# Patient Record
Sex: FEMALE | Race: WHITE | Employment: OTHER | URBAN - METROPOLITAN AREA
[De-identification: names, ages, dates, MRNs, and addresses within clinical notes are randomized per-mention and may not be internally consistent; named-entity substitution may affect disease eponyms.]

---

## 2017-02-07 ENCOUNTER — OFFICE VISIT (OUTPATIENT)
Dept: INTERNAL MEDICINE CLINIC | Age: 82
End: 2017-02-07

## 2017-02-07 VITALS
WEIGHT: 118 LBS | BODY MASS INDEX: 20.91 KG/M2 | HEIGHT: 63 IN | HEART RATE: 84 BPM | DIASTOLIC BLOOD PRESSURE: 60 MMHG | SYSTOLIC BLOOD PRESSURE: 132 MMHG

## 2017-02-07 DIAGNOSIS — E03.4 HYPOTHYROIDISM DUE TO ACQUIRED ATROPHY OF THYROID: Primary | ICD-10-CM

## 2017-02-07 DIAGNOSIS — Z23 NEED FOR 23-POLYVALENT PNEUMOCOCCAL POLYSACCHARIDE VACCINE: ICD-10-CM

## 2017-02-07 DIAGNOSIS — F33.2 ENDOGENOUS DEPRESSION (HCC): ICD-10-CM

## 2017-02-07 PROCEDURE — 90732 PPSV23 VACC 2 YRS+ SUBQ/IM: CPT | Performed by: INTERNAL MEDICINE

## 2017-02-07 PROCEDURE — 99214 OFFICE O/P EST MOD 30 MIN: CPT | Performed by: INTERNAL MEDICINE

## 2017-02-07 PROCEDURE — G0009 ADMIN PNEUMOCOCCAL VACCINE: HCPCS | Performed by: INTERNAL MEDICINE

## 2017-02-07 RX ORDER — CITALOPRAM 10 MG/1
10 TABLET ORAL DAILY
Qty: 30 TABLET | Refills: 11 | Status: SHIPPED | OUTPATIENT
Start: 2017-02-07 | End: 2018-02-17 | Stop reason: SDUPTHER

## 2017-02-07 RX ORDER — LEVOTHYROXINE SODIUM 0.07 MG/1
75 TABLET ORAL DAILY
Qty: 90 TABLET | Refills: 3 | Status: SHIPPED | OUTPATIENT
Start: 2017-02-07 | End: 2018-03-17 | Stop reason: SDUPTHER

## 2017-02-07 ASSESSMENT — PATIENT HEALTH QUESTIONNAIRE - PHQ9
SUM OF ALL RESPONSES TO PHQ9 QUESTIONS 1 & 2: 1
2. FEELING DOWN, DEPRESSED OR HOPELESS: 0
1. LITTLE INTEREST OR PLEASURE IN DOING THINGS: 1
SUM OF ALL RESPONSES TO PHQ QUESTIONS 1-9: 1

## 2017-04-28 ENCOUNTER — OFFICE VISIT (OUTPATIENT)
Dept: INTERNAL MEDICINE CLINIC | Age: 82
End: 2017-04-28

## 2017-04-28 VITALS
HEART RATE: 64 BPM | DIASTOLIC BLOOD PRESSURE: 78 MMHG | BODY MASS INDEX: 21.26 KG/M2 | SYSTOLIC BLOOD PRESSURE: 156 MMHG | HEIGHT: 63 IN | TEMPERATURE: 98.3 F | WEIGHT: 120 LBS

## 2017-04-28 DIAGNOSIS — J20.0 ACUTE BRONCHITIS DUE TO MYCOPLASMA PNEUMONIAE: Primary | ICD-10-CM

## 2017-04-28 PROCEDURE — 99213 OFFICE O/P EST LOW 20 MIN: CPT | Performed by: INTERNAL MEDICINE

## 2017-04-28 RX ORDER — AZITHROMYCIN 250 MG/1
TABLET, FILM COATED ORAL
Qty: 1 PACKET | Refills: 0 | Status: SHIPPED | OUTPATIENT
Start: 2017-04-28 | End: 2017-05-08

## 2017-06-01 ENCOUNTER — OFFICE VISIT (OUTPATIENT)
Dept: INTERNAL MEDICINE CLINIC | Age: 82
End: 2017-06-01

## 2017-06-01 VITALS
WEIGHT: 116 LBS | SYSTOLIC BLOOD PRESSURE: 148 MMHG | DIASTOLIC BLOOD PRESSURE: 60 MMHG | HEART RATE: 72 BPM | HEIGHT: 62 IN | BODY MASS INDEX: 21.35 KG/M2

## 2017-06-01 DIAGNOSIS — E03.4 HYPOTHYROIDISM DUE TO ACQUIRED ATROPHY OF THYROID: Primary | ICD-10-CM

## 2017-06-01 DIAGNOSIS — I48.19 PERSISTENT ATRIAL FIBRILLATION (HCC): ICD-10-CM

## 2017-06-01 PROCEDURE — 99214 OFFICE O/P EST MOD 30 MIN: CPT | Performed by: INTERNAL MEDICINE

## 2017-08-14 RX ORDER — RIVAROXABAN 15 MG/1
TABLET, FILM COATED ORAL
Qty: 30 TABLET | Refills: 3 | Status: SHIPPED | OUTPATIENT
Start: 2017-08-14 | End: 2018-01-01 | Stop reason: SDUPTHER

## 2017-10-03 ENCOUNTER — OFFICE VISIT (OUTPATIENT)
Dept: INTERNAL MEDICINE CLINIC | Age: 82
End: 2017-10-03

## 2017-10-03 VITALS
BODY MASS INDEX: 21.22 KG/M2 | WEIGHT: 116 LBS | SYSTOLIC BLOOD PRESSURE: 140 MMHG | HEART RATE: 76 BPM | DIASTOLIC BLOOD PRESSURE: 80 MMHG

## 2017-10-03 DIAGNOSIS — E03.4 HYPOTHYROIDISM DUE TO ACQUIRED ATROPHY OF THYROID: ICD-10-CM

## 2017-10-03 DIAGNOSIS — Z23 NEED FOR INFLUENZA VACCINATION: ICD-10-CM

## 2017-10-03 DIAGNOSIS — I10 ESSENTIAL HYPERTENSION: Primary | ICD-10-CM

## 2017-10-03 DIAGNOSIS — I48.19 PERSISTENT ATRIAL FIBRILLATION (HCC): Chronic | ICD-10-CM

## 2017-10-03 LAB
A/G RATIO: 1.4 (ref 1.1–2.2)
ALBUMIN SERPL-MCNC: 4.3 G/DL (ref 3.4–5)
ALP BLD-CCNC: 65 U/L (ref 40–129)
ALT SERPL-CCNC: 11 U/L (ref 10–40)
ANION GAP SERPL CALCULATED.3IONS-SCNC: 14 MMOL/L (ref 3–16)
AST SERPL-CCNC: 22 U/L (ref 15–37)
BASOPHILS ABSOLUTE: 0 K/UL (ref 0–0.2)
BASOPHILS RELATIVE PERCENT: 0.6 %
BILIRUB SERPL-MCNC: 0.7 MG/DL (ref 0–1)
BUN BLDV-MCNC: 9 MG/DL (ref 7–20)
CALCIUM SERPL-MCNC: 9.4 MG/DL (ref 8.3–10.6)
CHLORIDE BLD-SCNC: 96 MMOL/L (ref 99–110)
CO2: 28 MMOL/L (ref 21–32)
CREAT SERPL-MCNC: 0.6 MG/DL (ref 0.6–1.2)
EOSINOPHILS ABSOLUTE: 0.1 K/UL (ref 0–0.6)
EOSINOPHILS RELATIVE PERCENT: 1.3 %
GFR AFRICAN AMERICAN: >60
GFR NON-AFRICAN AMERICAN: >60
GLOBULIN: 3.1 G/DL
GLUCOSE BLD-MCNC: 89 MG/DL (ref 70–99)
HCT VFR BLD CALC: 42.7 % (ref 36–48)
HEMOGLOBIN: 14 G/DL (ref 12–16)
LYMPHOCYTES ABSOLUTE: 1.4 K/UL (ref 1–5.1)
LYMPHOCYTES RELATIVE PERCENT: 26.2 %
MCH RBC QN AUTO: 30 PG (ref 26–34)
MCHC RBC AUTO-ENTMCNC: 32.7 G/DL (ref 31–36)
MCV RBC AUTO: 91.7 FL (ref 80–100)
MONOCYTES ABSOLUTE: 0.4 K/UL (ref 0–1.3)
MONOCYTES RELATIVE PERCENT: 7 %
NEUTROPHILS ABSOLUTE: 3.4 K/UL (ref 1.7–7.7)
NEUTROPHILS RELATIVE PERCENT: 64.9 %
PDW BLD-RTO: 13.6 % (ref 12.4–15.4)
PLATELET # BLD: 193 K/UL (ref 135–450)
PMV BLD AUTO: 8.8 FL (ref 5–10.5)
POTASSIUM SERPL-SCNC: 4.3 MMOL/L (ref 3.5–5.1)
RBC # BLD: 4.66 M/UL (ref 4–5.2)
SODIUM BLD-SCNC: 138 MMOL/L (ref 136–145)
TOTAL PROTEIN: 7.4 G/DL (ref 6.4–8.2)
TSH REFLEX FT4: 2.04 UIU/ML (ref 0.27–4.2)
WBC # BLD: 5.2 K/UL (ref 4–11)

## 2017-10-03 PROCEDURE — 90662 IIV NO PRSV INCREASED AG IM: CPT | Performed by: INTERNAL MEDICINE

## 2017-10-03 PROCEDURE — G0008 ADMIN INFLUENZA VIRUS VAC: HCPCS | Performed by: INTERNAL MEDICINE

## 2017-10-03 PROCEDURE — 99214 OFFICE O/P EST MOD 30 MIN: CPT | Performed by: INTERNAL MEDICINE

## 2017-10-03 NOTE — PROGRESS NOTES
Subjective:      Patient ID: Stefanie Layne is a 80 y.o. female. HPI  The patient is her for followup of atrial fibrillation and her chronic hypertension along with thyroid disease. She continues under full dose anticoagulation for atrial fibrillation    She is on thyroid replacement    She continues under treatment with a multidrug regimen for hypertension    There are no other new associated symptoms or modifying factors    Review of her record shows that has been over 6 months and she had laboratory performed    She comes in unaccompanied today.   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

## 2017-10-03 NOTE — MR AVS SNAPSHOT
After Visit Summary             Lady Ordoñez   10/3/2017 1:20 PM   Office Visit    Description:  Female : 1925   Provider:  Gildardo Wallace MD   Department:  [facility]              Your Follow-Up and Future Appointments         Below is a list of your follow-up and future appointments. This may not be a complete list as you may have made appointments directly with providers that we are not aware of or your providers may have made some for you. Please call your providers to confirm appointments. It is important to keep your appointments. Please bring your current insurance card, photo ID, co-pay, and all medication bottles to your appointment. If self-pay, payment is expected at the time of service. Your To-Do List     Follow-Up    Return in about 6 months (around 4/3/2018). Information from Your Visit        Department     Name Address Phone Fax    [facility] [address] [place] [address] [phone]      You Were Seen for:         Comments    Need for influenza vaccination   [[id number]]         Vital Signs     Blood Pressure Pulse Weight Body Mass Index Smoking Status       140/80 76 116 lb (52.6 kg) 21.22 kg/m2 Never Smoker          Medications and Orders      Your Current Medications Are              XARELTO 15 MG TABS tablet TAKE ONE TABLET BY MOUTH DAILY    levothyroxine (LEVOTHROID) 75 MCG tablet Take 1 tablet by mouth daily    citalopram (CELEXA) 10 MG tablet Take 1 tablet by mouth daily    alendronate (FOSAMAX) 70 MG tablet Take 1 tablet by mouth every 7 days    Loratadine (CLARITIN PO) Take by mouth    meclizine (ANTIVERT) 25 MG tablet Take 1 tablet by mouth 3 times daily as needed.  Every 4-6 hours as needed      Allergies           No Known Allergies      We Ordered/Performed the following           CBC Auto Differential     Comprehensive Metabolic Panel

## 2018-01-02 RX ORDER — RIVAROXABAN 15 MG/1
TABLET, FILM COATED ORAL
Qty: 30 TABLET | Refills: 5 | Status: SHIPPED | OUTPATIENT
Start: 2018-01-02 | End: 2018-06-30 | Stop reason: SDUPTHER

## 2018-02-19 RX ORDER — CITALOPRAM 10 MG/1
TABLET ORAL
Qty: 30 TABLET | Refills: 10 | Status: SHIPPED | OUTPATIENT
Start: 2018-02-19 | End: 2019-02-02 | Stop reason: SDUPTHER

## 2018-03-19 RX ORDER — LEVOTHYROXINE SODIUM 0.07 MG/1
TABLET ORAL
Qty: 90 TABLET | Refills: 2 | Status: SHIPPED | OUTPATIENT
Start: 2018-03-19

## 2018-04-03 ENCOUNTER — TELEPHONE (OUTPATIENT)
Dept: INTERNAL MEDICINE CLINIC | Age: 83
End: 2018-04-03

## 2018-04-19 ENCOUNTER — OFFICE VISIT (OUTPATIENT)
Dept: INTERNAL MEDICINE CLINIC | Age: 83
End: 2018-04-19

## 2018-04-19 VITALS
BODY MASS INDEX: 20.85 KG/M2 | SYSTOLIC BLOOD PRESSURE: 160 MMHG | WEIGHT: 114 LBS | HEART RATE: 76 BPM | DIASTOLIC BLOOD PRESSURE: 80 MMHG

## 2018-04-19 DIAGNOSIS — E03.4 HYPOTHYROIDISM DUE TO ACQUIRED ATROPHY OF THYROID: Primary | ICD-10-CM

## 2018-04-19 PROCEDURE — G8420 CALC BMI NORM PARAMETERS: HCPCS | Performed by: INTERNAL MEDICINE

## 2018-04-19 PROCEDURE — 1036F TOBACCO NON-USER: CPT | Performed by: INTERNAL MEDICINE

## 2018-04-19 PROCEDURE — 1123F ACP DISCUSS/DSCN MKR DOCD: CPT | Performed by: INTERNAL MEDICINE

## 2018-04-19 PROCEDURE — G8427 DOCREV CUR MEDS BY ELIG CLIN: HCPCS | Performed by: INTERNAL MEDICINE

## 2018-04-19 PROCEDURE — 3288F FALL RISK ASSESSMENT DOCD: CPT | Performed by: INTERNAL MEDICINE

## 2018-04-19 PROCEDURE — 99213 OFFICE O/P EST LOW 20 MIN: CPT | Performed by: INTERNAL MEDICINE

## 2018-04-19 PROCEDURE — 4040F PNEUMOC VAC/ADMIN/RCVD: CPT | Performed by: INTERNAL MEDICINE

## 2018-04-19 PROCEDURE — 1090F PRES/ABSN URINE INCON ASSESS: CPT | Performed by: INTERNAL MEDICINE

## 2018-04-19 ASSESSMENT — PATIENT HEALTH QUESTIONNAIRE - PHQ9
SUM OF ALL RESPONSES TO PHQ9 QUESTIONS 1 & 2: 1
SUM OF ALL RESPONSES TO PHQ QUESTIONS 1-9: 1
2. FEELING DOWN, DEPRESSED OR HOPELESS: 0
1. LITTLE INTEREST OR PLEASURE IN DOING THINGS: 1

## 2018-07-02 RX ORDER — RIVAROXABAN 15 MG/1
TABLET, FILM COATED ORAL
Qty: 30 TABLET | Refills: 11 | Status: ON HOLD | OUTPATIENT
Start: 2018-07-02 | End: 2019-05-03 | Stop reason: ALTCHOICE

## 2018-10-18 ENCOUNTER — OFFICE VISIT (OUTPATIENT)
Dept: INTERNAL MEDICINE CLINIC | Age: 83
End: 2018-10-18
Payer: MEDICARE

## 2018-10-18 VITALS
HEART RATE: 80 BPM | BODY MASS INDEX: 21.53 KG/M2 | SYSTOLIC BLOOD PRESSURE: 120 MMHG | WEIGHT: 117 LBS | HEIGHT: 62 IN | DIASTOLIC BLOOD PRESSURE: 80 MMHG

## 2018-10-18 DIAGNOSIS — I48.19 PERSISTENT ATRIAL FIBRILLATION (HCC): Chronic | ICD-10-CM

## 2018-10-18 DIAGNOSIS — I10 ESSENTIAL HYPERTENSION: Primary | Chronic | ICD-10-CM

## 2018-10-18 DIAGNOSIS — Z23 NEED FOR INFLUENZA VACCINATION: ICD-10-CM

## 2018-10-18 LAB
A/G RATIO: 1.7 (ref 1.1–2.2)
ALBUMIN SERPL-MCNC: 4.7 G/DL (ref 3.4–5)
ALP BLD-CCNC: 70 U/L (ref 40–129)
ALT SERPL-CCNC: 11 U/L (ref 10–40)
ANION GAP SERPL CALCULATED.3IONS-SCNC: 16 MMOL/L (ref 3–16)
AST SERPL-CCNC: 24 U/L (ref 15–37)
BASOPHILS ABSOLUTE: 0 K/UL (ref 0–0.2)
BASOPHILS RELATIVE PERCENT: 0.7 %
BILIRUB SERPL-MCNC: 0.7 MG/DL (ref 0–1)
BUN BLDV-MCNC: 9 MG/DL (ref 7–20)
CALCIUM SERPL-MCNC: 9.7 MG/DL (ref 8.3–10.6)
CHLORIDE BLD-SCNC: 96 MMOL/L (ref 99–110)
CO2: 25 MMOL/L (ref 21–32)
CREAT SERPL-MCNC: 0.8 MG/DL (ref 0.6–1.2)
EOSINOPHILS ABSOLUTE: 0 K/UL (ref 0–0.6)
EOSINOPHILS RELATIVE PERCENT: 0.8 %
GFR AFRICAN AMERICAN: >60
GFR NON-AFRICAN AMERICAN: >60
GLOBULIN: 2.7 G/DL
GLUCOSE BLD-MCNC: 88 MG/DL (ref 70–99)
HCT VFR BLD CALC: 42.6 % (ref 36–48)
HEMOGLOBIN: 13.9 G/DL (ref 12–16)
LYMPHOCYTES ABSOLUTE: 1.1 K/UL (ref 1–5.1)
LYMPHOCYTES RELATIVE PERCENT: 21.3 %
MCH RBC QN AUTO: 29.5 PG (ref 26–34)
MCHC RBC AUTO-ENTMCNC: 32.7 G/DL (ref 31–36)
MCV RBC AUTO: 90 FL (ref 80–100)
MONOCYTES ABSOLUTE: 0.4 K/UL (ref 0–1.3)
MONOCYTES RELATIVE PERCENT: 7.6 %
NEUTROPHILS ABSOLUTE: 3.7 K/UL (ref 1.7–7.7)
NEUTROPHILS RELATIVE PERCENT: 69.6 %
PDW BLD-RTO: 13.9 % (ref 12.4–15.4)
PLATELET # BLD: 221 K/UL (ref 135–450)
PMV BLD AUTO: 9 FL (ref 5–10.5)
POTASSIUM SERPL-SCNC: 4.4 MMOL/L (ref 3.5–5.1)
RBC # BLD: 4.73 M/UL (ref 4–5.2)
SODIUM BLD-SCNC: 137 MMOL/L (ref 136–145)
TOTAL PROTEIN: 7.4 G/DL (ref 6.4–8.2)
TSH REFLEX FT4: 3.06 UIU/ML (ref 0.27–4.2)
WBC # BLD: 5.3 K/UL (ref 4–11)

## 2018-10-18 PROCEDURE — G8482 FLU IMMUNIZE ORDER/ADMIN: HCPCS | Performed by: INTERNAL MEDICINE

## 2018-10-18 PROCEDURE — 1123F ACP DISCUSS/DSCN MKR DOCD: CPT | Performed by: INTERNAL MEDICINE

## 2018-10-18 PROCEDURE — 1036F TOBACCO NON-USER: CPT | Performed by: INTERNAL MEDICINE

## 2018-10-18 PROCEDURE — 4040F PNEUMOC VAC/ADMIN/RCVD: CPT | Performed by: INTERNAL MEDICINE

## 2018-10-18 PROCEDURE — 1090F PRES/ABSN URINE INCON ASSESS: CPT | Performed by: INTERNAL MEDICINE

## 2018-10-18 PROCEDURE — 90662 IIV NO PRSV INCREASED AG IM: CPT | Performed by: INTERNAL MEDICINE

## 2018-10-18 PROCEDURE — G0008 ADMIN INFLUENZA VIRUS VAC: HCPCS | Performed by: INTERNAL MEDICINE

## 2018-10-18 PROCEDURE — G8427 DOCREV CUR MEDS BY ELIG CLIN: HCPCS | Performed by: INTERNAL MEDICINE

## 2018-10-18 PROCEDURE — 1101F PT FALLS ASSESS-DOCD LE1/YR: CPT | Performed by: INTERNAL MEDICINE

## 2018-10-18 PROCEDURE — 99214 OFFICE O/P EST MOD 30 MIN: CPT | Performed by: INTERNAL MEDICINE

## 2018-10-18 PROCEDURE — G8420 CALC BMI NORM PARAMETERS: HCPCS | Performed by: INTERNAL MEDICINE

## 2019-02-04 RX ORDER — CITALOPRAM 10 MG/1
TABLET ORAL
Qty: 30 TABLET | Refills: 9 | Status: SHIPPED | OUTPATIENT
Start: 2019-02-04

## 2019-04-19 ENCOUNTER — TELEPHONE (OUTPATIENT)
Dept: INTERNAL MEDICINE CLINIC | Age: 84
End: 2019-04-19

## 2019-04-19 NOTE — TELEPHONE ENCOUNTER
Pt's son calling pt had a fall at home a couple mths ago and now is at [facility] and has  an in house doctor that has been seeing her.

## 2019-05-03 ENCOUNTER — HOSPITAL ENCOUNTER (INPATIENT)
Age: 84
LOS: 8 days | Discharge: NURSING, REHAB OR LONG-TERM CARE | DRG: 291 | End: 2019-05-11
Attending: EMERGENCY MEDICINE | Admitting: INTERNAL MEDICINE
Payer: MEDICARE

## 2019-05-03 ENCOUNTER — APPOINTMENT (OUTPATIENT)
Dept: CT IMAGING | Age: 84
DRG: 291 | End: 2019-05-03
Payer: MEDICARE

## 2019-05-03 ENCOUNTER — APPOINTMENT (OUTPATIENT)
Dept: GENERAL RADIOLOGY | Age: 84
DRG: 291 | End: 2019-05-03
Payer: MEDICARE

## 2019-05-03 DIAGNOSIS — R09.02 HYPOXIA: ICD-10-CM

## 2019-05-03 DIAGNOSIS — J18.9 PNEUMONIA DUE TO ORGANISM: ICD-10-CM
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
## 2019-05-03 DIAGNOSIS — I50.9 CONGESTIVE HEART FAILURE, UNSPECIFIED HF CHRONICITY, UNSPECIFIED HEART FAILURE TYPE (HCC): Primary | ICD-10-CM

## 2019-05-03 DIAGNOSIS — R77.8 ELEVATED TROPONIN: ICD-10-CM

## 2019-05-03 PROBLEM — R79.89 ELEVATED TROPONIN: Status: ACTIVE | Noted: 2019-05-03

## 2019-05-03 PROBLEM — E87.1 HYPONATREMIA: Status: ACTIVE | Noted: 2019-05-03

## 2019-05-03 PROBLEM — I50.43 CHF (CONGESTIVE HEART FAILURE), NYHA CLASS I, ACUTE ON CHRONIC, COMBINED (HCC): Status: ACTIVE | Noted: 2019-05-03

## 2019-05-03 PROBLEM — J15.9 COMMUNITY ACQUIRED BACTERIAL PNEUMONIA: Status: ACTIVE | Noted: 2019-05-03

## 2019-05-03 LAB
A/G RATIO: 1.1 (ref 1.1–2.2)
ABO/RH: NORMAL
ALBUMIN SERPL-MCNC: 3.6 G/DL (ref 3.4–5)
ALP BLD-CCNC: 120 U/L (ref 40–129)
ALT SERPL-CCNC: 18 U/L (ref 10–40)
ANION GAP SERPL CALCULATED.3IONS-SCNC: 13 MMOL/L (ref 3–16)
ANTIBODY SCREEN: NORMAL
AST SERPL-CCNC: 32 U/L (ref 15–37)
BASOPHILS ABSOLUTE: 0 K/UL (ref 0–0.2)
BASOPHILS RELATIVE PERCENT: 0.2 %
BILIRUB SERPL-MCNC: 0.9 MG/DL (ref 0–1)
BUN BLDV-MCNC: 9 MG/DL (ref 7–20)
CALCIUM SERPL-MCNC: 8.8 MG/DL (ref 8.3–10.6)
CHLORIDE BLD-SCNC: 88 MMOL/L (ref 99–110)
CO2: 24 MMOL/L (ref 21–32)
CREAT SERPL-MCNC: 0.6 MG/DL (ref 0.6–1.2)
EKG ATRIAL RATE: 72 BPM
EKG DIAGNOSIS: NORMAL
EKG Q-T INTERVAL: 506 MS
EKG QRS DURATION: 118 MS
EKG QTC CALCULATION (BAZETT): 554 MS
EKG R AXIS: -63 DEGREES
EKG T AXIS: 106 DEGREES
EKG VENTRICULAR RATE: 72 BPM
EOSINOPHILS ABSOLUTE: 0 K/UL (ref 0–0.6)
EOSINOPHILS RELATIVE PERCENT: 0.2 %
GFR AFRICAN AMERICAN: >60
GFR NON-AFRICAN AMERICAN: >60
GLOBULIN: 3.3 G/DL
GLUCOSE BLD-MCNC: 139 MG/DL (ref 70–99)
HCT VFR BLD CALC: 31.7 % (ref 36–48)
HEMOGLOBIN: 10.1 G/DL (ref 12–16)
LYMPHOCYTES ABSOLUTE: 0.6 K/UL (ref 1–5.1)
LYMPHOCYTES RELATIVE PERCENT: 7.1 %
MCH RBC QN AUTO: 24.3 PG (ref 26–34)
MCHC RBC AUTO-ENTMCNC: 31.8 G/DL (ref 31–36)
MCV RBC AUTO: 76.6 FL (ref 80–100)
MONOCYTES ABSOLUTE: 0.7 K/UL (ref 0–1.3)
MONOCYTES RELATIVE PERCENT: 9.2 %
NEUTROPHILS ABSOLUTE: 6.6 K/UL (ref 1.7–7.7)
NEUTROPHILS RELATIVE PERCENT: 83.3 %
PDW BLD-RTO: 17.7 % (ref 12.4–15.4)
PLATELET # BLD: 284 K/UL (ref 135–450)
PMV BLD AUTO: 7.6 FL (ref 5–10.5)
POTASSIUM REFLEX MAGNESIUM: 3.6 MMOL/L (ref 3.5–5.1)
PRO-BNP: 2463 PG/ML (ref 0–449)
RBC # BLD: 4.14 M/UL (ref 4–5.2)
SODIUM BLD-SCNC: 125 MMOL/L (ref 136–145)
TOTAL PROTEIN: 6.9 G/DL (ref 6.4–8.2)
TROPONIN: 0.03 NG/ML
TROPONIN: 0.04 NG/ML
WBC # BLD: 7.9 K/UL (ref 4–11)

## 2019-05-03 PROCEDURE — 71046 X-RAY EXAM CHEST 2 VIEWS: CPT

## 2019-05-03 PROCEDURE — 87804 INFLUENZA ASSAY W/OPTIC: CPT

## 2019-05-03 PROCEDURE — 99285 EMERGENCY DEPT VISIT HI MDM: CPT

## 2019-05-03 PROCEDURE — 84484 ASSAY OF TROPONIN QUANT: CPT

## 2019-05-03 PROCEDURE — 86900 BLOOD TYPING SEROLOGIC ABO: CPT

## 2019-05-03 PROCEDURE — 6360000002 HC RX W HCPCS: Performed by: EMERGENCY MEDICINE

## 2019-05-03 PROCEDURE — 96366 THER/PROPH/DIAG IV INF ADDON: CPT

## 2019-05-03 PROCEDURE — 83880 ASSAY OF NATRIURETIC PEPTIDE: CPT

## 2019-05-03 PROCEDURE — 6360000002 HC RX W HCPCS: Performed by: NURSE PRACTITIONER

## 2019-05-03 PROCEDURE — 96365 THER/PROPH/DIAG IV INF INIT: CPT

## 2019-05-03 PROCEDURE — 87449 NOS EACH ORGANISM AG IA: CPT

## 2019-05-03 PROCEDURE — 82728 ASSAY OF FERRITIN: CPT

## 2019-05-03 PROCEDURE — 70450 CT HEAD/BRAIN W/O DYE: CPT

## 2019-05-03 PROCEDURE — 87633 RESP VIRUS 12-25 TARGETS: CPT

## 2019-05-03 PROCEDURE — 87486 CHLMYD PNEUM DNA AMP PROBE: CPT

## 2019-05-03 PROCEDURE — 93005 ELECTROCARDIOGRAM TRACING: CPT | Performed by: EMERGENCY MEDICINE

## 2019-05-03 PROCEDURE — 36415 COLL VENOUS BLD VENIPUNCTURE: CPT

## 2019-05-03 PROCEDURE — 1200000000 HC SEMI PRIVATE

## 2019-05-03 PROCEDURE — 83540 ASSAY OF IRON: CPT

## 2019-05-03 PROCEDURE — 87581 M.PNEUMON DNA AMP PROBE: CPT

## 2019-05-03 PROCEDURE — 93010 ELECTROCARDIOGRAM REPORT: CPT | Performed by: INTERNAL MEDICINE

## 2019-05-03 PROCEDURE — 71260 CT THORAX DX C+: CPT

## 2019-05-03 PROCEDURE — 6360000004 HC RX CONTRAST MEDICATION: Performed by: EMERGENCY MEDICINE

## 2019-05-03 PROCEDURE — 87798 DETECT AGENT NOS DNA AMP: CPT

## 2019-05-03 PROCEDURE — 2580000003 HC RX 258: Performed by: EMERGENCY MEDICINE

## 2019-05-03 PROCEDURE — 86901 BLOOD TYPING SEROLOGIC RH(D): CPT

## 2019-05-03 PROCEDURE — 85025 COMPLETE CBC W/AUTO DIFF WBC: CPT

## 2019-05-03 PROCEDURE — 96375 TX/PRO/DX INJ NEW DRUG ADDON: CPT

## 2019-05-03 PROCEDURE — 2580000003 HC RX 258: Performed by: NURSE PRACTITIONER

## 2019-05-03 PROCEDURE — 83550 IRON BINDING TEST: CPT

## 2019-05-03 PROCEDURE — 86850 RBC ANTIBODY SCREEN: CPT

## 2019-05-03 PROCEDURE — 80053 COMPREHEN METABOLIC PANEL: CPT

## 2019-05-03 RX ORDER — SODIUM CHLORIDE 0.9 % (FLUSH) 0.9 %
10 SYRINGE (ML) INJECTION EVERY 12 HOURS SCHEDULED
Status: DISCONTINUED | OUTPATIENT
Start: 2019-05-03 | End: 2019-05-11 | Stop reason: HOSPADM

## 2019-05-03 RX ORDER — ALBUTEROL SULFATE 2.5 MG/3ML
2.5 SOLUTION RESPIRATORY (INHALATION)
Status: DISCONTINUED | OUTPATIENT
Start: 2019-05-04 | End: 2019-05-11 | Stop reason: HOSPADM

## 2019-05-03 RX ORDER — CETIRIZINE HYDROCHLORIDE 10 MG/1
5 TABLET ORAL NIGHTLY
Status: DISCONTINUED | OUTPATIENT
Start: 2019-05-03 | End: 2019-05-11 | Stop reason: HOSPADM

## 2019-05-03 RX ORDER — SODIUM CHLORIDE 0.9 % (FLUSH) 0.9 %
10 SYRINGE (ML) INJECTION PRN
Status: DISCONTINUED | OUTPATIENT
Start: 2019-05-03 | End: 2019-05-11 | Stop reason: HOSPADM

## 2019-05-03 RX ORDER — ASPIRIN 81 MG/1
324 TABLET, CHEWABLE ORAL ONCE
Status: DISCONTINUED | OUTPATIENT
Start: 2019-05-03 | End: 2019-05-10

## 2019-05-03 RX ORDER — MAGNESIUM SULFATE 1 G/100ML
1 INJECTION INTRAVENOUS PRN
Status: DISCONTINUED | OUTPATIENT
Start: 2019-05-03 | End: 2019-05-11 | Stop reason: HOSPADM

## 2019-05-03 RX ORDER — POTASSIUM CHLORIDE 7.45 MG/ML
10 INJECTION INTRAVENOUS PRN
Status: DISCONTINUED | OUTPATIENT
Start: 2019-05-03 | End: 2019-05-11 | Stop reason: HOSPADM

## 2019-05-03 RX ORDER — ACETAMINOPHEN 325 MG/1
650 TABLET ORAL EVERY 4 HOURS PRN
Status: DISCONTINUED | OUTPATIENT
Start: 2019-05-03 | End: 2019-05-11 | Stop reason: HOSPADM

## 2019-05-03 RX ORDER — FUROSEMIDE 10 MG/ML
40 INJECTION INTRAMUSCULAR; INTRAVENOUS ONCE
Status: COMPLETED | OUTPATIENT
Start: 2019-05-03 | End: 2019-05-03

## 2019-05-03 RX ORDER — CITALOPRAM 20 MG/1
10 TABLET ORAL DAILY
Status: DISCONTINUED | OUTPATIENT
Start: 2019-05-04 | End: 2019-05-11 | Stop reason: HOSPADM

## 2019-05-03 RX ORDER — LEVOTHYROXINE SODIUM 0.07 MG/1
75 TABLET ORAL DAILY
Status: DISCONTINUED | OUTPATIENT
Start: 2019-05-04 | End: 2019-05-11 | Stop reason: HOSPADM

## 2019-05-03 RX ORDER — ONDANSETRON 2 MG/ML
4 INJECTION INTRAMUSCULAR; INTRAVENOUS EVERY 6 HOURS PRN
Status: DISCONTINUED | OUTPATIENT
Start: 2019-05-03 | End: 2019-05-11 | Stop reason: HOSPADM

## 2019-05-03 RX ORDER — SODIUM CHLORIDE 0.9 % (FLUSH) 0.9 %
10 SYRINGE (ML) INJECTION EVERY 12 HOURS SCHEDULED
Status: CANCELLED | OUTPATIENT
Start: 2019-05-03

## 2019-05-03 RX ORDER — LORATADINE 10 MG/1
10 CAPSULE, LIQUID FILLED ORAL DAILY
Status: DISCONTINUED | OUTPATIENT
Start: 2019-05-04 | End: 2019-05-03 | Stop reason: CLARIF

## 2019-05-03 RX ORDER — ACETAMINOPHEN 160 MG
4000 TABLET,DISINTEGRATING ORAL DAILY
COMMUNITY

## 2019-05-03 RX ORDER — SENNA AND DOCUSATE SODIUM 50; 8.6 MG/1; MG/1
1 TABLET, FILM COATED ORAL 2 TIMES DAILY
Status: ON HOLD | COMMUNITY
End: 2019-05-10 | Stop reason: HOSPADM

## 2019-05-03 RX ORDER — LISINOPRIL 5 MG/1
5 TABLET ORAL DAILY
Status: DISCONTINUED | OUTPATIENT
Start: 2019-05-04 | End: 2019-05-04

## 2019-05-03 RX ORDER — FUROSEMIDE 10 MG/ML
20 INJECTION INTRAMUSCULAR; INTRAVENOUS ONCE
Status: COMPLETED | OUTPATIENT
Start: 2019-05-03 | End: 2019-05-03

## 2019-05-03 RX ORDER — SODIUM CHLORIDE 0.9 % (FLUSH) 0.9 %
10 SYRINGE (ML) INJECTION PRN
Status: CANCELLED | OUTPATIENT
Start: 2019-05-03

## 2019-05-03 RX ADMIN — FUROSEMIDE 20 MG: 10 INJECTION, SOLUTION INTRAMUSCULAR; INTRAVENOUS at 22:28

## 2019-05-03 RX ADMIN — IOPAMIDOL 75 ML: 755 INJECTION, SOLUTION INTRAVENOUS at 17:15

## 2019-05-03 RX ADMIN — Medication 10 ML: at 22:28

## 2019-05-03 RX ADMIN — Medication 1 G: at 19:02

## 2019-05-03 RX ADMIN — AZITHROMYCIN MONOHYDRATE 500 MG: 500 INJECTION, POWDER, LYOPHILIZED, FOR SOLUTION INTRAVENOUS at 19:03

## 2019-05-03 RX ADMIN — FUROSEMIDE 40 MG: 10 INJECTION, SOLUTION INTRAMUSCULAR; INTRAVENOUS at 19:02

## 2019-05-03 ASSESSMENT — PAIN SCALES - GENERAL
PAINLEVEL_OUTOF10: 0
PAINLEVEL_OUTOF10: 0

## 2019-05-03 NOTE — ED NOTES
Pt alert and oriented x4. Pt brought in by squad states that she became week today and fell down. Pt denies any pain or head injury. Pt with sob and o2 stats of 81% on RA upon squad arrival to facility. Pt with diminished breathe sounds bilaterally. Pt denies any cp at this time. Pt placed on 5L o2 NC per Dr. Akanksha Velazquez. Denies any further needs. IV established. Blood work collected and sent to lab. Fall precautions in place. Non-skids socks placed on pt. Bed alarm active. Will continue to monitor.       Sebastian Hoffman RN  05/03/19 9450

## 2019-05-03 NOTE — ED PROVIDER NOTES
Emergency Physician Note    Chief Complaint  Shortness of Breath (does not wear O2, states no HX with O2 problems, 81% on RA at SNF, is on a nonrebreather)       History of Present Illness  Delfin Moss is a 80 y.o. female who presents to the ED for shortness of breath. Patient reports she was at home and was walking to the bathroom and became weak and dizzy and decided to return but became too weak and collapsed to the ground. She denies head injury or loss of consciousness. She feels very short of breath. She denies any chest pain or fevers. History and review of systems limited by dementia    I have reviewed the following from the nursing documentation:      Prior to Admission medications    Medication Sig Start Date End Date Taking? Authorizing Provider   citalopram (CELEXA) 10 MG tablet TAKE ONE TABLET BY MOUTH DAILY 2/4/19   Sujata Iverson MD   XARELTO 15 MG TABS tablet TAKE ONE TABLET BY MOUTH DAILY 7/2/18   Sujata Iverson MD   levothyroxine (SYNTHROID) 75 MCG tablet TAKE ONE TABLET BY MOUTH DAILY 3/19/18   Sujata Iverson MD   Loratadine (CLARITIN PO) Take by mouth    Historical Provider, MD   meclizine (ANTIVERT) 25 MG tablet Take 1 tablet by mouth 3 times daily as needed. Every 4-6 hours as needed 4/21/14   Sujata Iverson MD       Allergies as of 05/03/2019    (No Known Allergies)       Past Medical History:   Diagnosis Date    Atrial fibrillation ([address]     Seasonal allergies     Thyroid disease         Surgical History:   Past Surgical History:   Procedure Laterality Date    EYE SURGERY      HIP SURGERY Left 7-26-14    LEFT HIP HEMIARTHROPLASTY                 HYSTERECTOMY          Family History:  History reviewed. No pertinent family history. Social History     Socioeconomic History    Marital status:       Spouse name: Not on file    Number of children: Not on file    Years of education: Not on file    Highest education level: Not on file   Occupational History  Not on file   Social Needs    Financial resource strain: Not on file    Food insecurity:     Worry: Not on file     Inability: Not on file    Transportation needs:     Medical: Not on file     Non-medical: Not on file   Tobacco Use    Smoking status: Never Smoker    Smokeless tobacco: Never Used   Substance and Sexual Activity    Alcohol use: No    Drug use: No    Sexual activity: Not on file   Lifestyle    Physical activity:     Days per week: Not on file     Minutes per session: Not on file    Stress: Not on file   Relationships    Social connections:     Talks on phone: Not on file     Gets together: Not on file     Attends Rastafari service: Not on file     Active member of club or organization: Not on file     Attends meetings of clubs or organizations: Not on file     Relationship status: Not on file    Intimate partner violence:     Fear of current or ex partner: Not on file     Emotionally abused: Not on file     Physically abused: Not on file     Forced sexual activity: Not on file   Other Topics Concern    Not on file   Social History Narrative    Not on file       Nursing notes reviewed. ED Triage Vitals [05/03/19 1413]   Enc Vitals Group      BP (!) 192/78      Pulse 78      Resp 20      Temp 98.2 °F (36.8 °C)      Temp Source Oral      SpO2 99 %      Weight 117 lb (53.1 kg)      Height 5' 2\" (1.575 m)      Head Circumference       Peak Flow       Pain Score       Pain Loc       Pain Edu? Excl. in [address]? GENERAL:  Awake, alert. Well developed, well nourished with no apparent distress. HENT:  Normocephalic, Atraumatic, moist mucous membranes. EYES:  Pupils equal round and reactive to light, Conjunctiva normal, extraocular movements normal.  NECK:  No meningeal signs, Supple. No Tenderness. CHEST:  Regular rate and rhythm, chest wall non-tender. LUNGS:  Occasional rales bilaterally, no respiratory distress.     ABDOMEN:  Soft, non-tender, no rebound, rigidity or guarding, non-distended, normal bowel sounds. No costovertebral angle tenderness to palpation. EXTREMITIES:  Normal range of motion, BLE edema, no tenderness, no deformity, distal pulses present. BACK:  No tenderness. SKIN: Warm, dry and intact. NEUROLOGIC:  Moving all extremities to command. LABS and DIAGNOSTIC RESULTS  EKG  The Ekg interpreted by me shows  normal sinus rhythm with a rate of 72  Axis is   Left axis deviation  QTc is  554ms  LBBB   ST Segments: no Sgarbossa criteria  Delta waves, Brugada Syndrome, and Short MO are not present. Left bundle branch block is new from prior EKG dated 8/24/15, however EKG obtained at [facility] in February was interpreted as having left bundle branch physiology, also this EKG is reviewed by fellow emergency physician Dr. Derek Gleason who agrees no evidence of ST elevation MI at this time. RADIOLOGY  X-RAYS:  I have reviewed radiologic plain film image(s). ALL OTHER NON-PLAIN FILM IMAGES SUCH AS CT, ULTRASOUND AND MRI HAVE BEEN READ BY THE RADIOLOGIST. CT Chest Pulmonary Embolism W Contrast   Final Result   No evidence of an acute pulmonary embolus. Study was mildly motion degraded. Airspace disease in the right upper lobe posterior segment consistent with   pneumonia. There is suspicion of superimposed mild interstitial edema with trace   bilateral pleural fluid and mild to moderate cardiomegaly. Subacute displaced overlapping fracture of the mid body of the sternum. Clinical correlation recommended. CT HEAD WO CONTRAST   Final Result   1. No acute intracranial abnormality. 2. Age-appropriate atrophy with chronic small vessel ischemic white matter   disease. 3. Paranasal sinus disease, as detailed above. XR CHEST STANDARD (2 VW)   Final Result   No acute cardiopulmonary process. COPD.               LABS  Labs Reviewed   CBC WITH AUTO DIFFERENTIAL - Abnormal; Notable for the following components:       Result Value Hemoglobin 10.1 (*)     Hematocrit 31.7 (*)     MCV 76.6 (*)     MCH 24.3 (*)     RDW 17.7 (*)     Lymphocytes # 0.6 (*)     All other components within normal limits    Narrative:     Performed at:  [facility]  [address]   Phone [phone]   COMPREHENSIVE METABOLIC PANEL W/ REFLEX TO MG FOR LOW K - Abnormal; Notable for the following components:    Sodium 125 (*)     Chloride 88 (*)     Glucose 139 (*)     All other components within normal limits    Narrative:     Performed at:  [facility]  [address]   Phone [phone]   TROPONIN - Abnormal; Notable for the following components:    Troponin 0.03 (*)     All other components within normal limits    Narrative:     Performed at:  [facility]  [address]   Phone 21  - Abnormal; Notable for the following components:    Pro-BNP 2,463 (*)     All other components within normal limits    Narrative:     Performed at:  [facility]  [address]   Phone [phone]   TYPE AND SCREEN    Narrative:     Performed at:  [facility]  [address]   Phone [phone] time is 40 minutes which excludes separately billable procedures. The critical care was concerning treatment of hypoxia with supplemental oxygen. This time is exclusive of any time documented by any other providers. Patient has no chest wall tenderness or any report of chest wall trauma. The finding of sternal fracture is a remote/chronic finding for this patient.     I spoke with [facility], NP hospitalist. We thoroughly discussed the history, physical exam, laboratory and imaging studies, as well as, emergency department course. Based upon that discussion, we've decided to admit Tea Dasilva for further observation and evaluation of Mateo Boyce's dyspnea. As I have deemed necessary from their history, physical, and studies, I have considered and evaluated Tea Dasilva for the following diagnoses:  ACUTE CORONARY SYNDROME, CHRONIC OBSTRUCTIVE PULMONARY DISEASE, CONGESTIVE HEART FAILURE, PERICARDIAL TAMPONADE, PNEUMONIA, PNEUMOTHORAX, PULMONARY EMBOLISM, SEPSIS, and THORACIC DISSECTION. FINAL IMPRESSION  1. Congestive heart failure, unspecified HF chronicity, unspecified heart failure type ([address]    2. Pneumonia due to organism    3. Hypoxia    4. Fall, initial encounter    5. Elevated troponin        Vitals:  Blood pressure (!) 174/89, pulse 80, temperature 98.2 °F (36.8 °C), temperature source Oral, resp. rate 18, height 5' 2\" (1.575 m), weight 117 lb (53.1 kg), SpO2 98 %. Patient was given scripts for the following medications. I counseled patient how to take these medications. New Prescriptions    No medications on file       Disposition  Pt is in stable condition upon Admit to telemetry. This chart was generated using the [employer] dictation system. I created this record but it may contain dictation errors.           Ham Rader MD  05/03/19 0657

## 2019-05-03 NOTE — ED NOTES
Bed: 08  Expected date:   Expected time:   Means of arrival: [facility]  Comments:  CHRISTY Bender  05/03/19 0217

## 2019-05-03 NOTE — ED NOTES
EKG results shown to Dr. Rosibel Conde. Dr. Rosibel Conde compared current EKG results to previous EKG. Dr. Rosibel Conde determined pt is not experiencing acute MI. Previous poc to be continued. Pt coughing up phlegm. Pt still denies any cp at this time. Pt repositioned for better oxygenation. Pt denies any further needs. Fall precautions continued. Will continue to monitor.       Kristi Quevedo RN  05/03/19 9191

## 2019-05-04 LAB
ANION GAP SERPL CALCULATED.3IONS-SCNC: 14 MMOL/L (ref 3–16)
ANION GAP SERPL CALCULATED.3IONS-SCNC: 14 MMOL/L (ref 3–16)
BASOPHILS ABSOLUTE: 0 K/UL (ref 0–0.2)
BASOPHILS RELATIVE PERCENT: 0.3 %
BUN BLDV-MCNC: 15 MG/DL (ref 7–20)
BUN BLDV-MCNC: 9 MG/DL (ref 7–20)
CALCIUM SERPL-MCNC: 8.4 MG/DL (ref 8.3–10.6)
CALCIUM SERPL-MCNC: 8.6 MG/DL (ref 8.3–10.6)
CHLORIDE BLD-SCNC: 83 MMOL/L (ref 99–110)
CHLORIDE BLD-SCNC: 87 MMOL/L (ref 99–110)
CHOLESTEROL, TOTAL: 153 MG/DL (ref 0–199)
CO2: 28 MMOL/L (ref 21–32)
CO2: 28 MMOL/L (ref 21–32)
CREAT SERPL-MCNC: 0.7 MG/DL (ref 0.6–1.2)
CREAT SERPL-MCNC: 1 MG/DL (ref 0.6–1.2)
EOSINOPHILS ABSOLUTE: 0 K/UL (ref 0–0.6)
EOSINOPHILS RELATIVE PERCENT: 0.1 %
FERRITIN: 75.7 NG/ML (ref 15–150)
GFR AFRICAN AMERICAN: >60
GFR AFRICAN AMERICAN: >60
GFR NON-AFRICAN AMERICAN: 52
GFR NON-AFRICAN AMERICAN: >60
GLUCOSE BLD-MCNC: 147 MG/DL (ref 70–99)
GLUCOSE BLD-MCNC: 155 MG/DL (ref 70–99)
HCT VFR BLD CALC: 29.8 % (ref 36–48)
HDLC SERPL-MCNC: 71 MG/DL (ref 40–60)
HEMOGLOBIN: 9.5 G/DL (ref 12–16)
IRON SATURATION: 9 % (ref 15–50)
IRON: 28 UG/DL (ref 37–145)
L. PNEUMOPHILA SEROGP 1 UR AG: NORMAL
LDL CHOLESTEROL CALCULATED: 70 MG/DL
LYMPHOCYTES ABSOLUTE: 0.6 K/UL (ref 1–5.1)
LYMPHOCYTES RELATIVE PERCENT: 7 %
MAGNESIUM: 2 MG/DL (ref 1.8–2.4)
MCH RBC QN AUTO: 24.3 PG (ref 26–34)
MCHC RBC AUTO-ENTMCNC: 32 G/DL (ref 31–36)
MCV RBC AUTO: 75.7 FL (ref 80–100)
MONOCYTES ABSOLUTE: 0.7 K/UL (ref 0–1.3)
MONOCYTES RELATIVE PERCENT: 8.5 %
NEUTROPHILS ABSOLUTE: 7 K/UL (ref 1.7–7.7)
NEUTROPHILS RELATIVE PERCENT: 84.1 %
PDW BLD-RTO: 17.6 % (ref 12.4–15.4)
PLATELET # BLD: 257 K/UL (ref 135–450)
PMV BLD AUTO: 7.8 FL (ref 5–10.5)
POTASSIUM SERPL-SCNC: 3.3 MMOL/L (ref 3.5–5.1)
POTASSIUM SERPL-SCNC: 3.7 MMOL/L (ref 3.5–5.1)
RAPID INFLUENZA  B AGN: NEGATIVE
RAPID INFLUENZA A AGN: NEGATIVE
RBC # BLD: 3.94 M/UL (ref 4–5.2)
REPORT: NORMAL
RESPIRATORY PANEL PCR: NORMAL
SODIUM BLD-SCNC: 125 MMOL/L (ref 136–145)
SODIUM BLD-SCNC: 129 MMOL/L (ref 136–145)
STREP PNEUMONIAE ANTIGEN, URINE: NORMAL
TOTAL IRON BINDING CAPACITY: 317 UG/DL (ref 260–445)
TRIGL SERPL-MCNC: 60 MG/DL (ref 0–150)
TROPONIN: 0.05 NG/ML
TSH REFLEX: 1.34 UIU/ML (ref 0.27–4.2)
URIC ACID, SERUM: 4.6 MG/DL (ref 2.6–6)
VLDLC SERPL CALC-MCNC: 12 MG/DL
WBC # BLD: 8.4 K/UL (ref 4–11)

## 2019-05-04 PROCEDURE — 99223 1ST HOSP IP/OBS HIGH 75: CPT | Performed by: INTERNAL MEDICINE

## 2019-05-04 PROCEDURE — 85025 COMPLETE CBC W/AUTO DIFF WBC: CPT

## 2019-05-04 PROCEDURE — 6360000002 HC RX W HCPCS: Performed by: NURSE PRACTITIONER

## 2019-05-04 PROCEDURE — 87040 BLOOD CULTURE FOR BACTERIA: CPT

## 2019-05-04 PROCEDURE — 2580000003 HC RX 258: Performed by: NURSE PRACTITIONER

## 2019-05-04 PROCEDURE — 84443 ASSAY THYROID STIM HORMONE: CPT

## 2019-05-04 PROCEDURE — 99221 1ST HOSP IP/OBS SF/LOW 40: CPT | Performed by: INTERNAL MEDICINE

## 2019-05-04 PROCEDURE — 83735 ASSAY OF MAGNESIUM: CPT

## 2019-05-04 PROCEDURE — 2700000000 HC OXYGEN THERAPY PER DAY

## 2019-05-04 PROCEDURE — 80061 LIPID PANEL: CPT

## 2019-05-04 PROCEDURE — 2580000003 HC RX 258

## 2019-05-04 PROCEDURE — 1200000000 HC SEMI PRIVATE

## 2019-05-04 PROCEDURE — 84550 ASSAY OF BLOOD/URIC ACID: CPT

## 2019-05-04 PROCEDURE — 6360000002 HC RX W HCPCS: Performed by: INTERNAL MEDICINE

## 2019-05-04 PROCEDURE — 92610 EVALUATE SWALLOWING FUNCTION: CPT

## 2019-05-04 PROCEDURE — 6370000000 HC RX 637 (ALT 250 FOR IP): Performed by: NURSE PRACTITIONER

## 2019-05-04 PROCEDURE — 80048 BASIC METABOLIC PNL TOTAL CA: CPT

## 2019-05-04 PROCEDURE — 6370000000 HC RX 637 (ALT 250 FOR IP): Performed by: INTERNAL MEDICINE

## 2019-05-04 PROCEDURE — 94640 AIRWAY INHALATION TREATMENT: CPT

## 2019-05-04 PROCEDURE — 94760 N-INVAS EAR/PLS OXIMETRY 1: CPT

## 2019-05-04 PROCEDURE — 36415 COLL VENOUS BLD VENIPUNCTURE: CPT

## 2019-05-04 PROCEDURE — 84484 ASSAY OF TROPONIN QUANT: CPT

## 2019-05-04 PROCEDURE — 92526 ORAL FUNCTION THERAPY: CPT

## 2019-05-04 RX ORDER — FERROUS SULFATE 325(65) MG
325 TABLET ORAL 2 TIMES DAILY WITH MEALS
Status: DISCONTINUED | OUTPATIENT
Start: 2019-05-04 | End: 2019-05-07

## 2019-05-04 RX ORDER — FUROSEMIDE 10 MG/ML
40 INJECTION INTRAMUSCULAR; INTRAVENOUS ONCE
Status: COMPLETED | OUTPATIENT
Start: 2019-05-04 | End: 2019-05-04

## 2019-05-04 RX ORDER — SODIUM CHLORIDE 9 MG/ML
INJECTION, SOLUTION INTRAVENOUS
Status: COMPLETED
Start: 2019-05-04 | End: 2019-05-04

## 2019-05-04 RX ORDER — FUROSEMIDE 20 MG/1
20 TABLET ORAL DAILY
Status: DISCONTINUED | OUTPATIENT
Start: 2019-05-05 | End: 2019-05-04

## 2019-05-04 RX ORDER — POTASSIUM CHLORIDE 7.45 MG/ML
10 INJECTION INTRAVENOUS
Status: DISPENSED | OUTPATIENT
Start: 2019-05-04 | End: 2019-05-04

## 2019-05-04 RX ORDER — SODIUM CHLORIDE 9 MG/ML
INJECTION, SOLUTION INTRAVENOUS CONTINUOUS
Status: DISPENSED | OUTPATIENT
Start: 2019-05-04 | End: 2019-05-05

## 2019-05-04 RX ADMIN — CITALOPRAM HYDROBROMIDE 10 MG: 20 TABLET ORAL at 08:35

## 2019-05-04 RX ADMIN — FUROSEMIDE 40 MG: 10 INJECTION, SOLUTION INTRAMUSCULAR; INTRAVENOUS at 21:10

## 2019-05-04 RX ADMIN — ALBUTEROL SULFATE 2.5 MG: 2.5 SOLUTION RESPIRATORY (INHALATION) at 08:05

## 2019-05-04 RX ADMIN — POTASSIUM CHLORIDE 10 MEQ: 10 INJECTION, SOLUTION INTRAVENOUS at 22:45

## 2019-05-04 RX ADMIN — SODIUM CHLORIDE 250 ML: 9 INJECTION, SOLUTION INTRAVENOUS at 19:03

## 2019-05-04 RX ADMIN — ALBUTEROL SULFATE 2.5 MG: 2.5 SOLUTION RESPIRATORY (INHALATION) at 16:14

## 2019-05-04 RX ADMIN — Medication 10 ML: at 08:59

## 2019-05-04 RX ADMIN — RIVAROXABAN 15 MG: 15 TABLET, FILM COATED ORAL at 17:00

## 2019-05-04 RX ADMIN — ENOXAPARIN SODIUM 40 MG: 40 INJECTION SUBCUTANEOUS at 00:13

## 2019-05-04 RX ADMIN — ALBUTEROL SULFATE 2.5 MG: 2.5 SOLUTION RESPIRATORY (INHALATION) at 19:44

## 2019-05-04 RX ADMIN — LEVOTHYROXINE SODIUM 75 MCG: 75 TABLET ORAL at 05:20

## 2019-05-04 RX ADMIN — Medication 1 G: at 08:34

## 2019-05-04 RX ADMIN — Medication 10 ML: at 21:11

## 2019-05-04 RX ADMIN — POTASSIUM CHLORIDE 10 MEQ: 10 INJECTION, SOLUTION INTRAVENOUS at 20:39

## 2019-05-04 RX ADMIN — FERROUS SULFATE TAB 325 MG (65 MG ELEMENTAL FE) 325 MG: 325 (65 FE) TAB at 17:00

## 2019-05-04 RX ADMIN — FERROUS SULFATE TAB 325 MG (65 MG ELEMENTAL FE) 325 MG: 325 (65 FE) TAB at 11:57

## 2019-05-04 RX ADMIN — ALBUTEROL SULFATE 2.5 MG: 2.5 SOLUTION RESPIRATORY (INHALATION) at 12:29

## 2019-05-04 RX ADMIN — POTASSIUM CHLORIDE 10 MEQ: 10 INJECTION, SOLUTION INTRAVENOUS at 19:03

## 2019-05-04 RX ADMIN — CETIRIZINE HYDROCHLORIDE 5 MG: 10 TABLET, FILM COATED ORAL at 19:58

## 2019-05-04 RX ADMIN — LISINOPRIL 5 MG: 5 TABLET ORAL at 08:35

## 2019-05-04 RX ADMIN — AZITHROMYCIN MONOHYDRATE 500 MG: 500 INJECTION, POWDER, LYOPHILIZED, FOR SOLUTION INTRAVENOUS at 08:34

## 2019-05-04 ASSESSMENT — PAIN SCALES - GENERAL
PAINLEVEL_OUTOF10: 0

## 2019-05-04 NOTE — H&P
Hospital Medicine History & Physical      PCP: Lucille Valadez MD    Date of Admission: 5/3/2019    Date of Service: Pt seen/examined on May 3, 2019 and Admitted to Inpatient with expected LOS greater than two midnights due to medical therapy. Chief Complaint:  Shortness of breath and hypoxemia      History Of Present Illness:      80 y.o. female with PMHx of Atrial fibrillation, CHF, hyperlipidemia, hypertension and thyroid disease  presented to [facility] emergency room in which the patient states that today she was on her way to the bathroom. She began to feel weak and dizzy and collapsed to the floor. She denies losing consciousness. She denies hitting her head. She states \" I was just extremely short of breath\". Patient arrived by EMS and it was stated she was 81% on room air at the skilled nursing facility. Patient was placed on a nonrebreather satting 93%. Patient does not use oxygen at home. Past Medical History:          Diagnosis Date    Atrial fibrillation ([address]     Cancer (HCC)     breast cancer     CHF (congestive heart failure) (HCC)     Hyperlipidemia     Hypertension     Seasonal allergies     Thyroid disease        Past Surgical History:          Procedure Laterality Date    EYE SURGERY      HIP SURGERY Left 7-26-14    LEFT HIP HEMIARTHROPLASTY                 HYSTERECTOMY         Medications Prior to Admission:      Prior to Admission medications    Medication Sig Start Date End Date Taking? Authorizing Provider   citalopram (CELEXA) 10 MG tablet TAKE ONE TABLET BY MOUTH DAILY 2/4/19   Lucille Valadez MD   XARELTO 15 MG TABS tablet TAKE ONE TABLET BY MOUTH DAILY 7/2/18   Lucille Valadez MD   levothyroxine (SYNTHROID) 75 MCG tablet TAKE ONE TABLET BY MOUTH DAILY 3/19/18   Lucille Valadez MD   Loratadine (CLARITIN PO) Take by mouth    Historical Provider, MD   meclizine (ANTIVERT) 25 MG tablet Take 1 tablet by mouth 3 times daily as needed.  Every 4-6 hours as needed 4/21/14   Feliciano Giordano MD       Allergies:  Patient has no known allergies. Social History:      The patient currently lives Assisted living    TOBACCO:   reports that she has never smoked. She has never used smokeless tobacco.  ETOH:   reports that she does not drink alcohol. Family History:      Reviewed in detail positive as follows:    History reviewed. No pertinent family history. REVIEW OF SYSTEMS:   Pertinent positives as noted in the HPI. All other systems reviewed and negative. PHYSICAL EXAM PERFORMED:    BP (!) 153/81   Pulse 78   Temp 96.5 °F (35.8 °C) (Temporal)   Resp 20   Ht 5' 2\" (1.575 m)   Wt 117 lb (53.1 kg)   SpO2 98%   BMI 21.40 kg/m²     General appearance:  No apparent distress, appears stated age and cooperative. HEENT:  Normal cephalic, atraumatic without obvious deformity. Pupils equal, round, and reactive to light. Extra ocular muscles intact. Conjunctivae/corneas clear. Neck: Supple, with full range of motion. No jugular venous distention. Trachea midline. Respiratory:  Normal respiratory effort. By basilar rales fine  Cardiovascular:  Regular rate and rhythm without murmurs, rubs or gallops. Abdomen: Soft, non-tender, non-distended, without rebound or guarding. Normal bowel sounds. Musculoskeletal:  No clubbing, cyanosis or edema bilaterally. Full range of motion without deformity. Skin: Skin color, texture, turgor normal.  No rashes or lesions. Neurologic:  Neurovascularly intact without any focal sensory/motor deficits.  Cranial nerves: II-XII intact, grossly non-focal.  Psychiatric:  Alert and oriented, thought content appropriate, normal insight  Capillary Refill: Brisk,< 3 seconds   Peripheral Pulses: +2 palpable, equal bilaterally       Labs:     Recent Labs     05/03/19  1417   WBC 7.9   HGB 10.1*   HCT 31.7*        Recent Labs     05/03/19  1417   *   K 3.6   CL 88*   CO2 24   BUN 9   CREATININE 0.6   CALCIUM 8.8     Recent Labs     05/03/19  1417   AST 32   ALT 18   BILITOT 0.9   ALKPHOS 120     No results for input(s): INR in the last 72 hours. Recent Labs     05/03/19  1417   TROPONINI 0.03*       Urinalysis:      Lab Results   Component Value Date    NITRU Negative 07/25/2014    WBCUA 0-2 07/25/2014    BACTERIA Rare 07/25/2014    RBCUA None seen 07/25/2014    BLOODU MODERATE 07/25/2014    SPECGRAV 1.020 07/25/2014    GLUCOSEU Negative 07/25/2014       Radiology:     CXR: I have reviewed the CXR with the following interpretation:   EKG:  I have reviewed the EKG with the following interpretation:     CT Chest Pulmonary Embolism W Contrast   Final Result   No evidence of an acute pulmonary embolus. Study was mildly motion degraded. Airspace disease in the right upper lobe posterior segment consistent with   pneumonia. There is suspicion of superimposed mild interstitial edema with trace   bilateral pleural fluid and mild to moderate cardiomegaly. Subacute displaced overlapping fracture of the mid body of the sternum. Clinical correlation recommended. CT HEAD WO CONTRAST   Final Result   1. No acute intracranial abnormality. 2. Age-appropriate atrophy with chronic small vessel ischemic white matter   disease. 3. Paranasal sinus disease, as detailed above. XR CHEST STANDARD (2 VW)   Final Result   No acute cardiopulmonary process. COPD.              ASSESSMENT:    Active Hospital Problems    Diagnosis Date Noted    CHF (congestive heart failure), NYHA class I, acute on chronic, combined ([facility] [I50.43] 05/03/2019    Hyponatremia [E87.1] 05/03/2019    Elevated troponin [R74.8] 05/03/2019    Community acquired bacterial pneumonia [J15.9] 05/03/2019    Hypoxemia [R09.02] 05/03/2019    HTN (hypertension) [I10] 06/14/2013    A-fib ([facility] [I48.91] 05/10/2013         PLAN:   CHF  -Echo  She had an echo on 6/21/13 which showed Normal left ventricle size, wall thickness and systolic function with an

## 2019-05-04 NOTE — PROGRESS NOTES
Pt admitted to room 3312. Admission complete. Head to toe assessment completed. Pt lives at [facility] rec verified and updated with them. Pt oriented to self only. Bed alarm activated. Non-skid socks on. Pt resting in bed with no signs of distress. VSS. Erickson cath in place. Evening medications given, see MAR for details. Will continue to monitor. Call light within reach.

## 2019-05-04 NOTE — CONSULTS
[address]  Inpatient Consultation / H+P    REASON FOR CONSULT/CHIEF COMPLAINT/HPI     TYPE Interventional Cardiology / Structural Heart Disease   RFC/CC sob   HPI Charu Foy is a 80 y. o.presents to ER because \"they brought me here\". Patient very poor historian and unable to provide valuable history. Denies cp or sob at this time. EKG without acute changes, trivial elevation in troponin. Patient coughing up yellow-green sputum and wiping on her blanket. HISTORY/ALLERGIES/ROS       MedHx:  has a past medical history of Atrial fibrillation ([facility] Cancer ([facility] CHF (congestive heart failure) ([facility] Hyperlipidemia, Hypertension, Seasonal allergies, and Thyroid disease. SurgHx:  has a past surgical history that includes eye surgery; Hysterectomy; and hip surgery (Left, 7-26-14). SocHx:  reports that she has never smoked. She has never used smokeless tobacco. She reports that she does not drink alcohol or use drugs. FamHx: No evidence for sudden cardiac death or premature CAD. Allergies: Patient has no known allergies. ROS:   [x]Full ROS obtained and negative except as mentioned in HPI    MEDICATIONS      Prior to Admission medications    Medication Sig Start Date End Date Taking?  Authorizing Provider   Cholecalciferol (VITAMIN D3) 2000 units CAPS Take 2,000 capsules by mouth daily Takes 2 cap to equal 4,000   Yes Historical Provider, MD   sennosides-docusate sodium (SENOKOT-S) 8.6-50 MG tablet Take 1 tablet by mouth 2 times daily   Yes Historical Provider, MD   citalopram (CELEXA) 10 MG tablet TAKE ONE TABLET BY MOUTH DAILY 2/4/19  Yes Elena Davies MD   levothyroxine (SYNTHROID) 75 MCG tablet TAKE ONE TABLET BY MOUTH DAILY 3/19/18  Yes Elena Davies MD   Loratadine (CLARITIN PO) Take by mouth   Yes Historical Provider, MD       PHYSICAL EXAM        Vitals:    05/04/19 0805   BP:    Pulse:    Resp:    Temp:    SpO2: 97%    Weight: 113 lb 11.2 oz (51.6 kg)     Gen Alert, coop, no distress Heart  RRR, 1/6   Head NC, AT, no abnorm Abd  Soft, NT, +BS, no mass, no OM   Eyes PERRLA, conj/corn clear Ext  Ext nl, AT, no C/C/E   Nose Nares nl, no drain, NT Pulse 2+ and symmetric   Throat Lips, mucosa, tongue nl Skin Color/text/turg nl, no rash/lesions   Neck S/S, TM, NT, no bruit/JVD Psych Nl mood and affect   Lung decr bilat Lymph   No cervical or axillary LA   Ch wall NT, no deform Neuro  Nl gross M/S exam     LABS     Hgb 9.5, Cr 0.7, trop 0.05    ASSESSMENT AND PLAN     ~CAD/Acute on chronic CHF   Date EF Results   Sx   sob   [facility]   Difficult to assess with pneumonia   Hx   No known CAD   C   None in EMR   MPI   None in EMR   TTE 6/13 55%    EKG   NSR, ILBBB   Trop   0.03-0.04-0.05   Plan   Echo pending  Gentle diuresis would be reasonable  Patient declines stress testing, heart caths, stents  Wants conservative management only   ~Afib   Date Detail   Type  paroxysmal   R/R  regular   R/RM  Reviewed   CVS  >1   AC/AP  xarelto   Plan  Continue current medical regimen at doses noted above   ~Pneumonia  Per med  ~Dementia  Oriented X1, does not know year, president, current location  Poor candidate for invasive procedures with age and underlying dementia  Patient DNR/CC    Call with questions.

## 2019-05-04 NOTE — PROGRESS NOTES
[facility] PROGRESS NOTE    5/4/2019 8:37 AM        Name: Vic Hanson . Admitted: 5/3/2019  Primary Care Provider: Krupa Farrar MD (Tel: [phone])      Subjective: Merritt Barney She is lying in bed  She is coughing with green -yellow sputum  She denies any CP  She report SOB    Reviewed interval ancillary notes    Current Medications    aspirin chewable tablet 324 mg Once   levothyroxine (SYNTHROID) tablet 75 mcg Daily   citalopram (CELEXA) tablet 10 mg Daily   sodium chloride flush 0.9 % injection 10 mL 2 times per day   sodium chloride flush 0.9 % injection 10 mL PRN   magnesium hydroxide (MILK OF MAGNESIA) 400 MG/5ML suspension 30 mL Daily PRN   ondansetron (ZOFRAN) injection 4 mg Q6H PRN   potassium chloride 10 mEq/100 mL IVPB (Peripheral Line) PRN   magnesium sulfate 1 g in dextrose 5% 100 mL IVPB PRN   acetaminophen (TYLENOL) tablet 650 mg Q4H PRN   lisinopril (PRINIVIL;ZESTRIL) tablet 5 mg Daily   cetirizine (ZYRTEC) tablet 5 mg Nightly   albuterol (PROVENTIL) nebulizer solution 2.5 mg Q4H WA   azithromycin (ZITHROMAX) 500 mg in D5W 250ml Vial Mate Q24H   And    cefTRIAXone (ROCEPHIN) 1 g in sterile water 10 mL IV syringe Q24H   enoxaparin (LOVENOX) injection 40 mg BID       Objective:  /75   Pulse 71   Temp 97.3 °F (36.3 °C) (Temporal)   Resp 20   Ht 5' 2\" (1.575 m)   Wt 113 lb 11.2 oz (51.6 kg)   SpO2 97%   BMI 20.80 kg/m²     Intake/Output Summary (Last 24 hours) at 5/4/2019 0837  Last data filed at 5/4/2019 0540  Gross per 24 hour   Intake --   Output 1795 ml   Net -1795 ml    Wt Readings from Last 3 Encounters:   05/04/19 113 lb 11.2 oz (51.6 kg)   10/18/18 117 lb (53.1 kg)   04/19/18 114 lb (51.7 kg)       General appearance:  Appears comfortable  Eyes: Sclera clear. Pupils equal.  ENT: Moist oral mucosa. Trachea midline, no adenopathy. Cardiovascular: Regular rhythm, normal S1, S2.  No murmur. No edema in lower extremities  Respiratory: Not using accessory muscles. Good inspiratory effort. Clear to auscultation bilaterally, no wheeze or crackles. GI: Abdomen soft, no tenderness, not distended, normal bowel sounds  Musculoskeletal: No cyanosis in digits, neck supple  Neurology: CN 2-12 grossly intact. No speech or motor deficits  Psych: Normal affect. Alert and oriented in time, place and person  Skin: Warm, dry, normal turgor    Labs and Tests:  CBC:   Recent Labs     05/03/19  1417 05/04/19  0514   WBC 7.9 8.4   HGB 10.1* 9.5*    257     BMP:  Recent Labs     05/03/19  1417 05/04/19  0514   * 129*   K 3.6 3.7   CL 88* 87*   CO2 24 28   BUN 9 9   CREATININE 0.6 0.7   GLUCOSE 139* 155*     Hepatic: Recent Labs     05/03/19  1417   AST 32   ALT 18   BILITOT 0.9   ALKPHOS 120       Problem List  Active Problems:    A-fib (HCC)    HTN (hypertension)    CHF (congestive heart failure), NYHA class I, acute on chronic, combined (HCC)    Hyponatremia    Elevated troponin    Community acquired bacterial pneumonia    Hypoxemia  Resolved Problems:    * No resolved hospital problems. *       Assessment & Plan:    acute CHF exacerbation, unclear if it is daistolic or systolic,   -Echo ordered,   -BNP 2364  -Lasix 20 mg daily gentle diuresis  -Admit to telemetry  -Consult cardiology  - strict Is and Os  -Patient nothing by mouth after midnight in the event that there is testing in the morning    Elevated troponin  -  Pt denies chest pain, and there are not ischemic changes on the initial EKG. Cardiology consulted appreciate recs     Pneumonia, likely bacterial organism is not specified  - evident on CT  - will order blood cultures  - check aspiration  - Rocephin and Zithromax  -Patient was hypoxic at 81% when EMS arrived.  Currently on 6 L high flow setting 97% we'll wean accordingly  - Pulmonary/Infectious disease services were consulted    Acute hypoxic RESP Failure, 2/2 above  -Secondary to pneumonia and CHF.  Responded nicely to oxygen per nasal cannula  -We'll wean to keep sats greater than 93%     Hyponatremia  -repeat labs in am, if still hyponatremia will need to restrict free water, pt currently NPO  - TSH ordered     Atrial fibrillation  -Currently taking Xarelto  -Rate controlled  -Admit to telemetry continued to monitor, high risk of RVR secondary to hypoxia and pneumonia and CHF     Hypertension  - Maintaining current medications  -Monitor closely     Diet: Diet NPO, After Midnight  Code:DNR-CC  DVT PPX xarelto      Christelle Chapman MD   5/4/2019 8:37 AM

## 2019-05-04 NOTE — PROGRESS NOTES
Nutrition Assessment (Low Risk)    Type and Reason for Visit: Consult, Patient Education    Nutrition Assessment:  Consult for CHF diet education. Pt fast asleep and RD unable to wake pt. CHF handouts left at bedside.      Nutrition Intervention:  Nutrition Education/Counseling/Coordination of Care:  Education Initiated(CHF 0 mins)      Electronically signed by Tae Chambers RD, CNSC, LD on 5/4/19 at 8:57 AM    Contact Number: [phone]

## 2019-05-04 NOTE — CONSULTS
Pulmonary & Critical Care Medicine Consultation    ASSESSMENT:  · Acute Hypoxic Respiratory Failure due to pneumonia and pulmonary edema - improved  · Right Upper Lobe Pneumonia  · Acute Pulmonary Edema    PLAN:  · Oxygen as needed  · Antibiotics per Infectious Disease  · Agree with Furosemide for pulmonary edema      REASON FOR CONSULTATION/CC: acute respiratory failure and pneumonia     CONSULTING PHYSICIAN: SALVADOR Bruno    CHIEF COMPLAINT:  shortness of breath     HISTORY OF PRESENT ILLNESS:  The patient has dyspnea and cough. She has some dementia so her history is not totally reliable. I did speak with her son at bedside. She was on high flow nasal cannula. She now has been weaned to nasal cannula at 3 lpm.     She is on Xarelto for atrial fibrillation. PAST MEDICAL HISTORY:  Past Medical History:   Diagnosis Date    Atrial fibrillation ([address]     Cancer (HCC)     breast cancer     CHF (congestive heart failure) (HCC)     Hyperlipidemia     Hypertension     Seasonal allergies     Thyroid disease      PAST SURGICAL HISTORY:  Past Surgical History:   Procedure Laterality Date    EYE SURGERY      HIP SURGERY Left 7-26-14    LEFT HIP HEMIARTHROPLASTY                 HYSTERECTOMY         FAMILY HISTORY:  family history is not on file. SOCIAL HISTORY:   reports that she has never smoked.  She has never used smokeless tobacco.    MEDICATIONS:  Scheduled Meds:   [START ON 5/5/2019] furosemide  20 mg Oral Daily    ferrous sulfate  325 mg Oral BID WC    rivaroxaban  15 mg Oral Daily    aspirin  324 mg Oral Once    levothyroxine  75 mcg Oral Daily    citalopram  10 mg Oral Daily    sodium chloride flush  10 mL Intravenous 2 times per day    lisinopril  5 mg Oral Daily    cetirizine  5 mg Oral Nightly    albuterol  2.5 mg Nebulization Q4H WA    azithromycin  500 mg Intravenous Q24H    And    cefTRIAXone (ROCEPHIN) IV  1 g Intravenous Q24H     Continuous Infusions:    PRN Meds:  sodium chloride flush, magnesium hydroxide, ondansetron, potassium chloride, magnesium sulfate, acetaminophen    ALLERGIES:  Patient has No Known Allergies. PHYSICAL EXAM:  VITAL SIGNS: Blood pressure 138/61, pulse 67, temperature 98 °F (36.7 °C), temperature source Temporal, resp. rate 20, height 5' 2\" (1.575 m), weight 113 lb 11.2 oz (51.6 kg), SpO2 91 %. Gen:   No distress. Breathing comfortably at rest.   ENT:   Nasal cannula in place. Resp:   No accessory muscle use. Bibasilar rales. No wheezing. CV:   PMI is normal. No murmur or gallop. LABS:  CBC:   Recent Labs     05/03/19  1417 05/04/19  0514   WBC 7.9 8.4   HGB 10.1* 9.5*   HCT 31.7* 29.8*   MCV 76.6* 75.7*    257     CHEMISTRY:   Recent Labs     05/03/19  1417 05/04/19  0514   * 129*   K 3.6 3.7   CL 88* 87*   CO2 24 28   BUN 9 9   CREATININE 0.6 0.7   GLUCOSE 139* 155*     LIVER PROFILE:   Recent Labs     05/03/19  1417   AST 32   ALT 18   BILITOT 0.9   ALKPHOS 120       CHEST XRAY 5/3/19:  Stable cardiomegaly.  Lungs are hyperinflated, consistent with COPD.  There   is no focal consolidation. No evidence of pleural effusion or pneumothorax.       No acute osseous abnormality. Bones are osteopenic. CHEST CT SCAN 5/3/19:  Pulmonary Arteries: Study is motion degraded.  Pulmonary arteries are   adequately opacified for evaluation.  No evidence of intraluminal filling   defect to suggest pulmonary embolism.  Main pulmonary artery is normal in   caliber.       Mediastinum: No evidence of mediastinal lymphadenopathy.  The heart and   pericardium demonstrate no acute abnormality.  Calcific coronary artery   disease.  The ascending aorta is ectatic measuring up to 4.1 cm in diameter. Descending thoracic aorta is normal in caliber.  The heart is mildly to   moderately enlarged.  There is no acute abnormality of the thoracic aorta.       Lungs/pleura:  There is a small area of confluent airspace disease in the

## 2019-05-04 NOTE — PROGRESS NOTES
4 Eyes Skin Assessment     The patient is being assess for  Admission    I agree that 2 RN's have performed a thorough Head to Toe Skin Assessment on the patient. ALL assessment sites listed below have been assessed. Areas assessed by both nurses:     [x]   Head, Face, and Ears   [x]   Shoulders, Back, and Chest  [x]   Arms, Elbows, and Hands   [x]   Coccyx, Sacrum, and IschIum  [x]   Legs, Feet, and Heels        Does the Patient have Skin Breakdown? Blanchable redness on coccyx and bilateral heels. Mepilex applied.          Charli Prevention initiated:  Yes   Wound Care Orders initiated:  No      [facility] nurse consulted for Pressure Injury (Stage 3,4, Unstageable, DTI, NWPT, and Complex wounds), New and Established Ostomies:  No      Nurse 1 eSignature: Electronically signed by Jannet Peralta RN on 5/3/19 at 10:42 PM    **SHARE this note so that the co-signing nurse is able to place an eSignature**    Nurse 2 eSignature: Electronically signed by Alicia Vaughn RN on 5/3/19 at 10:46 PM

## 2019-05-04 NOTE — CONSULTS
Infectious Diseases Inpatient Consult Note    Reason for Consult:   Pneumonia  Requesting Physician:   Dr Ollie Shay  Primary Care Physician:  Feliciano Giordano MD  History Obtained From:   Pt, [facility]    Admit Date: 5/3/2019  Hospital Day: 2    CHIEF COMPLAINT:       Chief Complaint   Patient presents with    Shortness of Breath     does not wear O2, states no HX with O2 problems, 81% on RA at SNF, is on a nonrebreather       HISTORY OF PRESENT ILLNESS:      81 yo woman with hx AF, dementia, CHF, hypothyroid, hx breast ca  Lives at [facility]. Presents with weakness, SOB. Assoc cough and productive sputum (noted once pt was at [facility]). Unclear how long pt has had sx. Lives at [facility], may have had sick contacts  At [facility], hypoxia (O2 81%) and transferred to [facility]. In ED 5/3 - afebrile, WBC 7.9. CXR neg. Chest CT with RUL posterior density. Started on ceftriaxone + azithromycin    Past Medical History:    Past Medical History:   Diagnosis Date    Atrial fibrillation ([address]     [facility]     breast cancer     CHF (congestive heart failure) ([facility]     Hyperlipidemia     Hypertension     Seasonal allergies     Thyroid disease        Past Surgical History:    Past Surgical History:   Procedure Laterality Date    EYE SURGERY      HIP SURGERY Left 7-26-14    LEFT HIP HEMIARTHROPLASTY                 HYSTERECTOMY         Current Medications:     [START ON 5/5/2019] furosemide  20 mg Oral Daily    ferrous sulfate  325 mg Oral BID WC    aspirin  324 mg Oral Once    levothyroxine  75 mcg Oral Daily    citalopram  10 mg Oral Daily    sodium chloride flush  10 mL Intravenous 2 times per day    lisinopril  5 mg Oral Daily    cetirizine  5 mg Oral Nightly    albuterol  2.5 mg Nebulization Q4H WA    azithromycin  500 mg Intravenous Q24H    And    cefTRIAXone (ROCEPHIN) IV  1 g Intravenous Q24H    enoxaparin  40 mg Subcutaneous BID       Allergies:  Patient has no known allergies.     Social History:    TOBACCO:    None ETOH:    None   DRUGS:   None     Patient lives ECF    Family History:   No immunodeficiency    REVIEW OF SYSTEMS:    No fever / chills / sweats. No weight loss. No visual change, eye pain, eye discharge. No oral lesion, sore throat, dysphagia.  + cough / sputum. Denies chest pain, palpitations. Denies n / v / abd pain. No diarrhea. Denies dysuria or change in urinary function. Denies joint swelling or pain. No myalgia, arthralgia. Denies skin changes, itching  Denies focal weakness, sensory change or other neurologic symptom    Denies new / worse depression, psychiatric symptoms  Denies any Endocrine or Hematologic symptoms    PHYSICAL EXAM:      Vitals:    /67   Pulse 71   Temp 98.3 °F (36.8 °C) (Temporal)   Resp 20   Ht 5' 2\" (1.575 m)   Wt 113 lb 11.2 oz (51.6 kg)   SpO2 95%   BMI 20.80 kg/m²     GENERAL: No apparent distress.     HEENT: Membranes moist, no oral lesion  NECK:  Supple  LUNGS: R base rales  CARDIAC: RRR, no murmur appreciated  ABD:  + BS, soft / NT  EXT:  No rash, no edema, no lesions  NEURO: No focal neurologic findings  PSYCH: Orientation, sensorium, mood normal  LINES:  Peripheral iv    DATA:    Lab Results   Component Value Date    WBC 8.4 2019    HGB 9.5 (L) 2019    HCT 29.8 (L) 2019    MCV 75.7 (L) 2019     2019     Lab Results   Component Value Date    CREATININE 0.7 2019    BUN 9 2019     (L) 2019    K 3.7 2019    CL 87 (L) 2019    CO2 28 2019       Hepatic Function Panel:   Lab Results   Component Value Date    ALKPHOS 120 2019    ALT 18 2019    AST 32 2019    PROT 6.9 2019    PROT 6.8 10/22/2012    BILITOT 0.9 2019    LABALBU 3.6 2019       Micro:  5/3 Legionella urinary ag neg  5/3 Pneumococcal urinary ag neg  5/3 Resp PCR neg    Imagin/4 Chest CT  No evidence of an acute pulmonary embolus.  Study was mildly motion degraded.       Airspace disease in the right upper lobe posterior segment consistent with   pneumonia.       There is suspicion of superimposed mild interstitial edema with trace   bilateral pleural fluid and mild to moderate cardiomegaly.       Subacute displaced overlapping fracture of the mid body of the sternum. Clinical correlation recommended. 5/4 CXR:   No acute cardiopulmonary process.  COPD    IMPRESSION:      Patient Active Problem List   Diagnosis    Other specified acquired hypothyroidism    A-fib ([address]    HTN (hypertension)    Hip fracture, left ([address]    CHF (congestive heart failure), NYHA class I, acute on chronic, combined ([address]    Hyponatremia    Elevated troponin    Community acquired bacterial pneumonia    Hypoxemia    Congestive heart failure ([address]       81 yo woman with AF, CHF presents with hypoxia and R posterior upper lobe infiltrate. Dx CAP - nursing home yet not high risk for Pseudomonas in my opinion. MRSA nasal PCR negative - strong predictor for not having lower resp MRSA (Everyone is at risk for MRSA! )    RECOMMENDATIONS:    Cont ceftriaxone + azithromycin  Can change azithro to po if reliably taking po meds  Will f/u on BC, sputum cult  Aspiration precautions    Discussed with pt, RN  Mohini Berg MD

## 2019-05-04 NOTE — PROGRESS NOTES
CLINICAL BEDSIDE SWALLOWING EVALUATION  Speech Therapy Department    Patient Name:  Priscilla Stovall  :  1925  Pain level:Denies   Medical Diagnosis:   CHF (congestive heart failure), NYHA class I, acute on chronic, combined ([facility] [I50.43]    HPI: \"80 y.o. female with PMHx of Atrial fibrillation, CHF, hyperlipidemia, hypertension and thyroid disease  presented to [facility] emergency room in which the patient states that today she was on her way to the bathroom. She began to feel weak and dizzy and collapsed to the floor. She denies losing consciousness. She denies hitting her head. She states \" I was just extremely short of breath\". Patient arrived by EMS and it was stated she was 81% on room air at the skilled nursing facility. Patient was placed on a nonrebreather satting 93%. Patient does not use oxygen at home. \"  CT chest revealed:  No evidence of an acute pulmonary embolus.  Study was mildly motion degraded.       Airspace disease in the right upper lobe posterior segment consistent with   pneumonia.       There is suspicion of superimposed mild interstitial edema with trace   bilateral pleural fluid and mild to moderate cardiomegaly. Treatment Diagnosis: Mild/Moderate Oropharyngeal Dysphagia    Impressions: Pt was positioned upright in bed by RN and SLP. Pt on O2 via nasal cannula. Various consistency trials were provided to assess swallow function. Thin liquids with multiple successive drinks and with multiple PO trials revealed overt clinical s/s of penetration/aspiration characterized by change in vocal quality and immediate coughing episodes. Nectar thick liquids did not reveal any overt clinical s/s of aspiration/penetration. Overall dysphagia was characterized by prolonged mastication, suspected decreased bolus cohesion, suspected pharyngeal pooling, delayed swallow initiation, decreased laryngeal elevation and overt clinical s/s of aspiration/penetration with thin liquids.  Pt demonstrates increased risk for aspiration/aspirationPNA due to respiratory status, suspected dysphagia and cognitive status. Dietary Recommendations:  1) Dysphagia III Advanced with NECTAR thick liquids, meds with puree or 1 at a time with nectar thick liquids  2) Allow thin water in isolation between meals s/p oral care     Strategies: 90 degree positioning with all p.o. intake; small bites/sips; alternate textures through meal; reduce rate of intake    Treatment/Goals: Speech therapy for dysphagia tx 3-5 times per week. ST.) Pt will tolerate recommended diet without s/s of aspiration   2.) Pt will tolerate MBS to r/o aspiration and determine appropriate diet/liquid level. 3.) Pt will tolerate diet advance to least restricted diet, as clinically indicated, with no signs of aspiration     Oral motor Exam:  Dentition: adequate   Labial/Facial: decreased coordination/ROM   Lingual: decreased ROM  Voice: functional     Oral Phase:   Prolonged mastication  Reduced A-P propulsion  Apparent premature bolus loss to pharynx  Multiple swallows to clear oral cavity of solid     Pharyngeal Phase:  Apparent pharyngeal pooling  Delayed swallow initiation  Decreased laryngeal elevation via palpation   Coughing (immediate) with thins   Coughing (delayed) with thins liquids   Increased WOB with thin liquids     Patient/Family Education:Education, results and recommendations given to the Pt and nurse, who verbalized understanding    Timed Code Treatment: 0 minutes     Total Treatment Time: 30 minutes     Discharge Recommendations: Speech Therapy for Speech/Dysphagia treatment at discharge.     Dorothea Campuzano, [facility]  Speech Language Pathologist

## 2019-05-05 LAB
ANION GAP SERPL CALCULATED.3IONS-SCNC: 12 MMOL/L (ref 3–16)
ANION GAP SERPL CALCULATED.3IONS-SCNC: 13 MMOL/L (ref 3–16)
ANION GAP SERPL CALCULATED.3IONS-SCNC: 13 MMOL/L (ref 3–16)
ANION GAP SERPL CALCULATED.3IONS-SCNC: 14 MMOL/L (ref 3–16)
BUN BLDV-MCNC: 17 MG/DL (ref 7–20)
BUN BLDV-MCNC: 18 MG/DL (ref 7–20)
BUN BLDV-MCNC: 27 MG/DL (ref 7–20)
CALCIUM SERPL-MCNC: 8.2 MG/DL (ref 8.3–10.6)
CALCIUM SERPL-MCNC: 8.3 MG/DL (ref 8.3–10.6)
CALCIUM SERPL-MCNC: 8.3 MG/DL (ref 8.3–10.6)
CALCIUM SERPL-MCNC: 8.4 MG/DL (ref 8.3–10.6)
CALCIUM SERPL-MCNC: 8.4 MG/DL (ref 8.3–10.6)
CALCIUM SERPL-MCNC: 8.6 MG/DL (ref 8.3–10.6)
CHLORIDE BLD-SCNC: 80 MMOL/L (ref 99–110)
CHLORIDE BLD-SCNC: 81 MMOL/L (ref 99–110)
CHLORIDE BLD-SCNC: 82 MMOL/L (ref 99–110)
CHLORIDE BLD-SCNC: 84 MMOL/L (ref 99–110)
CO2: 24 MMOL/L (ref 21–32)
CO2: 26 MMOL/L (ref 21–32)
CO2: 27 MMOL/L (ref 21–32)
CO2: 28 MMOL/L (ref 21–32)
CREAT SERPL-MCNC: 0.7 MG/DL (ref 0.6–1.2)
CREAT SERPL-MCNC: 0.7 MG/DL (ref 0.6–1.2)
CREAT SERPL-MCNC: 0.8 MG/DL (ref 0.6–1.2)
CREAT SERPL-MCNC: 0.9 MG/DL (ref 0.6–1.2)
CREATININE URINE: 233.2 MG/DL (ref 28–259)
GFR AFRICAN AMERICAN: >60
GFR NON-AFRICAN AMERICAN: 58
GFR NON-AFRICAN AMERICAN: >60
GLUCOSE BLD-MCNC: 134 MG/DL (ref 70–99)
GLUCOSE BLD-MCNC: 139 MG/DL (ref 70–99)
GLUCOSE BLD-MCNC: 145 MG/DL (ref 70–99)
GLUCOSE BLD-MCNC: 149 MG/DL (ref 70–99)
GLUCOSE BLD-MCNC: 149 MG/DL (ref 70–99)
GLUCOSE BLD-MCNC: 220 MG/DL (ref 70–99)
MAGNESIUM: 1.9 MG/DL (ref 1.8–2.4)
POTASSIUM SERPL-SCNC: 3.4 MMOL/L (ref 3.5–5.1)
POTASSIUM SERPL-SCNC: 3.8 MMOL/L (ref 3.5–5.1)
POTASSIUM SERPL-SCNC: 3.9 MMOL/L (ref 3.5–5.1)
POTASSIUM SERPL-SCNC: 3.9 MMOL/L (ref 3.5–5.1)
POTASSIUM SERPL-SCNC: 4 MMOL/L (ref 3.5–5.1)
POTASSIUM SERPL-SCNC: 4.1 MMOL/L (ref 3.5–5.1)
PRO-BNP: 5602 PG/ML (ref 0–449)
SODIUM BLD-SCNC: 117 MMOL/L (ref 136–145)
SODIUM BLD-SCNC: 118 MMOL/L (ref 136–145)
SODIUM BLD-SCNC: 118 MMOL/L (ref 136–145)
SODIUM BLD-SCNC: 122 MMOL/L (ref 136–145)
SODIUM BLD-SCNC: 122 MMOL/L (ref 136–145)
SODIUM BLD-SCNC: 123 MMOL/L (ref 136–145)
SODIUM URINE: <20 MMOL/L
UREA NITROGEN, UR: 476.4 MG/DL (ref 800–1666)

## 2019-05-05 PROCEDURE — 1200000000 HC SEMI PRIVATE

## 2019-05-05 PROCEDURE — 83735 ASSAY OF MAGNESIUM: CPT

## 2019-05-05 PROCEDURE — 2580000003 HC RX 258: Performed by: INTERNAL MEDICINE

## 2019-05-05 PROCEDURE — 6370000000 HC RX 637 (ALT 250 FOR IP): Performed by: NURSE PRACTITIONER

## 2019-05-05 PROCEDURE — 2700000000 HC OXYGEN THERAPY PER DAY

## 2019-05-05 PROCEDURE — 6360000002 HC RX W HCPCS: Performed by: NURSE PRACTITIONER

## 2019-05-05 PROCEDURE — 84300 ASSAY OF URINE SODIUM: CPT

## 2019-05-05 PROCEDURE — 80048 BASIC METABOLIC PNL TOTAL CA: CPT

## 2019-05-05 PROCEDURE — 36415 COLL VENOUS BLD VENIPUNCTURE: CPT

## 2019-05-05 PROCEDURE — 6370000000 HC RX 637 (ALT 250 FOR IP): Performed by: INTERNAL MEDICINE

## 2019-05-05 PROCEDURE — 82570 ASSAY OF URINE CREATININE: CPT

## 2019-05-05 PROCEDURE — 6360000002 HC RX W HCPCS: Performed by: INTERNAL MEDICINE

## 2019-05-05 PROCEDURE — 94640 AIRWAY INHALATION TREATMENT: CPT

## 2019-05-05 PROCEDURE — 83880 ASSAY OF NATRIURETIC PEPTIDE: CPT

## 2019-05-05 PROCEDURE — 94760 N-INVAS EAR/PLS OXIMETRY 1: CPT

## 2019-05-05 PROCEDURE — 84540 ASSAY OF URINE/UREA-N: CPT

## 2019-05-05 PROCEDURE — 2580000003 HC RX 258: Performed by: NURSE PRACTITIONER

## 2019-05-05 PROCEDURE — 83935 ASSAY OF URINE OSMOLALITY: CPT

## 2019-05-05 RX ORDER — FUROSEMIDE 10 MG/ML
40 INJECTION INTRAMUSCULAR; INTRAVENOUS ONCE
Status: COMPLETED | OUTPATIENT
Start: 2019-05-05 | End: 2019-05-05

## 2019-05-05 RX ORDER — LEVALBUTEROL INHALATION SOLUTION 0.63 MG/3ML
0.63 SOLUTION RESPIRATORY (INHALATION) ONCE
Status: COMPLETED | OUTPATIENT
Start: 2019-05-05 | End: 2019-05-05

## 2019-05-05 RX ORDER — SODIUM CHLORIDE 9 MG/ML
INJECTION, SOLUTION INTRAVENOUS CONTINUOUS
Status: DISCONTINUED | OUTPATIENT
Start: 2019-05-05 | End: 2019-05-05

## 2019-05-05 RX ORDER — SODIUM CHLORIDE 1000 MG
1 TABLET, SOLUBLE MISCELLANEOUS
Status: DISCONTINUED | OUTPATIENT
Start: 2019-05-05 | End: 2019-05-06

## 2019-05-05 RX ADMIN — ALBUTEROL SULFATE 2.5 MG: 2.5 SOLUTION RESPIRATORY (INHALATION) at 21:34

## 2019-05-05 RX ADMIN — Medication 10 MEQ: at 00:48

## 2019-05-05 RX ADMIN — Medication 10 ML: at 08:21

## 2019-05-05 RX ADMIN — ALBUTEROL SULFATE 2.5 MG: 2.5 SOLUTION RESPIRATORY (INHALATION) at 13:15

## 2019-05-05 RX ADMIN — Medication 10 ML: at 21:30

## 2019-05-05 RX ADMIN — FUROSEMIDE 40 MG: 10 INJECTION, SOLUTION INTRAMUSCULAR; INTRAVENOUS at 19:38

## 2019-05-05 RX ADMIN — RIVAROXABAN 15 MG: 15 TABLET, FILM COATED ORAL at 08:20

## 2019-05-05 RX ADMIN — SODIUM CHLORIDE: 9 INJECTION, SOLUTION INTRAVENOUS at 08:22

## 2019-05-05 RX ADMIN — AZITHROMYCIN MONOHYDRATE 500 MG: 500 INJECTION, POWDER, LYOPHILIZED, FOR SOLUTION INTRAVENOUS at 08:20

## 2019-05-05 RX ADMIN — FERROUS SULFATE TAB 325 MG (65 MG ELEMENTAL FE) 325 MG: 325 (65 FE) TAB at 08:20

## 2019-05-05 RX ADMIN — ALBUTEROL SULFATE 2.5 MG: 2.5 SOLUTION RESPIRATORY (INHALATION) at 17:15

## 2019-05-05 RX ADMIN — Medication 15 G: at 21:30

## 2019-05-05 RX ADMIN — CITALOPRAM HYDROBROMIDE 10 MG: 20 TABLET ORAL at 08:19

## 2019-05-05 RX ADMIN — LEVALBUTEROL HYDROCHLORIDE 0.63 MG: 0.63 SOLUTION RESPIRATORY (INHALATION) at 09:26

## 2019-05-05 RX ADMIN — SODIUM CHLORIDE TAB 1 GM 1 G: 1 TAB at 19:37

## 2019-05-05 RX ADMIN — LEVOTHYROXINE SODIUM 75 MCG: 75 TABLET ORAL at 06:00

## 2019-05-05 RX ADMIN — FERROUS SULFATE TAB 325 MG (65 MG ELEMENTAL FE) 325 MG: 325 (65 FE) TAB at 17:12

## 2019-05-05 RX ADMIN — Medication 1 G: at 08:21

## 2019-05-05 RX ADMIN — SODIUM CHLORIDE TAB 1 GM 1 G: 1 TAB at 16:25

## 2019-05-05 RX ADMIN — CETIRIZINE HYDROCHLORIDE 5 MG: 10 TABLET, FILM COATED ORAL at 21:30

## 2019-05-05 ASSESSMENT — PAIN SCALES - GENERAL
PAINLEVEL_OUTOF10: 0

## 2019-05-05 NOTE — PLAN OF CARE
Problem: Falls - Risk of:  Goal: Will remain free from falls  Description  Will remain free from falls  Note:   Pt has remained free from any falls so far this shift. Bed alarm activated. Non-skid socks on. Erickson cath in place. Bed in lowest and locked position. Belongings within reach. Call light within reach. Will continue to monitor.

## 2019-05-05 NOTE — CONSULTS
Nephrology Consult Note      Patient's Name: Didi Morales  9:05 AM  5/5/2019    Reason for Consult:  Hyponatremia , Low urine output       Requesting Physician:  Joanie Ormond, MD      Chief Complaint:    Chief Complaint   Patient presents with    Shortness of Breath     does not wear O2, states no HX with O2 problems, 81% on RA at SNF, is on a nonrebreather         History of Present iIlness:    Didi Morales is a 80 y.o. female with h/o a . Fib, CHF, HTN was admitted with generalized weakness. She began to feel weak and dizzy and collapsed to the floor. She denies losing consciousness. She denies hitting her head. She states \" I was just extremely short of breath\". Patient arrived by EMS and it was stated she was 81% on room air at the skilled nursing facility. Patient was placed on a nonrebreather satting 93%. Patient does not use oxygen at home. Yesterday she has very low urine output   On 3 L oxygen per NC now     Gave her lasix 40 mg iv x 1 yesterday   Sodium level low at 123   Has poor oral intake       Past Medical History:   Diagnosis Date    Atrial fibrillation (HCC)     [facility] ([facility]     breast cancer     CHF (congestive heart failure) ([facility]     Hyperlipidemia     Hypertension     Seasonal allergies     Thyroid disease        Past Surgical History:   Procedure Laterality Date    EYE SURGERY      HIP SURGERY Left 7-26-14    LEFT HIP HEMIARTHROPLASTY                 HYSTERECTOMY         History reviewed. No pertinent family history. reports that she has never smoked. She has never used smokeless tobacco. She reports that she does not drink alcohol or use drugs. Allergies:  Patient has no known allergies.     Current Medications:      levalbuterol (XOPENEX) nebulization 0.63 mg Once   0.9 % sodium chloride infusion Continuous   ferrous sulfate tablet 325 mg BID WC   rivaroxaban (XARELTO) tablet 15 mg Daily   aspirin chewable tablet 324 mg Once   levothyroxine (SYNTHROID) tablet 75 mcg Daily   citalopram (CELEXA) tablet 10 mg Daily   sodium chloride flush 0.9 % injection 10 mL 2 times per day   sodium chloride flush 0.9 % injection 10 mL PRN   magnesium hydroxide (MILK OF MAGNESIA) 400 MG/5ML suspension 30 mL Daily PRN   ondansetron (ZOFRAN) injection 4 mg Q6H PRN   potassium chloride 10 mEq/100 mL IVPB (Peripheral Line) PRN   magnesium sulfate 1 g in dextrose 5% 100 mL IVPB PRN   acetaminophen (TYLENOL) tablet 650 mg Q4H PRN   cetirizine (ZYRTEC) tablet 5 mg Nightly   albuterol (PROVENTIL) nebulizer solution 2.5 mg Q4H WA   azithromycin (ZITHROMAX) 500 mg in D5W 250ml Vial Mate Q24H   And    cefTRIAXone (ROCEPHIN) 1 g in sterile water 10 mL IV syringe Q24H       Review of Systems:   14 point ROS obtained but were negative except mentioned in HPI      Physical exam:     Vitals:  BP (!) 148/77   Pulse 71   Temp 98.2 °F (36.8 °C) (Temporal)   Resp 20   Ht 5' 2\" (1.575 m)   Wt 111 lb 4.8 oz (50.5 kg)   SpO2 96%   BMI 20.36 kg/m²   Constitutional:  OAA X3 NAD  Skin: no rash, turgor wnl  Heent:  eomi, mmm  Neck: no bruits or jvd noted  Cardiovascular:  S1, S2 without m/r/g  Respiratory: Rales +  Abdomen:  +bs, soft, nt, nd  Ext: no lower extremity edema  Psychiatric: mood and affect appropriate  Musculoskeletal:  Rom, muscular strength intact    Labs:  CBC:   Recent Labs     05/03/19  1417 05/04/19  0514   WBC 7.9 8.4   HGB 10.1* 9.5*    257     BMP:  Recent Labs     05/04/19  1707 05/05/19  0411 05/05/19  0736   * 122* 123*   K 3.3* 3.8 3.9   CL 83* 82* 84*   CO2 28 26 27   BUN 15 17 17   CREATININE 1.0 0.9 0.8   GLUCOSE 147* 149* 134*     Ca/Mg/Phos: Recent Labs     05/04/19  0514 05/04/19  1707 05/05/19  0411 05/05/19  0736   CALCIUM 8.4 8.6 8.3 8.4   MG 2.00  --  1.90  --      Hepatic: Recent Labs     05/03/19  1417   AST 32   ALT 18   BILITOT 0.9   ALKPHOS 120     Troponin:   Recent Labs     05/03/19  1417 05/03/19  2114 05/04/19  0114   TROPONINI 0.03* 0.04* output  Monitor neurological status. If any change  please notify me. 2. Anuria. UO started improving today morning   Has 400 ml in nassar bag     3. Anemia. Has iron deficiency anemia. Will give iv venofer     4. Acid- base disorder.  Hypochloremic  Metabolic alkalosis   Continue NS    D/w Dr. Ruslan Carpenter in 4 hrs                  Thank you for allowing us to participate in care of Scotty Dexter         Electronically signed by: Roverto Rondon MD, 5/5/2019, 9:05 AM      [facility] [address]  Office : [phone]  Fax :[phone]

## 2019-05-05 NOTE — PROGRESS NOTES
D/w Dr. Roblero Numbers. Sodium dropped to 122- ---> 118   Sodium chloride tablet was given at 4 : 25 pm ( ordered at 1 : 38 pm when sodium was reported by 122 by RN ) . Since it was given after the sodium is reported 118 do not expect any effect of sodium chloride tablet. Will give 1 gram sodium chloride tablet . Urine sodium is < 20 . Will give lasix 40 mg iv x1 .    Will start urea 15 gram po bid   Sodium check every 4 hrs   Report any neurological changes  Call with sodium results every 4 hrs

## 2019-05-05 NOTE — PLAN OF CARE
Problem: Falls - Risk of:  Goal: Will remain free from falls  Description  Will remain free from falls  5/5/2019 1105 by Ethel Booth RN  Outcome: Ongoing  Note:   Fall precautions in place, bed alarm on, nonskid foot wear applied, bed in lowest position, and call light within reach. Will continue to monitor. Problem: Risk for Impaired Skin Integrity  Goal: Tissue integrity - skin and mucous membranes  Description  Structural intactness and normal physiological function of skin and  mucous membranes. Outcome: Ongoing  Note:   Patient assisted with turning at least every 2 hours, or found to be in a different position. Skin assessed for breakdown.

## 2019-05-05 NOTE — PROGRESS NOTES
[facility] PROGRESS NOTE    5/5/2019 6:27 AM        Name: Caryn Anaya . Admitted: 5/3/2019  Primary Care Provider: Josephine Tan MD (Tel: [phone])      Subjective: Ana Maria Pollard Pt is lying in bed she looks tired   She denies any CP or dizziness    Reviewed interval ancillary notes    Current Medications    ferrous sulfate tablet 325 mg BID WC   rivaroxaban (XARELTO) tablet 15 mg Daily   0.9 % sodium chloride infusion Continuous   aspirin chewable tablet 324 mg Once   levothyroxine (SYNTHROID) tablet 75 mcg Daily   citalopram (CELEXA) tablet 10 mg Daily   sodium chloride flush 0.9 % injection 10 mL 2 times per day   sodium chloride flush 0.9 % injection 10 mL PRN   magnesium hydroxide (MILK OF MAGNESIA) 400 MG/5ML suspension 30 mL Daily PRN   ondansetron (ZOFRAN) injection 4 mg Q6H PRN   potassium chloride 10 mEq/100 mL IVPB (Peripheral Line) PRN   magnesium sulfate 1 g in dextrose 5% 100 mL IVPB PRN   acetaminophen (TYLENOL) tablet 650 mg Q4H PRN   cetirizine (ZYRTEC) tablet 5 mg Nightly   albuterol (PROVENTIL) nebulizer solution 2.5 mg Q4H WA   azithromycin (ZITHROMAX) 500 mg in D5W 250ml Vial Mate Q24H   And    cefTRIAXone (ROCEPHIN) 1 g in sterile water 10 mL IV syringe Q24H       Objective:  /68   Pulse 74   Temp 97.6 °F (36.4 °C) (Temporal)   Resp 22   Ht 5' 2\" (1.575 m)   Wt 111 lb 4.8 oz (50.5 kg)   SpO2 90%   BMI 20.36 kg/m²     Intake/Output Summary (Last 24 hours) at 5/5/2019 0627  Last data filed at 5/5/2019 0428  Gross per 24 hour   Intake 1010 ml   Output 230 ml   Net 780 ml      Wt Readings from Last 3 Encounters:   05/05/19 111 lb 4.8 oz (50.5 kg)   10/18/18 117 lb (53.1 kg)   04/19/18 114 lb (51.7 kg)       General appearance:  Appears comfortable  Eyes: Sclera clear. Pupils equal.  ENT: Moist oral mucosa. Trachea midline, no adenopathy.   Cardiovascular: Regular rhythm, normal S1, S2. No murmur. No edema in lower extremities  Respiratory: Not using accessory muscles. Good inspiratory effort. Clear to auscultation bilaterally, no wheeze or crackles. GI: Abdomen soft, no tenderness, not distended, normal bowel sounds  Musculoskeletal: No cyanosis in digits, neck supple  Neurology: CN 2-12 grossly intact. No speech or motor deficits  Psych: Normal affect. Alert and oriented in time, place and person  Skin: Warm, dry, normal turgor    Labs and Tests:  CBC:   Recent Labs     05/03/19  1417 05/04/19  0514   WBC 7.9 8.4   HGB 10.1* 9.5*    257     BMP:    Recent Labs     05/04/19  0514 05/04/19  1707 05/05/19  0411   * 125* 122*   K 3.7 3.3* 3.8   CL 87* 83* 82*   CO2 28 28 26   BUN 9 15 17   CREATININE 0.7 1.0 0.9   GLUCOSE 155* 147* 149*     Hepatic:   Recent Labs     05/03/19  1417   AST 32   ALT 18   BILITOT 0.9   ALKPHOS 120       Problem List  Active Problems:    A-fib (HCC)    HTN (hypertension)    CHF (congestive heart failure), NYHA class I, acute on chronic, combined (HCC)    Hyponatremia    Elevated troponin    Community acquired bacterial pneumonia    Hypoxemia    Congestive heart failure (HCC)    Hypoxia    Pneumonia due to organism    Acute respiratory failure with hypoxia (HCC)  Resolved Problems:    * No resolved hospital problems. *       Assessment & Plan:    acute CHF exacerbation, unclear if it is daistolic or systolic,   -Echo ordered,   -BNP is worse  -received lasix 40 mg   -cardiology followed  - strict Is and Os    Oliguria/unuria, pt has minimal urine outpt in the last 24 hrs, ? Intravascular depletion vs cardiorenal syndrome, she was started on saline slow rate and  Nephrology consulted, she received 40 mg IV lasix and she put only 100 CC as per RN, urine electrolyte was ordered, appreciate nephrology input    Elevated troponin  -  Pt denies chest pain, and there are not ischemic changes on the initial EKG.  Cardiology consulted appreciate recs     Pneumonia, likely bacterial organism is not specified, on ABx  - evident on CT  - follow up on blood cultures  - speech and language tech evaluated the pt and diet modified to Dysphagia III  - Rocephin and Zithromax  - Patient was hypoxic at 81% when EMS arrived. Currently on 6 L high flow setting 97% we'll wean accordingly  - Pulmonary/Infectious disease services were consulted, appreciate recommendations    Acute hypoxic RESP Failure, 2/2 above  -Secondary to pneumonia and CHF. Responded nicely to oxygen per nasal cannula  -We'll wean to keep sats greater than 93%     Hyponatremia, worse this AM, continue to restrict H2O, urine electrolyte was collected, Nephrology consulted, appreciate recs     Atrial fibrillation   -Currently taking Xarelto  -Rate controlled  -Admit to telemetry continued to monitor, high risk of RVR secondary to hypoxia and pneumonia and CHF     Hypertension  - Maintaining current medications  -Monitor closely     Diet: DIET CARDIAC; Dysphagia III Advanced;  Nectar Thick; Daily Fluid Restriction: 1800 ml  Code:DNR-CC  DVT PPX xarelto      Katherine Jade MD   5/5/2019 6:27 AM

## 2019-05-06 LAB
ANION GAP SERPL CALCULATED.3IONS-SCNC: 10 MMOL/L (ref 3–16)
ANION GAP SERPL CALCULATED.3IONS-SCNC: 11 MMOL/L (ref 3–16)
ANION GAP SERPL CALCULATED.3IONS-SCNC: 11 MMOL/L (ref 3–16)
ANION GAP SERPL CALCULATED.3IONS-SCNC: 13 MMOL/L (ref 3–16)
ANION GAP SERPL CALCULATED.3IONS-SCNC: 13 MMOL/L (ref 3–16)
ANION GAP SERPL CALCULATED.3IONS-SCNC: 15 MMOL/L (ref 3–16)
BUN BLDV-MCNC: 23 MG/DL (ref 7–20)
BUN BLDV-MCNC: 25 MG/DL (ref 7–20)
BUN BLDV-MCNC: 28 MG/DL (ref 7–20)
BUN BLDV-MCNC: 31 MG/DL (ref 7–20)
BUN BLDV-MCNC: 31 MG/DL (ref 7–20)
BUN BLDV-MCNC: 37 MG/DL (ref 7–20)
CALCIUM SERPL-MCNC: 8.3 MG/DL (ref 8.3–10.6)
CALCIUM SERPL-MCNC: 8.5 MG/DL (ref 8.3–10.6)
CALCIUM SERPL-MCNC: 8.5 MG/DL (ref 8.3–10.6)
CALCIUM SERPL-MCNC: 8.6 MG/DL (ref 8.3–10.6)
CALCIUM SERPL-MCNC: 8.6 MG/DL (ref 8.3–10.6)
CALCIUM SERPL-MCNC: 8.7 MG/DL (ref 8.3–10.6)
CHLORIDE BLD-SCNC: 80 MMOL/L (ref 99–110)
CHLORIDE BLD-SCNC: 81 MMOL/L (ref 99–110)
CHLORIDE BLD-SCNC: 81 MMOL/L (ref 99–110)
CHLORIDE BLD-SCNC: 82 MMOL/L (ref 99–110)
CO2: 28 MMOL/L (ref 21–32)
CO2: 29 MMOL/L (ref 21–32)
CO2: 29 MMOL/L (ref 21–32)
CREAT SERPL-MCNC: 0.5 MG/DL (ref 0.6–1.2)
CREAT SERPL-MCNC: 0.6 MG/DL (ref 0.6–1.2)
GFR AFRICAN AMERICAN: >60
GFR NON-AFRICAN AMERICAN: >60
GLUCOSE BLD-MCNC: 111 MG/DL (ref 70–99)
GLUCOSE BLD-MCNC: 120 MG/DL (ref 70–99)
GLUCOSE BLD-MCNC: 126 MG/DL (ref 70–99)
GLUCOSE BLD-MCNC: 132 MG/DL (ref 70–99)
GLUCOSE BLD-MCNC: 138 MG/DL (ref 70–99)
GLUCOSE BLD-MCNC: 141 MG/DL (ref 70–99)
MAGNESIUM: 1.9 MG/DL (ref 1.8–2.4)
OSMOLALITY URINE: 455 MOSM/KG (ref 390–1070)
POTASSIUM SERPL-SCNC: 3.3 MMOL/L (ref 3.5–5.1)
POTASSIUM SERPL-SCNC: 3.4 MMOL/L (ref 3.5–5.1)
POTASSIUM SERPL-SCNC: 3.6 MMOL/L (ref 3.5–5.1)
POTASSIUM SERPL-SCNC: 3.6 MMOL/L (ref 3.5–5.1)
PROCALCITONIN: 0.19 NG/ML (ref 0–0.15)
SODIUM BLD-SCNC: 121 MMOL/L (ref 136–145)
SODIUM BLD-SCNC: 122 MMOL/L (ref 136–145)
SODIUM BLD-SCNC: 125 MMOL/L (ref 136–145)

## 2019-05-06 PROCEDURE — 6370000000 HC RX 637 (ALT 250 FOR IP): Performed by: INTERNAL MEDICINE

## 2019-05-06 PROCEDURE — 6370000000 HC RX 637 (ALT 250 FOR IP): Performed by: NURSE PRACTITIONER

## 2019-05-06 PROCEDURE — 94760 N-INVAS EAR/PLS OXIMETRY 1: CPT

## 2019-05-06 PROCEDURE — 94667 MNPJ CHEST WALL 1ST: CPT

## 2019-05-06 PROCEDURE — 1200000000 HC SEMI PRIVATE

## 2019-05-06 PROCEDURE — 2580000003 HC RX 258: Performed by: NURSE PRACTITIONER

## 2019-05-06 PROCEDURE — 92526 ORAL FUNCTION THERAPY: CPT

## 2019-05-06 PROCEDURE — 99233 SBSQ HOSP IP/OBS HIGH 50: CPT | Performed by: INTERNAL MEDICINE

## 2019-05-06 PROCEDURE — 94640 AIRWAY INHALATION TREATMENT: CPT

## 2019-05-06 PROCEDURE — 80048 BASIC METABOLIC PNL TOTAL CA: CPT

## 2019-05-06 PROCEDURE — 2580000003 HC RX 258: Performed by: INTERNAL MEDICINE

## 2019-05-06 PROCEDURE — 84145 PROCALCITONIN (PCT): CPT

## 2019-05-06 PROCEDURE — 99232 SBSQ HOSP IP/OBS MODERATE 35: CPT | Performed by: INTERNAL MEDICINE

## 2019-05-06 PROCEDURE — 6360000002 HC RX W HCPCS: Performed by: NURSE PRACTITIONER

## 2019-05-06 PROCEDURE — 2700000000 HC OXYGEN THERAPY PER DAY

## 2019-05-06 PROCEDURE — 36415 COLL VENOUS BLD VENIPUNCTURE: CPT

## 2019-05-06 PROCEDURE — 83735 ASSAY OF MAGNESIUM: CPT

## 2019-05-06 PROCEDURE — 6370000000 HC RX 637 (ALT 250 FOR IP): Performed by: FAMILY MEDICINE

## 2019-05-06 PROCEDURE — 6360000002 HC RX W HCPCS: Performed by: INTERNAL MEDICINE

## 2019-05-06 RX ORDER — LACTOBACILLUS RHAMNOSUS GG 10B CELL
1 CAPSULE ORAL 2 TIMES DAILY WITH MEALS
Status: DISCONTINUED | OUTPATIENT
Start: 2019-05-06 | End: 2019-05-11 | Stop reason: HOSPADM

## 2019-05-06 RX ORDER — LEVOFLOXACIN 500 MG/1
750 TABLET, FILM COATED ORAL EVERY OTHER DAY
Status: DISCONTINUED | OUTPATIENT
Start: 2019-05-06 | End: 2019-05-11 | Stop reason: HOSPADM

## 2019-05-06 RX ORDER — SODIUM CHLORIDE 1000 MG
1 TABLET, SOLUBLE MISCELLANEOUS ONCE
Status: COMPLETED | OUTPATIENT
Start: 2019-05-06 | End: 2019-05-06

## 2019-05-06 RX ORDER — FUROSEMIDE 10 MG/ML
20 INJECTION INTRAMUSCULAR; INTRAVENOUS ONCE
Status: COMPLETED | OUTPATIENT
Start: 2019-05-06 | End: 2019-05-06

## 2019-05-06 RX ORDER — SODIUM CHLORIDE 1000 MG
1 TABLET, SOLUBLE MISCELLANEOUS 2 TIMES DAILY WITH MEALS
Status: DISCONTINUED | OUTPATIENT
Start: 2019-05-06 | End: 2019-05-06

## 2019-05-06 RX ORDER — SODIUM CHLORIDE 1000 MG
1 TABLET, SOLUBLE MISCELLANEOUS
Status: DISCONTINUED | OUTPATIENT
Start: 2019-05-06 | End: 2019-05-08

## 2019-05-06 RX ADMIN — ALBUTEROL SULFATE 2.5 MG: 2.5 SOLUTION RESPIRATORY (INHALATION) at 07:21

## 2019-05-06 RX ADMIN — SODIUM CHLORIDE TAB 1 GM 1 G: 1 TAB at 15:53

## 2019-05-06 RX ADMIN — LEVOFLOXACIN 750 MG: 500 TABLET, FILM COATED ORAL at 07:50

## 2019-05-06 RX ADMIN — POTASSIUM BICARBONATE 20 MEQ: 782 TABLET, EFFERVESCENT ORAL at 14:13

## 2019-05-06 RX ADMIN — LEVOTHYROXINE SODIUM 75 MCG: 75 TABLET ORAL at 06:42

## 2019-05-06 RX ADMIN — Medication 1 CAPSULE: at 19:52

## 2019-05-06 RX ADMIN — IRON SUCROSE 100 MG: 20 INJECTION, SOLUTION INTRAVENOUS at 12:14

## 2019-05-06 RX ADMIN — ALBUTEROL SULFATE 2.5 MG: 2.5 SOLUTION RESPIRATORY (INHALATION) at 11:19

## 2019-05-06 RX ADMIN — FUROSEMIDE 20 MG: 10 INJECTION, SOLUTION INTRAMUSCULAR; INTRAVENOUS at 09:26

## 2019-05-06 RX ADMIN — Medication 1 G: at 09:28

## 2019-05-06 RX ADMIN — AZITHROMYCIN MONOHYDRATE 500 MG: 500 INJECTION, POWDER, LYOPHILIZED, FOR SOLUTION INTRAVENOUS at 09:26

## 2019-05-06 RX ADMIN — CITALOPRAM HYDROBROMIDE 10 MG: 20 TABLET ORAL at 09:26

## 2019-05-06 RX ADMIN — Medication 15 G: at 21:30

## 2019-05-06 RX ADMIN — SODIUM CHLORIDE TAB 1 GM 1 G: 1 TAB at 21:15

## 2019-05-06 RX ADMIN — CETIRIZINE HYDROCHLORIDE 5 MG: 10 TABLET, FILM COATED ORAL at 21:14

## 2019-05-06 RX ADMIN — SODIUM CHLORIDE TAB 1 GM 1 G: 1 TAB at 09:28

## 2019-05-06 RX ADMIN — Medication 10 ML: at 09:26

## 2019-05-06 RX ADMIN — Medication 15 G: at 11:45

## 2019-05-06 RX ADMIN — Medication 10 ML: at 21:14

## 2019-05-06 RX ADMIN — ALBUTEROL SULFATE 2.5 MG: 2.5 SOLUTION RESPIRATORY (INHALATION) at 20:07

## 2019-05-06 RX ADMIN — RIVAROXABAN 15 MG: 15 TABLET, FILM COATED ORAL at 09:28

## 2019-05-06 RX ADMIN — FERROUS SULFATE TAB 325 MG (65 MG ELEMENTAL FE) 325 MG: 325 (65 FE) TAB at 09:26

## 2019-05-06 ASSESSMENT — PAIN SCALES - GENERAL
PAINLEVEL_OUTOF10: 0

## 2019-05-06 NOTE — PROGRESS NOTES
polyuria, polydipsia, vomiting and diarrhea and temperature intolerance  Allergic/Immunologic: negative for anaphylaxis, angioedema, hay fever and urticaria    Objective:     Patient Vitals for the past 8 hrs:   BP Temp Temp src Pulse Resp SpO2 Weight   05/06/19 1119 -- -- -- -- 18 94 % --   05/06/19 0900 (!) 149/65 98.4 °F (36.9 °C) Temporal 73 20 99 % --   05/06/19 0722 -- -- -- -- 22 94 % --   05/06/19 0645 -- -- -- -- -- -- 110 lb (49.9 kg)     I/O last 3 completed shifts:   In: 840 [P.O.:840]  Out: 1500 [Urine:1000; Drains:500]  I/O this shift:  In: 240 [P.O.:240]  Out: -     General Appearance: alert and oriented to person, place and time, well developed and well- nourished, in no acute distress  Skin: warm and dry, no rash or erythema  Head: normocephalic and atraumatic  Eyes: pupils equal, round, and reactive to light, extraocular eye movements intact, conjunctivae normal  ENT: external ear and ear canal normal bilaterally, nose without deformity, nasal mucosa and turbinates normal  Neck: supple and non-tender without mass, no cervical lymphadenopathy  Pulmonary/Chest: clear to auscultation bilaterally- no wheezes, rales or rhonchi, normal air movement, no respiratory distress  Cardiovascular: normal rate, regular rhythm,  no murmurs, rubs, distal pulses intact, no carotid bruits  Abdomen: soft, non-tender, non-distended, normal bowel sounds, no masses or organomegaly  Lymph Nodes: Cervical, supraclavicular normal  Extremities: no cyanosis, clubbing or edema  Musculoskeletal: normal range of motion, no joint swelling, deformity or tenderness  Neurologic: alert, no focal neurologic deficits    Data Review:  CBC:   Lab Results   Component Value Date    WBC 8.4 05/04/2019    RBC 3.94 05/04/2019     BMP:   Lab Results   Component Value Date    GLUCOSE 138 05/06/2019    CO2 28 05/06/2019    BUN 23 05/06/2019    CREATININE 0.6 05/06/2019    CALCIUM 8.5 05/06/2019     ABG: No results found for: LDE0MRG, BEART, Y4FASBJV, PHART, THGBART, VUU4NXS, PO2ART, [place]    Radiology: All pertinent images / reports were reviewed as a part of this visit. Narrative   EXAMINATION:   CTA OF THE CHEST 5/3/2019 5:15 pm       TECHNIQUE:   CTA of the chest was performed after the administration of intravenous   contrast.  Multiplanar reformatted images are provided for review.  MIP   images are provided for review. Dose modulation, iterative reconstruction,   and/or weight based adjustment of the mA/kV was utilized to reduce the   radiation dose to as low as reasonably achievable.       COMPARISON:   Chest x-ray 05/03/2019       HISTORY:   ORDERING SYSTEM PROVIDED HISTORY: SOB, hypoxia, eval for PE   TECHNOLOGIST PROVIDED HISTORY:   Ordering Physician Provided Reason for Exam: SOB, hypoxia, eval for PE   Acuity: Acute   Type of Exam: Initial   Relevant Medical/Surgical History: Shortness of Breath (does not wear O2,   states no HX with O2 problems, 81% on RA at SNF, is on a nonrebreather)       FINDINGS:   Pulmonary Arteries: Study is motion degraded.  Pulmonary arteries are   adequately opacified for evaluation.  No evidence of intraluminal filling   defect to suggest pulmonary embolism.  Main pulmonary artery is normal in   caliber.       Mediastinum: No evidence of mediastinal lymphadenopathy.  The heart and   pericardium demonstrate no acute abnormality.  Calcific coronary artery   disease.  The ascending aorta is ectatic measuring up to 4.1 cm in diameter. Descending thoracic aorta is normal in caliber.  The heart is mildly to   moderately enlarged.  There is no acute abnormality of the thoracic aorta.       Lungs/pleura:  There is a small area of confluent airspace disease in the   posterior segment of the right upper lobe marginated posteriorly by the major   fissure.  There is mild pulmonary interstitial edema. Haroldo Fret is trace   bilateral pleural fluid.  No pneumothorax.       Upper Abdomen: Limited images of the upper abdomen are unremarkable.       Soft Tissues/Bones: There is a displaced overlapping fracture of the mid body   of the sternum.  There is callus formation suggesting a subacute age.           Impression   No evidence of an acute pulmonary embolus.  Study was mildly motion degraded.       Airspace disease in the right upper lobe posterior segment consistent with   pneumonia.       There is suspicion of superimposed mild interstitial edema with trace   bilateral pleural fluid and mild to moderate cardiomegaly.       Subacute displaced overlapping fracture of the mid body of the sternum. Clinical correlation recommended. Problem List:     Acute hypoxic respiratory failure  Right upper lobe pneumonia  Aspiration pneumonia  Hyponatremia, possibly from volume depletion    Assessment/Plan:     Reviewed patient's CT imaging which is consistent with right upper lobe pneumonia, possibly aspiration related. Continue current antibiotic therapy. Hyponatremia, urine sodium <20. Possibly represents volume depletion rather than SIADH. Sodium 122, currently closely monitored by nephrology. Dysphagia 3 diet with nectar thick liquids. Pulmonary will follow.     Fransico Pinzon MD

## 2019-05-06 NOTE — CARE COORDINATION
[address] Transitions Interview     2019    Patient: Jaqui Garcia Patient : 1925   MRN: [id number]  Reason for Admission: SOB  RARS: Readmission Risk Score: 14    Staff and visitors in room at this time, will attempt to revisit      Follow Up  No future appointments. Health Maintenance  There are no preventive care reminders to display for this patient.     Ronda Whipple RN

## 2019-05-06 NOTE — PROGRESS NOTES
MD asked to be notified of sodium level, called MD and message left for return call regarding sodium level 121 from blood draw at 0200 5/6/19, all vitals stable with no mental changes, continuing to check BMP every 4 hours, awaiting response back from MD

## 2019-05-06 NOTE — PROGRESS NOTES
Nephrology Note        Chief Complaint:    Chief Complaint   Patient presents with    Shortness of Breath     does not wear O2, states no HX with O2 problems, 81% on RA at SNF, is on a nonrebreather         History of Present iIlness:    Mary Thorne is a 80 y.o. female with h/o a . Fib, CHF, HTN was admitted with generalized weakness. She began to feel weak and dizzy and collapsed to the floor. She denies losing consciousness. She denies hitting her head. She states \" I was just extremely short of breath\". Patient arrived by EMS and it was stated she was 81% on room air at the skilled nursing facility. Patient was placed on a nonrebreather satting 93%. Patient does not use oxygen at home.     Yesterday she has very low urine output   On 3 L oxygen per NC now     Gave her lasix 40 mg iv x 1 yesterday   Sodium level low at 123   Has poor oral intake     Interval HX     Sodium level improved from 118 ---> 122 overnight   Denies any SOB   More awake and alert   Responds to simple commands       Past Medical History:   Diagnosis Date    Atrial fibrillation (HCC)     Cancer (HCC)     breast cancer     [facility] [address]     Hyperlipidemia     Hypertension     Seasonal allergies     Thyroid disease        Past Surgical History:   Procedure Laterality Date    EYE SURGERY      HIP SURGERY Left 7-26-14    LEFT HIP HEMIARTHROPLASTY                 HYSTERECTOMY           Current Medications:      sodium chloride tablet 1 g TID    urea (URE-NA) packet 15 g BID   ferrous sulfate tablet 325 mg BID    rivaroxaban (XARELTO) tablet 15 mg Daily   aspirin chewable tablet 324 mg Once   levothyroxine (SYNTHROID) tablet 75 mcg Daily   citalopram (CELEXA) tablet 10 mg Daily   sodium chloride flush 0.9 % injection 10 mL 2 times per day   sodium chloride flush 0.9 % injection 10 mL PRN   magnesium hydroxide (MILK OF MAGNESIA) 400 MG/5ML suspension 30 mL Daily PRN   ondansetron (ZOFRAN) injection 4 mg the last 72 hours. UA:No results for input(s): Veronica Miller, GLUCOSEU, BILIRUBINUR, Iza Stabs, BLOODU, PHUR, PROTEINU, UROBILINOGEN, NITRU, LEUKOCYTESUR, LABMICR, URINETYPE in the last 72 hours. Urine Microscopic: No results for input(s): LABCAST, BACTERIA, COMU, HYALCAST, WBCUA, RBCUA, EPIU in the last 72 hours. Urine Culture: No results for input(s): LABURIN in the last 72 hours. Urine Chemistry:   Recent Labs     05/05/19  0422   LABCREA 233.2   NAUR <20                IMAGING:  CT Chest Pulmonary Embolism W Contrast   Final Result   No evidence of an acute pulmonary embolus. Study was mildly motion degraded. Airspace disease in the right upper lobe posterior segment consistent with   pneumonia. There is suspicion of superimposed mild interstitial edema with trace   bilateral pleural fluid and mild to moderate cardiomegaly. Subacute displaced overlapping fracture of the mid body of the sternum. Clinical correlation recommended. CT HEAD WO CONTRAST   Final Result   1. No acute intracranial abnormality. 2. Age-appropriate atrophy with chronic small vessel ischemic white matter   disease. 3. Paranasal sinus disease, as detailed above. XR CHEST STANDARD (2 VW)   Final Result   No acute cardiopulmonary process. COPD. I/O last 3 completed shifts: In: 840 [P.O.:840]  Out: 1500 [Urine:1000; Drains:500]          Assessment/Plan :      1. Acute hyponatremia. Most likely low solute intake vs. SIADH  Poor oral intake  Check   Urine sodium  Urine osmolality    started sodium chloride tablets   On urea   Give lasix 20 mg iv x 1 today   Goal of correction is 10-12 Meq / 24 hrs  Monitor urine output  Monitor neurological status. If any change  please notify me. 2. Anuria. UO started improving today morning   UO improved overnight, got lasix 40 mg iv last evening     3. Anemia. Has iron deficiency anemia.  Will give iv venofer     4. Acid- base disorder.  Improving     D/w Dr. Breanna Vega in 4 hrs                  Thank you for allowing us to participate in care of Delfin Ridchristianne         Electronically signed by: Yasmine Gardner MD, 5/6/2019, [address] [place] [address]      [facility] of [address] [place] [address]  Office : [phone]  Fax :[phone]

## 2019-05-06 NOTE — CARE COORDINATION
Discharge Planning Assessment  SW spoke with pt's son discuss reason for admission, current living situation, and potential needs at the time of discharge. Demographics/Insurance verified Yes/Yes    Current type of dwelling: Lives in [place] [address]     Patient from ECF/SW confirmed with: n/a    Living arrangements: [facility]    Level of function/Support: Depends on assisted living staff. PCP: Dr. Sheri Maurer    Last Visit to PCP: 10/18/18    DME: gale Young    Active with any community resources/agencies/skilled home care: no    Medication compliance issues: no    Financial issues that could impact healthcare: no    Transportation at the time of discharge: Will need transport    Tentative discharge plan:  SW spoke with son regarding discharge planning. Son would prefer for pt to return to [place] vs going to SNF if recommending. States he feels they could better meet her needs there and is concerned that he cognition would perpetuate in new environment. He states her current confusion is not her baseline. SW to plan discharge with son, closer to discharge date. PT/OT ordered.     Geremias Mcrae MSW, [address]

## 2019-05-06 NOTE — PROGRESS NOTES
Hospital Medicine Progress Note     Date:  5/6/2019    PCP: Theresa Clarke MD (Tel: [phone])    Date of Admission: 5/3/2019      Brief hospital course: Pt presented to the ER after feeling weak and dizzy while she was on the way to the bathroom and ended collapsing to the floor, denies LOC, palpitations, head trauma and states she fell because she was SOB and weak. Subjective  Pt sitting up in bed w/ friend at bedside, friend states pt is not at her baseline and more confused than usual.    Objective  Physical exam:  Vitals: BP (!) 148/63   Pulse 81   Temp 98.8 °F (37.1 °C) (Temporal)   Resp 20   Ht 5' 2\" (1.575 m)   Wt 110 lb (49.9 kg)   SpO2 97%   BMI 20.12 kg/m²   Gen: Not in distress. Alert. Head: Normocephalic. Atraumatic. Eyes: EOMI. Good acuity. ENT: Oral mucosa moist  Neck: No JVD. No obvious thyromegaly. CVS: Nml S1S2, no MRG, RRR  Pulmomary: Clear bilaterally. No crackles. No wheezes. Gastrointestinal: Soft, NT/ND. Positive bowel sounds. Musculoskeletal: No edema. Warm  Neuro: No focal deficit. Moves extremity spontaneously. Psychiatry: Appropriate affect. Not agitated. Skin: Warm, dry with normal turgor. No rash      24HR INTAKE/OUTPUT:      Intake/Output Summary (Last 24 hours) at 5/6/2019 1641  Last data filed at 5/6/2019 1600  Gross per 24 hour   Intake 720 ml   Output 1750 ml   Net -1030 ml     I/O last 3 completed shifts:   In: 12 [P.O.:960]  Out: 1100 [Urine:1000; Drains:100]  I/O this shift:  In: -   Out: 650 [Drains:650]      Meds:    iron sucrose  100 mg Intravenous Q24H    potassium bicarb-citric acid  20 mEq Oral Daily    levofloxacin  750 mg Oral Every Other Day    lactobacillus  1 capsule Oral BID     sodium chloride  1 g Oral TID WC    sodium chloride  1 g Oral Once    urea  15 g Oral BID    ferrous sulfate  325 mg Oral BID WC    rivaroxaban  15 mg Oral Daily    aspirin  324 mg Oral Once    levothyroxine  75 mcg Oral Daily    citalopram 10 mg Oral Daily    sodium chloride flush  10 mL Intravenous 2 times per day    cetirizine  5 mg Oral Nightly    albuterol  2.5 mg Nebulization Q4H WA       Infusions:       PRN Meds: sodium chloride flush, magnesium hydroxide, ondansetron, potassium chloride, magnesium sulfate, acetaminophen    Labs/imaging:  CBC:   Recent Labs     05/04/19  0514   WBC 8.4   HGB 9.5*            BMP:    Recent Labs     05/06/19  0552 05/06/19  0953 05/06/19  1419   * 125* 121*   K 3.4* 3.4* 3.3*   CL 81* 82* 80*   CO2 28 28 28   BUN 25* 23* 37*   CREATININE 0.6 0.6 0.5*   GLUCOSE 120* 138* 141*         Hepatic: No results for input(s): AST, ALT, ALB, BILITOT, ALKPHOS in the last 72 hours. Troponin:   Recent Labs     05/03/19  2114 05/04/19  0114   TROPONINI 0.04* 0.05*         BNP: No results for input(s): BNP in the last 72 hours. INR: No results for input(s): INR in the last 72 hours. Reviewed imaging and reports noted      Assessment:  Active Problems:    A-fib (HCC)    HTN (hypertension)    CHF (congestive heart failure), NYHA class I, acute on chronic, combined (HCC)    Hyponatremia    Elevated troponin    Community acquired bacterial pneumonia    Hypoxemia    Congestive heart failure (HCC)    Hypoxia    Pneumonia of right upper lobe due to infectious organism [facility])    Acute respiratory failure with hypoxia ([facility] [address]    Fall    TRENT (acute kidney injury) ([facility] [address]    Metabolic alkalosis  Resolved Problems:    * No resolved hospital problems.  *        Plan:  Hyponatremia  - Nephro following  - cont urea and sodium chloride tabs, improving  - Goal is to increase 10 - 12 mEq/24hrs      CAP - RUL  - Pulm and ID following, abx per ID  - add mucinex, cont IS      Acute Resp Failure w/ Hypoxia  - 2/2 above, repeat CXR tomm  - Pulm following, maintain O2 sats > 88%      Acute Metabolic Encephalopathy  - 2/2 above, improving, cont to treat above      Acute CHF (unclear is it is diastolic or systolic)  - improved, ECHO pending  - cardio evaluated and s/o  - has received IV lasix       PAF  - on xarelto for AC      KEEGAN  - cont venofer, hold ferrous sulfate      Hypothyroidism  - cont synthroid      Diet: DIET CARDIAC; Dysphagia III Advanced;  Nectar Thick; Daily Fluid Restriction: 1800 ml    Activity: up with assist  Prophylaxis: xarelto    Code status: DNR-CC     ----------        Priscilla Prater MD  -------------------------------  Rounding hospitalist

## 2019-05-06 NOTE — PROGRESS NOTES
Speech Language Pathology  Dysphagia Treatment Note    Name:  Gianni Nieves  :   1925  Medical Diagnosis:  CHF (congestive heart failure), NYHA class I, acute on chronic, combined ([facility] [I50.43]  CHF (congestive heart failure), NYHA class I, acute on chronic, combined ([address] [I50.43]  Treatment Diagnosis: Oropharyngeal Dysphagia  Pain level:  Pt did not report pain    Current Diet Level:   1) Dysphagia III Advanced with NECTAR thick liquids, meds with puree or 1 at a time with nectar thick liquids  2) Allow thin water in isolation between meals s/p oral care     Tolerance of Current Diet Level: Per chart review, no noted difficulty with current diet    Assessment of Texture Tolerance:  -Impressions: Pt was seen sitting upright in bed. Pt was noted to have pulled out IV, RN notified immediately. Trials of thin liquids, nectar thick liquids, and soft solids were provided. Thin liquids via cup revealed use of successive sips, reduced bolus control with premature bolus loss to pharynx. Delayed swallow initiation was noted with thin liquids with delayed coughing assessed. Improved bolus control was noted with nectar thick liquids, delayed cough was noted x1. Prolonged mastication was noted with soft solids, Pt with perseverative chewing noted with mild stasis on lingual surface. At this time, continuation of current diet is recommended with ongoing use of aspiration precautions. Diet and Treatment Recommendations:  Continue current diet    Dysphagia Goals:  1.) Pt will tolerate recommended diet without s/s of aspiration (ongoing 2019)   2.) Pt will tolerate MBS to r/o aspiration and determine appropriate diet/liquid level. (ongoing 2019)   3.) Pt will tolerate diet advance to least restricted diet, as clinically indicated, with no signs of aspiration  (ongoing 2019)     Plan:  Continued daily Dysphagia treatment with goals per plan of care.     Patient/Family Education:Education given to the Pt and nurse, who verbalized understanding    Discharge Recommendations:  Pt will benefit from continued skilled Speech Therapy for Dysphagia services, prior to returning home.     Timed Code Treatment: 0 minutes    Total Treatment Time: 15 minutes    Leodan Sarabia M.A., [address] [place] [address]  Speech-Language Pathologist

## 2019-05-06 NOTE — PROGRESS NOTES
MD return call regarding pt sodium level 121 with order to monitor next sodium level due to be draw at 0600 5/6/19 and to call with results

## 2019-05-06 NOTE — PROGRESS NOTES
Infectious Diseases   Progress Note      Admission Date: 5/3/2019  Hospital Day: Hospital Day: 4  Attending: Ansley Ramesh MD  Date of service: 5/6/2019    Presenting complaint:   Chief Complaint   Patient presents with    Shortness of Breath     does not wear O2, states no HX with O2 problems, 81% on RA at SNF, is on a nonrebreather       Chief complaint/ Reason for consult: The patient was seen today for the following:    · Right upper lobe pneumonia  · Hyponatremia   · Acute kidney injury  · Acute respiratory failure with hypoxia  · Hypochloremic metabolic alkalosis    Microbiology:        I have reviewed all available micro lab data and cultures    · Blood culture (2/2) - collected on 5/3/2019: Negative so far        Problem list:       Patient Active Problem List   Diagnosis Code    Other specified acquired hypothyroidism E03.8    [facility]fib ([facility] I48.91    HTN (hypertension) I10    Hip fracture, left ([facility] S72.002A    CHF (congestive heart failure), NYHA class I, acute on chronic, combined ([facility] I50.43    Hyponatremia E87.1    Elevated troponin R74.8    Community acquired bacterial pneumonia J15.9    Hypoxemia R09.02    Congestive heart failure ([address] I50.9    Hypoxia R09.02    Pneumonia due to organism J18.9    Acute respiratory failure with hypoxia ([facility] J96.01         Assessment:     The patient is a 80 y.o. old female who  has a past medical history of Atrial fibrillation ([facility] Cancer ([facility] CHF (congestive heart failure) ([facility] Hyperlipidemia, Hypertension, Seasonal allergies, and Thyroid disease. with following problems:    · Right upper lobe pneumonia  · Hyponatremia   · Acute kidney injury  · Acute respiratory failure with hypoxia  · Hypochloremic metabolic alkalosis  · Congestive heart failure  · Essential hypertension  · Mixed hyperlipidemia  · Chronic atrial fibrillation      Discussion:      The patient has been on IV ceftriaxone and azithromycin.   Blood cultures from admission are negative so far. Urine Legionella and pneumococcal antigens are negative. Respiratory film viral PCR panel was negative. Serum creatinine 0.6. She is quite encephalopathic. Unclear if this is due to hyponatremia    Plan:     Diagnostic Workup:    · Continue to follow  fever curve, WBC count and blood cultures  · Follow up on liver and renal function  · Will order pro-calcitonin level    Antimicrobials:    · Will stop IV ceftriaxone daily  · Will stop IV azithromycin today  · Will order by mouth Levaquin 750 mg every other day  · Start Probiotic twice daily  · Recommended 3 more doses of Levaquin  · Cough and deep breathing exercises  · Maintain good glycemic control  · DVT prophylaxis  · Discussed the above plan with RN    Drug Monitoring:    · Continue monitoring for antibiotic toxicity as follows: CMP, QTc interval  · Continue to watch for following: new or worsening fever, new hypotension, hives, lip swelling and redness or purulence at vascular access sites. I/v access Management:    · Continue to monitor i.v access sites for erythema, induration,discharge or tenderness. · As always, continue efforts to minimize tubes/ lines/ drains as clinically appropriate to reduce chances of line associated infections. TIME SPENT TODAY:     - Spent over  36  minutes on visit (including interval history, physical exam, review of data including labs, cultures, imaging, development and implementation of treatment plan and coordination of complex care). - Over 50% of time spent with patient face to face on counseling and education. Thank you for involving me in the care of your patient. I will continue to follow. If you have any additional questions, please do not hesitate to contact me. Subjective: Interval history: Patient was seen and examined at bedside. Interval history was obtained. The patient is awake but encephalopathic. He seems to be tolerating antibiotics okay. No diarrhea. REVIEW OF SYSTEMS:      Review of Systems   Unable to perform ROS: Mental status change         Past Medical History: All past medical history reviewed today. Past Medical History:   Diagnosis Date    Atrial fibrillation ([facility]     Cancer ([facility]     breast cancer     CHF (congestive heart failure) (HCC)     Hyperlipidemia     Hypertension     Seasonal allergies     Thyroid disease        Past Surgical History: All past surgical history was reviewed today. Past Surgical History:   Procedure Laterality Date    EYE SURGERY      HIP SURGERY Left 7-26-14    LEFT HIP HEMIARTHROPLASTY                 HYSTERECTOMY           Immunization History: All immunization history was reviewed by me today. Immunization History   Administered Date(s) Administered    Influenza Virus Vaccine 10/24/2011, 10/22/2012, 10/21/2013    Influenza, High Dose (Fluzone 65 yrs and older) 09/30/2014, 10/12/2015, 10/11/2016, 10/03/2017, 10/18/2018    Pneumococcal 13-valent Conjugate ([id number]) 10/12/2015    Pneumococcal Polysaccharide ([id number]) 02/07/2017       Family History: All family history was reviewed today. History reviewed. No pertinent family history.     Objective:       PHYSICAL EXAM:      Vitals:   Vitals:    05/06/19 0645 05/06/19 0722 05/06/19 0900 05/06/19 1119   BP:   (!) 149/65    Pulse:   73    Resp:  22 20 18   Temp:   98.4 °F (36.9 °C)    TempSrc:   Temporal    SpO2:  94% 99% 94%   Weight: 110 lb (49.9 kg)      Height:           Physical Exam       General: Encephalopathic but protecting the airway so far, awake otherwise  HEENT: normocephalic, atraumatic, sclera clear, pupils equal, light reflex preserved bilaterally  Cardiovascular: RRR, no murmurs/rubs/gallops detected  Pulmonary: CTABL, no rhonchi/rales   Abdomen/GI: soft, no organomegaly, bowel sounds positive  Neuro: Encephalopathic, pupils are equal and reactive to light, moves all extremities  Skin: no jose g  Musculoskeletal:  No obvious joint swelling, redness. No limitation of range of passive motion  Genitourinary: Erickson's catheter in place   Psych: could not assess  Lymphatic/Immunologic: No obvious bruising, no cervical lymphadenopathy    Lines: All vascular access sites are healthy with no local erythema, discharge or tenderness    Intake and output:    I/O last 3 completed shifts: In: 840 [P.O.:840]  Out: 1500 [Urine:1000; Drains:500]    Lab Data:   All available labs and old records have been reviewed by me. CBC:  Recent Labs     05/03/19  1417 05/04/19  0514   WBC 7.9 8.4   RBC 4.14 3.94*   HGB 10.1* 9.5*   HCT 31.7* 29.8*    257   MCV 76.6* 75.7*   MCH 24.3* 24.3*   MCHC 31.8 32.0   RDW 17.7* 17.6*        BMP:  Recent Labs     05/06/19  0204 05/06/19  0552 05/06/19  0953   * 122* 125*   K 3.4* 3.4* 3.4*   CL 82* 81* 82*   CO2 28 28 28   BUN 28* 25* 23*   CREATININE 0.6 0.6 0.6   CALCIUM 8.3 8.6 8.5   GLUCOSE 132* 120* 138*        Hepatic Function Panel:   Lab Results   Component Value Date    ALKPHOS 120 05/03/2019    ALT 18 05/03/2019    AST 32 05/03/2019    PROT 6.9 05/03/2019    PROT 6.8 10/22/2012    BILITOT 0.9 05/03/2019    LABALBU 3.6 05/03/2019       CPK: No results found for: CKTOTAL  ESR: No results found for: SEDRATE  CRP: No results found for: CRP        Imaging: All pertinent images and reports for the current visit were reviewed by me during this visit. CT Chest Pulmonary Embolism W Contrast   Final Result   No evidence of an acute pulmonary embolus. Study was mildly motion degraded. Airspace disease in the right upper lobe posterior segment consistent with   pneumonia. There is suspicion of superimposed mild interstitial edema with trace   bilateral pleural fluid and mild to moderate cardiomegaly. Subacute displaced overlapping fracture of the mid body of the sternum. Clinical correlation recommended. CT HEAD WO CONTRAST   Final Result   1. No acute intracranial abnormality.    2. Age-appropriate atrophy with chronic small vessel ischemic white matter   disease. 3. Paranasal sinus disease, as detailed above. XR CHEST STANDARD (2 VW)   Final Result   No acute cardiopulmonary process. COPD. Medications: All current and past medications were reviewed.  iron sucrose  100 mg Intravenous Q24H    sodium chloride  1 g Oral BID WC    potassium bicarb-citric acid  20 mEq Oral Daily    urea  15 g Oral BID    ferrous sulfate  325 mg Oral BID WC    rivaroxaban  15 mg Oral Daily    aspirin  324 mg Oral Once    levothyroxine  75 mcg Oral Daily    citalopram  10 mg Oral Daily    sodium chloride flush  10 mL Intravenous 2 times per day    cetirizine  5 mg Oral Nightly    albuterol  2.5 mg Nebulization Q4H WA    azithromycin  500 mg Intravenous Q24H    And    cefTRIAXone (ROCEPHIN) IV  1 g Intravenous Q24H           sodium chloride flush, magnesium hydroxide, ondansetron, potassium chloride, magnesium sulfate, acetaminophen    Current antibiotics: All antibiotics and their doses were reviewed by me today    Recent Abx Admin                   cefTRIAXone (ROCEPHIN) 1 g in sterile water 10 mL IV syringe (g) 1 g Given 05/06/19 0928    azithromycin (ZITHROMAX) 500 mg in D5W 250ml Vial Mate (mg) 500 mg New Bag 05/06/19 0926                Known drug Allergies: All allergies were reviewed and updated    No Known Allergies        Please note that this chart was generated using Dragon dictation software. Although every effort was made to ensure the accuracy of this automated transcription, some errors in transcription may have occurred inadvertently. If you may need any clarification, please do not hesitate to contact me through EPIC or at the phone number provided below with my electronic signature.     Sanchez Junior MD, MPH  5/6/2019, 12:11 PM  [facility] Infectious Disease   Office: [phone]  Fax: [phone]  Tuesday AM clinic:   [address] [place] [address], [place] [address]  Thursday AM [id number] [address]

## 2019-05-07 ENCOUNTER — APPOINTMENT (OUTPATIENT)
Dept: GENERAL RADIOLOGY | Age: 84
DRG: 291 | End: 2019-05-07
Payer: MEDICARE

## 2019-05-07 LAB
ANION GAP SERPL CALCULATED.3IONS-SCNC: 10 MMOL/L (ref 3–16)
ANION GAP SERPL CALCULATED.3IONS-SCNC: 11 MMOL/L (ref 3–16)
ANION GAP SERPL CALCULATED.3IONS-SCNC: 12 MMOL/L (ref 3–16)
BUN BLDV-MCNC: 24 MG/DL (ref 7–20)
BUN BLDV-MCNC: 25 MG/DL (ref 7–20)
BUN BLDV-MCNC: 30 MG/DL (ref 7–20)
BUN BLDV-MCNC: 33 MG/DL (ref 7–20)
BUN BLDV-MCNC: 36 MG/DL (ref 7–20)
CALCIUM SERPL-MCNC: 8.7 MG/DL (ref 8.3–10.6)
CALCIUM SERPL-MCNC: 8.7 MG/DL (ref 8.3–10.6)
CALCIUM SERPL-MCNC: 8.8 MG/DL (ref 8.3–10.6)
CHLORIDE BLD-SCNC: 83 MMOL/L (ref 99–110)
CHLORIDE BLD-SCNC: 83 MMOL/L (ref 99–110)
CHLORIDE BLD-SCNC: 84 MMOL/L (ref 99–110)
CHLORIDE BLD-SCNC: 84 MMOL/L (ref 99–110)
CHLORIDE BLD-SCNC: 86 MMOL/L (ref 99–110)
CO2: 29 MMOL/L (ref 21–32)
CO2: 29 MMOL/L (ref 21–32)
CO2: 30 MMOL/L (ref 21–32)
CO2: 31 MMOL/L (ref 21–32)
CO2: 32 MMOL/L (ref 21–32)
CREAT SERPL-MCNC: 0.5 MG/DL (ref 0.6–1.2)
CREAT SERPL-MCNC: 0.5 MG/DL (ref 0.6–1.2)
CREAT SERPL-MCNC: 0.6 MG/DL (ref 0.6–1.2)
FOLATE: 8.73 NG/ML (ref 4.78–24.2)
GFR AFRICAN AMERICAN: >60
GFR NON-AFRICAN AMERICAN: >60
GLUCOSE BLD-MCNC: 105 MG/DL (ref 70–99)
GLUCOSE BLD-MCNC: 105 MG/DL (ref 70–99)
GLUCOSE BLD-MCNC: 108 MG/DL (ref 70–99)
GLUCOSE BLD-MCNC: 121 MG/DL (ref 70–99)
GLUCOSE BLD-MCNC: 165 MG/DL (ref 70–99)
HCT VFR BLD CALC: 26.7 % (ref 36–48)
HEMOGLOBIN: 8.6 G/DL (ref 12–16)
LV EF: 55 %
LVEF MODALITY: NORMAL
MAGNESIUM: 1.9 MG/DL (ref 1.8–2.4)
MCH RBC QN AUTO: 23.6 PG (ref 26–34)
MCHC RBC AUTO-ENTMCNC: 32 G/DL (ref 31–36)
MCV RBC AUTO: 73.8 FL (ref 80–100)
PDW BLD-RTO: 17.9 % (ref 12.4–15.4)
PLATELET # BLD: 241 K/UL (ref 135–450)
PMV BLD AUTO: 7.4 FL (ref 5–10.5)
POTASSIUM SERPL-SCNC: 3.4 MMOL/L (ref 3.5–5.1)
POTASSIUM SERPL-SCNC: 3.5 MMOL/L (ref 3.5–5.1)
POTASSIUM SERPL-SCNC: 3.6 MMOL/L (ref 3.5–5.1)
POTASSIUM SERPL-SCNC: 3.6 MMOL/L (ref 3.5–5.1)
POTASSIUM SERPL-SCNC: 3.7 MMOL/L (ref 3.5–5.1)
PRO-BNP: 4335 PG/ML (ref 0–449)
RBC # BLD: 3.62 M/UL (ref 4–5.2)
SODIUM BLD-SCNC: 124 MMOL/L (ref 136–145)
SODIUM BLD-SCNC: 124 MMOL/L (ref 136–145)
SODIUM BLD-SCNC: 126 MMOL/L (ref 136–145)
VITAMIN B-12: 1422 PG/ML (ref 211–911)
WBC # BLD: 8.1 K/UL (ref 4–11)

## 2019-05-07 PROCEDURE — 97127 HC SP THER IVNTJ W/FOCUS COG FUNCJ: CPT

## 2019-05-07 PROCEDURE — 82607 VITAMIN B-12: CPT

## 2019-05-07 PROCEDURE — 94668 MNPJ CHEST WALL SBSQ: CPT

## 2019-05-07 PROCEDURE — 6370000000 HC RX 637 (ALT 250 FOR IP): Performed by: NURSE PRACTITIONER

## 2019-05-07 PROCEDURE — 92526 ORAL FUNCTION THERAPY: CPT

## 2019-05-07 PROCEDURE — 6370000000 HC RX 637 (ALT 250 FOR IP): Performed by: FAMILY MEDICINE

## 2019-05-07 PROCEDURE — 1200000000 HC SEMI PRIVATE

## 2019-05-07 PROCEDURE — 94640 AIRWAY INHALATION TREATMENT: CPT

## 2019-05-07 PROCEDURE — 71045 X-RAY EXAM CHEST 1 VIEW: CPT

## 2019-05-07 PROCEDURE — 36415 COLL VENOUS BLD VENIPUNCTURE: CPT

## 2019-05-07 PROCEDURE — 85027 COMPLETE CBC AUTOMATED: CPT

## 2019-05-07 PROCEDURE — 82746 ASSAY OF FOLIC ACID SERUM: CPT

## 2019-05-07 PROCEDURE — 97162 PT EVAL MOD COMPLEX 30 MIN: CPT

## 2019-05-07 PROCEDURE — 83735 ASSAY OF MAGNESIUM: CPT

## 2019-05-07 PROCEDURE — 83880 ASSAY OF NATRIURETIC PEPTIDE: CPT

## 2019-05-07 PROCEDURE — 6360000002 HC RX W HCPCS: Performed by: NURSE PRACTITIONER

## 2019-05-07 PROCEDURE — 80048 BASIC METABOLIC PNL TOTAL CA: CPT

## 2019-05-07 PROCEDURE — 99232 SBSQ HOSP IP/OBS MODERATE 35: CPT | Performed by: INTERNAL MEDICINE

## 2019-05-07 PROCEDURE — 6370000000 HC RX 637 (ALT 250 FOR IP): Performed by: INTERNAL MEDICINE

## 2019-05-07 PROCEDURE — 97165 OT EVAL LOW COMPLEX 30 MIN: CPT

## 2019-05-07 PROCEDURE — 2580000003 HC RX 258: Performed by: INTERNAL MEDICINE

## 2019-05-07 PROCEDURE — 94760 N-INVAS EAR/PLS OXIMETRY 1: CPT

## 2019-05-07 PROCEDURE — 6360000002 HC RX W HCPCS: Performed by: INTERNAL MEDICINE

## 2019-05-07 PROCEDURE — 2700000000 HC OXYGEN THERAPY PER DAY

## 2019-05-07 PROCEDURE — 2580000003 HC RX 258: Performed by: NURSE PRACTITIONER

## 2019-05-07 PROCEDURE — 97530 THERAPEUTIC ACTIVITIES: CPT

## 2019-05-07 PROCEDURE — 93306 TTE W/DOPPLER COMPLETE: CPT

## 2019-05-07 PROCEDURE — 97535 SELF CARE MNGMENT TRAINING: CPT

## 2019-05-07 RX ORDER — PANTOPRAZOLE SODIUM 40 MG/1
40 TABLET, DELAYED RELEASE ORAL
Status: DISCONTINUED | OUTPATIENT
Start: 2019-05-08 | End: 2019-05-11 | Stop reason: HOSPADM

## 2019-05-07 RX ORDER — GUAIFENESIN 600 MG/1
600 TABLET, EXTENDED RELEASE ORAL 2 TIMES DAILY
Status: DISCONTINUED | OUTPATIENT
Start: 2019-05-07 | End: 2019-05-09 | Stop reason: ALTCHOICE

## 2019-05-07 RX ADMIN — CETIRIZINE HYDROCHLORIDE 5 MG: 10 TABLET, FILM COATED ORAL at 19:45

## 2019-05-07 RX ADMIN — CITALOPRAM HYDROBROMIDE 10 MG: 20 TABLET ORAL at 09:05

## 2019-05-07 RX ADMIN — FERROUS SULFATE TAB 325 MG (65 MG ELEMENTAL FE) 325 MG: 325 (65 FE) TAB at 09:06

## 2019-05-07 RX ADMIN — GUAIFENESIN 600 MG: 600 TABLET, EXTENDED RELEASE ORAL at 19:44

## 2019-05-07 RX ADMIN — SODIUM CHLORIDE TAB 1 GM 1 G: 1 TAB at 11:48

## 2019-05-07 RX ADMIN — Medication 10 ML: at 09:07

## 2019-05-07 RX ADMIN — ALBUTEROL SULFATE 2.5 MG: 2.5 SOLUTION RESPIRATORY (INHALATION) at 12:39

## 2019-05-07 RX ADMIN — Medication 1 CAPSULE: at 09:05

## 2019-05-07 RX ADMIN — SODIUM CHLORIDE TAB 1 GM 1 G: 1 TAB at 17:02

## 2019-05-07 RX ADMIN — ALBUTEROL SULFATE 2.5 MG: 2.5 SOLUTION RESPIRATORY (INHALATION) at 20:05

## 2019-05-07 RX ADMIN — GUAIFENESIN 600 MG: 600 TABLET, EXTENDED RELEASE ORAL at 13:28

## 2019-05-07 RX ADMIN — Medication 15 G: at 19:45

## 2019-05-07 RX ADMIN — RIVAROXABAN 15 MG: 15 TABLET, FILM COATED ORAL at 09:05

## 2019-05-07 RX ADMIN — ALBUTEROL SULFATE 2.5 MG: 2.5 SOLUTION RESPIRATORY (INHALATION) at 16:45

## 2019-05-07 RX ADMIN — Medication 1 CAPSULE: at 17:02

## 2019-05-07 RX ADMIN — LEVOTHYROXINE SODIUM 75 MCG: 75 TABLET ORAL at 05:19

## 2019-05-07 RX ADMIN — ALBUTEROL SULFATE 2.5 MG: 2.5 SOLUTION RESPIRATORY (INHALATION) at 09:05

## 2019-05-07 RX ADMIN — SODIUM CHLORIDE TAB 1 GM 1 G: 1 TAB at 09:05

## 2019-05-07 RX ADMIN — Medication 15 G: at 11:30

## 2019-05-07 RX ADMIN — Medication 10 ML: at 19:51

## 2019-05-07 RX ADMIN — IRON SUCROSE 100 MG: 20 INJECTION, SOLUTION INTRAVENOUS at 11:29

## 2019-05-07 ASSESSMENT — PAIN SCALES - GENERAL
PAINLEVEL_OUTOF10: 0

## 2019-05-07 NOTE — PROGRESS NOTES
Physical Therapy    Facility/Department: [facility] 3A NURSING  Initial Assessment    NAME: Aston Joiner  : 1925  MRN: [id number]    Date of Service: 2019    Discharge Recommendations:  Aston Joiner scored a  on the AM-PAC short mobility form. Current research shows that an AM-PAC score of 17 or less is typically not associated with a discharge to the patient's home setting. Based on the patients AM-PAC score and their current functional mobility deficits, it is recommended that the patient have 3-5 sessions per week of Physical Therapy at d/c to increase the patients independence. If AL facility is able to provide increased services and physical assistance for transfers then possibly could discharge back to AL with PT/OT. PT Equipment Recommendations  Equipment Needed: No    Assessment   Body structures, Functions, Activity limitations: Decreased functional mobility ; Decreased safe awareness;Decreased balance;Decreased ADL status; Decreased cognition;Decreased endurance;Decreased strength  Assessment: Pt presenting with the above deficits and is currently functioning below her baseline status. Would benefit from skilled PT services to promote return to PLOF. Per CM note, son preferring pt to return to AL. AL would need to be able to provide more supervision and phsyical assistance for mobility based on today's evaluation for that to be an option. Prognosis: Good  Decision Making: Medium Complexity  Patient Education: POC, hand placement for transfers   Barriers to Learning: cognition   REQUIRES PT FOLLOW UP: Yes  Activity Tolerance  Activity Tolerance: Patient limited by cognitive status; Patient limited by endurance       Patient Diagnosis(es): The primary encounter diagnosis was Congestive heart failure, unspecified HF chronicity, unspecified heart failure type ([facility]  Diagnoses of Pneumonia due to organism, Hypoxia, Fall, initial encounter, and Elevated troponin were also pertinent to this visit. has a past medical history of Atrial fibrillation ([address] Cancer ([address] CHF (congestive heart failure) ([address] Hyperlipidemia, Hypertension, Seasonal allergies, and Thyroid disease. has a past surgical history that includes eye surgery; Hysterectomy; and hip surgery (Left, 7-26-14). Restrictions  Restrictions/Precautions  Restrictions/Precautions: Fall Risk  Position Activity Restriction  Other position/activity restrictions: Vic Hanson is a 80 y.o. female who presents to the ED for shortness of breath. Patient reports she was at home and was walking to the bathroom and became weak and dizzy and decided to return but became too weak and collapsed to the ground. She denies head injury or loss of consciousness. She feels very short of breath. She denies any chest pain or fevers. Vision/Hearing  Vision: Impaired  Vision Exceptions: Wears glasses for reading  Hearing: Exceptions to [facility]  Hearing Exceptions: Bilateral hearing aid     Subjective  General  Chart Reviewed: Yes  Family / Caregiver Present: No  Diagnosis: CHF  General Comment  Comments: supine in bed at arrival, on 3L O2; PT spoke with pt's son over phone who clarified some information   Subjective  Subjective: agreed to evaluation, poor historian and difficult to understand  Pain Screening  Patient Currently in Pain: Denies  Vital Signs  Patient Currently in Pain: Denies       Orientation  Orientation  Overall Orientation Status: Impaired  Orientation Level: Oriented to person;Disoriented to place; Disoriented to time;Disoriented to situation  Social/Functional History  Social/Functional History  Lives With: Alone  Type of Home: Assisted living  Home Access: Level entry  Home Equipment: Rolling walker, Cane  ADL Assistance: Needs assistance(assistance with bathing, independent with dressing)  Homemaking Responsibilities: No  Ambulation Assistance: Independent(uses RW for ambulation)  Transfer Assistance: Independent  Additional Comments: Pt difficult to understand when answering questions. Poor historian. Son verified that pt just recently moved to [address] [facility] (Feb 2019) from her private residence. Was independent with mobility at that time. Now using RW and walking short distances within AL. Believes facility helps with bathing/dressing. Son also believes facility can provide increased assistance if needed. Reports that her confusion is not her baseline. Cognition        Objective          AROM RLE (degrees)  RLE AROM: WFL  AROM LLE (degrees)  LLE AROM : WFL  Strength RLE  Comment: unable to follow commands for formal MMT, demonstrated anti-gravity movement  Strength LLE  Comment: unable to follow commands for formal MMT, demonstrated anti-gravity movement     Sensation  Overall Sensation Status: WFL  Bed mobility  Rolling to Right: Moderate assistance(use of bed railing)  Supine to Sit: Moderate assistance  Scooting: Moderate assistance  Comment: Pt required mod/max A for sitting balance at EOB due to confusion and attempting to return supine multiple times despite vc/tc for maintaining sitting EOB. Once scooted fwd and feet touching ground balance slightly improved but still resisting. Transfers  Sit to Stand: Moderate Assistance(Following transfer and ADL multiple stands performed ranging from CGA to mod A)  Stand to sit: Moderate Assistance  Squat Pivot Transfers: Dependent/Total(of 1, pt pushing posteriorly and not WB through LEs during transfer; limited by confusion )  Comment: VC for hand placement during STS from tall back chair. Several resulted in partial stands only. Full stands limited to only a few seconds as pt would quickly return to a seated position despite cues. Standing with flexed posture. Pt reporting some left hip pain while standing.    Ambulation  Ambulation?: No(unsafe to attempt at this time)     Balance  Posture: Poor  Sitting - Static: Poor(unsupported at EOB)  Sitting - Dynamic: Poor(unsupported at

## 2019-05-07 NOTE — PROGRESS NOTES
MD notified about new sodium results at 121 from 1000 pm blood draw, new orders to keep pt NPO until morning and resume diet

## 2019-05-07 NOTE — PROGRESS NOTES
Infectious Diseases   Progress Note      Admission Date: 5/3/2019  Hospital Day: Hospital Day: 5  Attending: Parker Sims MD  Date of service: 5/7/2019    Presenting complaint:   Chief Complaint   Patient presents with    Shortness of Breath     does not wear O2, states no HX with O2 problems, 81% on RA at SNF, is on a nonrebreather       Chief complaint/ Reason for consult: The patient was seen today for the following:    · Right upper lobe pneumonia  · Hyponatremia   · Acute kidney injury  · Acute respiratory failure with hypoxia  · Hypochloremic metabolic alkalosis    Microbiology:        I have reviewed all available micro lab data and cultures    · Blood culture (2/2) - collected on 5/3/2019: Negative so far        Problem list:       Patient Active Problem List   Diagnosis Code    Other specified acquired hypothyroidism E03.8    A-fib ([facility] I48.91    HTN (hypertension) I10    Hip fracture, left ([address] S72.002A    CHF (congestive heart failure), NYHA class I, acute on chronic, combined ([address] I50.43    Hyponatremia E87.1    Elevated troponin R74.8    Community acquired bacterial pneumonia J15.9    Hypoxemia R09.02    Congestive heart failure ([address] I50.9    Hypoxia R09.02    Pneumonia of right upper lobe due to infectious organism ([address] J18.1    Acute respiratory failure with hypoxia ([address] J96.01    Fall W19. Baudilio Keto    TRENT (acute kidney injury) ([address] P82.9    Metabolic alkalosis U19.6         Assessment:     The patient is a 80 y.o. old female who  has a past medical history of Atrial fibrillation ([address] Cancer ([address] CHF (congestive heart failure) ([address] Hyperlipidemia, Hypertension, Seasonal allergies, and Thyroid disease.  with following problems:    · Right upper lobe pneumonia - covered with Levaquin  · Hyponatremia -serum sodium is still 126  · Acute kidney injury-resolved  · Acute respiratory failure with hypoxia-improving  · Hypochloremic metabolic alkalosis - history reviewed today. Past Medical History:   Diagnosis Date    Atrial fibrillation ([facility] [address]     Cancer ([facility]     breast cancer     CHF (congestive heart failure) (HCC)     Hyperlipidemia     Hypertension     Seasonal allergies     Thyroid disease        Past Surgical History: All past surgical history was reviewed today. Past Surgical History:   Procedure Laterality Date    EYE SURGERY      HIP SURGERY Left 7-26-14    LEFT HIP HEMIARTHROPLASTY                 HYSTERECTOMY           Immunization History: All immunization history was reviewed by me today. Immunization History   Administered Date(s) Administered    Influenza Virus Vaccine 10/24/2011, 10/22/2012, 10/21/2013    Influenza, High Dose (Fluzone 65 yrs and older) 09/30/2014, 10/12/2015, 10/11/2016, 10/03/2017, 10/18/2018    Pneumococcal 13-valent Conjugate ([id number]) 10/12/2015    Pneumococcal Polysaccharide ([id number]) 02/07/2017       Family History: All family history was reviewed today. History reviewed. No pertinent family history. Objective:       PHYSICAL EXAM:      Vitals:   Vitals:    05/07/19 0901 05/07/19 0908 05/07/19 1239 05/07/19 1326   BP: (!) 144/69   128/67   Pulse: 71   69   Resp: 18   18   Temp: 97.5 °F (36.4 °C)   98.1 °F (36.7 °C)   TempSrc: Temporal   Axillary   SpO2: 95% 95% 93% 96%   Weight:       Height:           Physical Exam       General: Encephalopathic but protecting the airway so far, she is afebrile  HEENT: normocephalic, atraumatic, sclera clear, pupils equal, light reflex preserved bilaterally  Cardiovascular: RRR, no murmurs/rubs/gallops detected  Pulmonary: CTABL, no rhonchi/rales   Abdomen/GI: soft, no organomegaly, bowel sounds positive  Neuro: Encephalopathic, pupils are equal and reactive to light, moves all extremities  Skin: no rash  Musculoskeletal:  No obvious joint swelling, redness. No limitation of range of passive motion  Genitourinary:  Erickson's catheter in place   Psych: could not assess   Lymphatic/Immunologic: No obvious bruising, no cervical lymphadenopathy    Lines: All vascular access sites are healthy with no local erythema, discharge or tenderness    Intake and output:    I/O last 3 completed shifts: In: 840 [P.O.:840]  Out: 1400 [Urine:450; Drains:950]    Lab Data:   All available labs and old records have been reviewed by me. CBC:  Recent Labs     05/07/19  0551   WBC 8.1   RBC 3.62*   HGB 8.6*   HCT 26.7*      MCV 73.8*   MCH 23.6*   MCHC 32.0   RDW 17.9*        BMP:  Recent Labs     05/07/19  0551 05/07/19  1022 05/07/19  1023   * 126* 126*   K 3.6 3.6 3.5   CL 83* 84* 84*   CO2 29 31 32   BUN 30* 24* 25*   CREATININE 0.5* 0.5* 0.6   CALCIUM 8.7 8.8 8.8   GLUCOSE 105* 105* 108*        Hepatic Function Panel:   Lab Results   Component Value Date    ALKPHOS 120 05/03/2019    ALT 18 05/03/2019    AST 32 05/03/2019    PROT 6.9 05/03/2019    PROT 6.8 10/22/2012    BILITOT 0.9 05/03/2019    LABALBU 3.6 05/03/2019       CPK: No results found for: CKTOTAL  ESR: No results found for: SEDRATE  CRP: No results found for: CRP        Imaging: All pertinent images and reports for the current visit were reviewed by me during this visit. CT Chest Pulmonary Embolism W Contrast   Final Result   No evidence of an acute pulmonary embolus. Study was mildly motion degraded. Airspace disease in the right upper lobe posterior segment consistent with   pneumonia. There is suspicion of superimposed mild interstitial edema with trace   bilateral pleural fluid and mild to moderate cardiomegaly. Subacute displaced overlapping fracture of the mid body of the sternum. Clinical correlation recommended. CT HEAD WO CONTRAST   Final Result   1. No acute intracranial abnormality. 2. Age-appropriate atrophy with chronic small vessel ischemic white matter   disease. 3. Paranasal sinus disease, as detailed above.          XR CHEST STANDARD (2 VW)   Final Result No acute cardiopulmonary process. COPD. XR CHEST PORTABLE    (Results Pending)       Medications: All current and past medications were reviewed.  [START ON 5/8/2019] pantoprazole  40 mg Oral QAM AC    guaiFENesin  600 mg Oral BID    iron sucrose  100 mg Intravenous Q24H    levofloxacin  750 mg Oral Every Other Day    lactobacillus  1 capsule Oral BID WC    sodium chloride  1 g Oral TID WC    urea  15 g Oral BID    rivaroxaban  15 mg Oral Daily    aspirin  324 mg Oral Once    levothyroxine  75 mcg Oral Daily    citalopram  10 mg Oral Daily    sodium chloride flush  10 mL Intravenous 2 times per day    cetirizine  5 mg Oral Nightly    albuterol  2.5 mg Nebulization Q4H WA           sodium chloride flush, magnesium hydroxide, ondansetron, potassium chloride, magnesium sulfate, acetaminophen    Current antibiotics: All antibiotics and their doses were reviewed by me today    Recent Abx Admin      No antibiotic orders with administrations found. Known drug Allergies: All allergies were reviewed and updated    No Known Allergies        Please note that this chart was generated using Dragon dictation software. Although every effort was made to ensure the accuracy of this automated transcription, some errors in transcription may have occurred inadvertently. If you may need any clarification, please do not hesitate to contact me through EPIC or at the phone number provided below with my electronic signature.     Liu Mendoza MD, MPH  5/7/2019, 2:12 PM  [facility] Infectious Disease   Office: [phone]  Fax: [phone]  Tuesday AM clinic:   [address] [address], [facility] [address]  Thursday AM [facility]AUJV:[id number] [facility]

## 2019-05-07 NOTE — PROGRESS NOTES
Nephrology Note        Chief Complaint:    Chief Complaint   Patient presents with    Shortness of Breath     does not wear O2, states no HX with O2 problems, 81% on RA at SNF, is on a nonrebreather         History of Present iIlness:    Didi Morales is a 80 y.o. female with h/o a . Fib, CHF, HTN was admitted with generalized weakness. She began to feel weak and dizzy and collapsed to the floor. She denies losing consciousness. She denies hitting her head. She states \" I was just extremely short of breath\". Patient arrived by EMS and it was stated she was 81% on room air at the [facility] nursing facility. Patient was placed on a nonrebreather satting 93%. Patient does not use oxygen at home.     Yesterday she has very low urine output   On 3 L oxygen per NC now     Gave her lasix 40 mg iv x 1 yesterday   Sodium level low at 123   Has poor oral intake     Interval HX     Sodium level improved from 118 ---> 122 ---> 124   Denies any SOB   More awake and alert   Responds to simple commands       Past Medical History:   Diagnosis Date    Atrial fibrillation (HCC)     Cancer (HCC)     breast cancer     CHF (congestive heart failure) (HCC)     Hyperlipidemia     Hypertension     Seasonal allergies     Thyroid disease        Past Surgical History:   Procedure Laterality Date    EYE SURGERY      HIP SURGERY Left 7-26-14    LEFT HIP HEMIARTHROPLASTY                 HYSTERECTOMY           Current Medications:      iron sucrose (VENOFER) 100 mg in sodium chloride 0.9 % 100 mL IVPB Q24H   levofloxacin (LEVAQUIN) tablet 750 mg Every Other Day   lactobacillus (CULTURELLE) capsule 1 capsule BID WC   sodium chloride tablet 1 g TID WC   urea (URE-NA) packet 15 g BID   ferrous sulfate tablet 325 mg BID WC   rivaroxaban (XARELTO) tablet 15 mg Daily   aspirin chewable tablet 324 mg Once   levothyroxine (SYNTHROID) tablet 75 mcg Daily   citalopram (CELEXA) tablet 10 mg Daily   sodium chloride flush 0.9 % injection 10 mL 2 times per day   sodium chloride flush 0.9 % injection 10 mL PRN   magnesium hydroxide (MILK OF MAGNESIA) 400 MG/5ML suspension 30 mL Daily PRN   ondansetron (ZOFRAN) injection 4 mg Q6H PRN   potassium chloride 10 mEq/100 mL IVPB (Peripheral Line) PRN   magnesium sulfate 1 g in dextrose 5% 100 mL IVPB PRN   acetaminophen (TYLENOL) tablet 650 mg Q4H PRN   cetirizine (ZYRTEC) tablet 5 mg Nightly   albuterol (PROVENTIL) nebulizer solution 2.5 mg Q4H WA         Physical exam:     Vitals:  /73   Pulse 77   Temp 97 °F (36.1 °C) (Temporal)   Resp 18   Ht 5' 2\" (1.575 m)   Wt 111 lb (50.3 kg)   SpO2 96%   BMI 20.30 kg/m²   Constitutional:  OAA X3 NAD  Skin: no rash, turgor wnl  Heent:  eomi, mmm  Neck: no bruits or jvd noted  Cardiovascular:  S1, S2 without m/r/g  Respiratory: Rales +  Abdomen:  +bs, soft, nt, nd  Ext: no lower extremity edema  Psychiatric: mood and affect appropriate  Musculoskeletal:  Rom, muscular strength intact    Labs:  CBC:   Recent Labs     05/07/19  0551   WBC 8.1   HGB 8.6*        BMP:    Recent Labs     05/06/19 2200 05/07/19 0156 05/07/19  0551   * 124* 124*   K 3.6 3.7 3.6   CL 82* 83* 83*   CO2 29 30 29   BUN 31* 36* 30*   CREATININE 0.5* 0.6 0.5*   GLUCOSE 126* 121* 105*     Ca/Mg/Phos:   Recent Labs     05/05/19  0411  05/06/19  0552  05/06/19 2200 05/07/19  0156 05/07/19  0551   CALCIUM 8.3   < > 8.6   < > 8.7 8.8 8.7   MG 1.90  --  1.90  --   --   --  1.90    < > = values in this interval not displayed. Hepatic:   No results for input(s): AST, ALT, ALB, BILITOT, ALKPHOS in the last 72 hours. Troponin:   No results for input(s): TROPONINI in the last 72 hours. BNP: No results for input(s): BNP in the last 72 hours. Lipids:   No results for input(s): CHOL, TRIG, HDL, LDLCALC, LABVLDL in the last 72 hours. ABGs: No results for input(s): PHART, PO2ART, UTT9YZN in the last 72 hours. INR: No results for input(s): INR in the last 72 hours.   UA:No results for input(s): Forestine Loffler, GLUCOSEU, BILIRUBINUR, Toby Collie, BLOODU, PHUR, PROTEINU, UROBILINOGEN, NITRU, LEUKOCYTESUR, Steve Elisa in the last 72 hours. Urine Microscopic: No results for input(s): LABCAST, BACTERIA, COMU, HYALCAST, WBCUA, RBCUA, EPIU in the last 72 hours. Urine Culture: No results for input(s): LABURIN in the last 72 hours. Urine Chemistry:   Recent Labs     05/05/19  0422   LABCREA 233.2   NAUR <20                IMAGING:  CT Chest Pulmonary Embolism W Contrast   Final Result   No evidence of an acute pulmonary embolus. Study was mildly motion degraded. Airspace disease in the right upper lobe posterior segment consistent with   pneumonia. There is suspicion of superimposed mild interstitial edema with trace   bilateral pleural fluid and mild to moderate cardiomegaly. Subacute displaced overlapping fracture of the mid body of the sternum. Clinical correlation recommended. CT HEAD WO CONTRAST   Final Result   1. No acute intracranial abnormality. 2. Age-appropriate atrophy with chronic small vessel ischemic white matter   disease. 3. Paranasal sinus disease, as detailed above. XR CHEST STANDARD (2 VW)   Final Result   No acute cardiopulmonary process. COPD. I/O last 3 completed shifts: In: 840 [P.O.:840]  Out: 1400 [Urine:450; Drains:950]          Assessment/Plan :      1. Acute hyponatremia. Improving     Low solute intake   Poor oral intake  on sodium chloride tablets   On urea pack 15 gram po bid     Goal of correction is 10-12 Meq / 24 hrs  Monitor urine output  Monitor neurological status. If any change  please notify me. 2. Anuria. UO improved   Lasix as needed  ECHO results pending     3. Anemia. Has iron deficiency anemia. Getting iv venofer     4. Acid- base disorder.  Improving       Check BMP in 4 hrs                  Thank you for allowing us to participate in care of [facility]         Electronically signed by: Dorian Mcfadden MD, 5/7/2019, 8:28 AM      [facility] [address]  Office : [phone]  Fax :[phone]

## 2019-05-07 NOTE — PROGRESS NOTES
Pt sodium level 121 at 600pm, MD notified of results, with new order to give urena pack, awaiting medication from pharmacy

## 2019-05-07 NOTE — PROGRESS NOTES
Pt awake in chair but returned to bed. VSS, assessment complete and medications given. Pt leaving for echo at this time. Call light in reach and bed alarm engaged.

## 2019-05-07 NOTE — PROGRESS NOTES
Hospital Medicine Progress Note     Date:  5/7/2019    PCP: Olegario Brown MD (Tel: [phone])    Date of Admission: 5/3/2019      Brief hospital course: Pt presented to the ER after feeling weak and dizzy while she was on the way to the bathroom and ended collapsing to the floor, denies LOC, palpitations, head trauma and states she fell because she was SOB and weak. Subjective  Lying comfortably in bed, no new complaints. Objective  Physical exam:  Vitals: BP (!) 144/69   Pulse 71   Temp 97.5 °F (36.4 °C) (Temporal)   Resp 18   Ht 5' 2\" (1.575 m)   Wt 111 lb (50.3 kg)   SpO2 95%   BMI 20.30 kg/m²   Gen: Not in distress. Alert. Head: Normocephalic. Atraumatic. Eyes: EOMI. Good acuity. ENT: Oral mucosa moist  Neck: No JVD. No obvious thyromegaly. CVS: Nml S1S2, no MRG, RRR  Pulmomary: Clear bilaterally. No crackles. No wheezes. Gastrointestinal: Soft, NT/ND. Positive bowel sounds. Musculoskeletal: No edema. Warm  Neuro: No focal deficit. Moves extremity spontaneously. Psychiatry: Appropriate affect. Not agitated. Skin: Warm, dry with normal turgor. No rash      24HR INTAKE/OUTPUT:      Intake/Output Summary (Last 24 hours) at 5/7/2019 1209  Last data filed at 5/7/2019 0515  Gross per 24 hour   Intake 600 ml   Output 1400 ml   Net -800 ml     I/O last 3 completed shifts: In: 840 [P.O.:840]  Out: 1400 [Urine:450; Drains:950]  No intake/output data recorded.       Meds:    [START ON 5/8/2019] pantoprazole  40 mg Oral QAM AC    guaiFENesin  600 mg Oral BID    iron sucrose  100 mg Intravenous Q24H    levofloxacin  750 mg Oral Every Other Day    lactobacillus  1 capsule Oral BID WC    sodium chloride  1 g Oral TID WC    urea  15 g Oral BID    rivaroxaban  15 mg Oral Daily    aspirin  324 mg Oral Once    levothyroxine  75 mcg Oral Daily    citalopram  10 mg Oral Daily    sodium chloride flush  10 mL Intravenous 2 times per day    cetirizine  5 mg Oral Nightly    sulfate      Hypothyroidism  - cont synthroid      Diet: DIET CARDIAC; Dysphagia III Advanced;  Nectar Thick; Daily Fluid Restriction: 1800 ml    Activity: up with assist  Prophylaxis: xarelto    Code status: DNR-CC     ----------        Keke Soto MD  -------------------------------  Rounding hospitalist

## 2019-05-07 NOTE — PROGRESS NOTES
Speech Language Pathology  Dysphagia Treatment Note    Name:  Bernice Novak  :   1925  Medical Diagnosis:  CHF (congestive heart failure), NYHA class I, acute on chronic, combined ([facility] [address] [I50.43]  CHF (congestive heart failure), NYHA class I, acute on chronic, combined ([facility] [address] [I50.43]  Treatment Diagnosis: Oropharyngeal Dysphagia  Pain level:  Pt did not report pain    Per nurse report, Pt has experienced increased confusion over the past several days. Currently the Pt is oriented to person and place but not to time, situation, or recall of prior dysphagia treatment. Therefore, cognitive goals will be established to improved swallowing safety and compliance with recommendations (see Cognitive goals, below)    Current Diet Level:   1) Dysphagia III Advanced with NECTAR thick liquids, meds with puree or 1 at a time with nectar thick liquids  2) Allow thin water in isolation between meals s/p oral care     Tolerance of Current Diet Level: Nurse reports Pt was held NPO yesterday due to increasing confusion and noted coughing with po meds with applesauce. Currently the nurse reports Pt has not eaten today    Assessment of Texture Tolerance:  -Impressions: Pt sleeping semi upright and leaning to R upon my arrival. Pt was awakened with verbal cues but required assistance with repositioning to upright positioning. When Pt initially began speaking, imprecise articulation vs dysarthria noted. However, intelligibility improved over time. Pt demonstrates poor recall of dysphagia education provided and rationale for current nectar thick liquids. With po trials of thin liquid via tsp, immediate bolus loss to pharynx with mild delayed swallow initiation and clinical symptoms of penetration/aspiration with delayed weak cough noted. Premature bolus loss to pharynx with increased timely swallow initiation, improved laryngeal excursion and no overt signs of aspiration noted with textures > thins.  However, prolonged mastication returning home.     Timed Code Treatment: 10 min    Total Treatment Time: 25 min    Fidel ALLENXVUNM [facility]#[id number]

## 2019-05-07 NOTE — PLAN OF CARE
Problem: Falls - Risk of:  Goal: Will remain free from falls  Description  Will remain free from falls  Note:         Problem: Neurological  Goal: Maximum potential motor/sensory/cognitive function  Note:   Pt confused. Blood sodium level low and measured q4h. Results reported to nephrologist. Medications administered. Pt has camera in room and is reoriented frequently. Will monitor     Problem: Respiratory  Goal: O2 Sat > 90%  Note:   Pt lung sounds diminished, O2sat>90 on 2.5 L humidified NC. Pt encouraged to use acepella, IS, C&DB. Breathing treatments and ABX administered. Will continue to monitor.

## 2019-05-07 NOTE — PROGRESS NOTES
Occupational Therapy   Occupational Therapy Initial Assessment  Date: 2019   Patient Name: Carson Conde  MRN: [id number]     : 1925    Date of Service: 2019    Discharge Recommendations:    Carson Conde scored a 10/24 on the AM-PAC ADL Inpatient form. Current research shows that an AM-PAC score of 17 or less is typically not associated with a discharge to the patient's home setting. Based on the patients AM-PAC score and their current ADL deficits, it is recommended that the patient have 3-5 sessions per week of Occupational Therapy at d/c to increase the patients independence. Assessment   Performance deficits / Impairments: Decreased functional mobility ; Decreased cognition;Decreased high-level IADLs;Decreased ADL status; Decreased endurance;Decreased coordination;Decreased balance;Decreased safe awareness  Treatment Diagnosis: decreasaed independence with ADL related to late affects of CHF  Prognosis: Good  Decision Making: Low Complexity  Exam: ADL, mobility, transfers  Assistance / Modification: physical assistance  Patient Education: OT eval, transfers, orientation  Barriers to Learning: cognition  REQUIRES OT FOLLOW UP: Yes  Activity Tolerance  Activity Tolerance: Treatment limited secondary to decreased cognition  Safety Devices  Safety Devices in place: Yes  Type of devices: All fall risk precautions in place; Chair alarm in place;Call light within reach; Left in chair           Patient Diagnosis(es): The primary encounter diagnosis was Congestive heart failure, unspecified HF chronicity, unspecified heart failure type ([address] Diagnoses of Pneumonia due to organism, Hypoxia, Fall, initial encounter, and Elevated troponin were also pertinent to this visit. has a past medical history of Atrial fibrillation ([address] Cancer ([address] CHF (congestive heart failure) ([address] Hyperlipidemia, Hypertension, Seasonal allergies, and Thyroid disease.    has a past surgical history that includes eye surgery; Hysterectomy; and hip surgery (Left, 7-26-14). Treatment Diagnosis: decreasaed independence with ADL related to late affects of CHF      Restrictions  Restrictions/Precautions  Restrictions/Precautions: Fall Risk  Position Activity Restriction  Other position/activity restrictions: Delfin Moss is a 80 y.o. female who presents to the ED for shortness of breath. Patient reports she was at home and was walking to the bathroom and became weak and dizzy and decided to return but became too weak and collapsed to the ground. She denies head injury or loss of consciousness. She feels very short of breath. She denies any chest pain or fevers. Subjective   General  Chart Reviewed: Yes  Family / Caregiver Present: No  General Comment  Comments: 80 y.o. female with PMHx of Atrial fibrillation, CHF, hyperlipidemia, hypertension and thyroid disease  presented to [facility] emergency room in which the patient states that today she was on her way to the bathroom. She began to feel weak and dizzy and collapsed to the floor. She denies losing consciousness. She denies hitting her head. She states \" I was just extremely short of breath\". Pain Assessment  Pain Assessment: 0-10  Pain Level: 0  Oxygen Therapy  SpO2: 95 %  Pulse Oximeter Device Mode: Intermittent  Pulse Oximeter Device Location: Finger  O2 Device: Nasal cannula  O2 Flow Rate (L/min): 3 L/min  Social/Functional History  Social/Functional History  Lives With: Alone  Type of Home: Assisted living  Home Access: Level entry  Home Equipment: Rolling walker, Cane  ADL Assistance: Needs assistance(assistance with bathing, independent with dressing)  Homemaking Responsibilities: No  Ambulation Assistance: Independent(uses RW for ambulation)  Transfer Assistance: Independent  Additional Comments: Pt difficult to understand when answering questions. Poor historian. Son verified that pt just recently moved to [address] [place] [address] (Feb 2019) from her private residence.  Was independent with mobility at that time. Now using RW and walking short distances within AL. Believes facility helps with bathing/dressing. Son also believes facility can provide increased assistance if needed. Reports that her confusion is not her baseline. Objective        Orientation  Overall Orientation Status: Impaired  Orientation Level: Oriented to person  Observation/Palpation  Posture: Fair  Observation: mild kyphosis  Balance  Sitting Balance: Moderate assistance  Standing Balance: Dependent/Total  Standing Balance  Time: less than one minute  Activity: pericare, lower body dressing  Comment: max of two to min of one fluctuates  ADL  UE Bathing: Minimal assistance  LE Bathing: Dependent/Total(max of one to stand and max of one to pull up depend)  UE Dressing: Minimal assistance  LE Dressing: Dependent/Total(max of one to stand and max of one to pull up depend)  Toileting: Dependent/Total(catheter)  Additional Comments: pateint completed bathing and dressing from chair level  Tone RUE  RUE Tone: Normotonic  Tone LUE  LUE Tone: Normotonic  Coordination  Movements Are Fluid And Coordinated: No  Coordination and Movement description: Right UE;Left UE;Decreased speed     Bed mobility  Rolling to Right: Moderate assistance  Supine to Sit: Moderate assistance  Scooting: Moderate assistance  Transfers  Sit Pivot Transfers: Dependent/Total(max of two, patient did not extend to stand or weight bear during transfer)  Sit to stand: 2 Person assistance  Stand to sit: 2 Person assistance  Transfer Comments: fluctuates with sit to stand from min of one to dependent of two, complained of left hip pain     Cognition  Overall Cognitive Status: Exceptions  Arousal/Alertness: Inconsistent responses to stimuli  Following Commands: Follows one step commands with increased time; Follows one step commands with repetition  Attention Span: Attends with cues to redirect  Memory: Decreased recall of biographical Information;Decreased recall of recent events;Decreased short term memory;Decreased long term memory  Safety Judgement: Decreased awareness of need for safety  Problem Solving: Assistance required to generate solutions;Assistance required to implement solutions;Assistance required to correct errors made;Decreased awareness of errors  Insights: Decreased awareness of deficits  Initiation: Requires cues for all  Sequencing: Requires cues for all        Sensation  Overall Sensation Status: WFL        LUE AROM (degrees)  LUE AROM : WFL  RUE AROM (degrees)  RUE AROM : WFL                      Plan   Plan  Times per week: 3-5  Times per day: Daily  Current Treatment Recommendations: Safety Education & Training, Balance Training, Patient/Caregiver Education & Training, Self-Care / ADL, Functional Mobility Training, Neuromuscular Re-education, Endurance Training    G-Code     OutComes Score                                                  AM-PAC Score        AM-PAC Inpatient Daily Activity Raw Score: 10  AM-PAC Inpatient ADL T-Scale Score : 27.31  ADL Inpatient CMS 0-100% Score: 74.7  ADL Inpatient CMS G-Code Modifier : CL    Goals  Short term goals  Short term goal 1: min assist bathing from chair  Short term goal 2: min assist functional transfers  Short term goal 3: min assist toileting  Short term goal 4: increase am pac score from 10 to 14  Patient Goals   Patient goals : patient's unable to state son's goal is for her to go back to assisted living       Therapy Time   Individual Concurrent Group Co-treatment   Time In 0816         Time Out 0855         Minutes 39         Timed Code Treatment Minutes: [address] Gris Nayak OT

## 2019-05-07 NOTE — PROGRESS NOTES
[facility] Pulmonary/CCM Progress note      Admit Date: 5/3/2019    Chief Complaint: Shortness of breath    Subjective: Interval History: Somewhat drowsy and sleepy today, sodium has improved appropriately to 126. No worsening dyspnea or cough. O2 requirements stable at 3 L.   Scheduled Meds:   [START ON 5/8/2019] pantoprazole  40 mg Oral QAM AC    iron sucrose  100 mg Intravenous Q24H    levofloxacin  750 mg Oral Every Other Day    lactobacillus  1 capsule Oral BID WC    sodium chloride  1 g Oral TID WC    urea  15 g Oral BID    ferrous sulfate  325 mg Oral BID WC    rivaroxaban  15 mg Oral Daily    aspirin  324 mg Oral Once    levothyroxine  75 mcg Oral Daily    citalopram  10 mg Oral Daily    sodium chloride flush  10 mL Intravenous 2 times per day    cetirizine  5 mg Oral Nightly    albuterol  2.5 mg Nebulization Q4H WA     Continuous Infusions:  PRN Meds:sodium chloride flush, magnesium hydroxide, ondansetron, potassium chloride, magnesium sulfate, acetaminophen    Review of Systems  Constitutional: negative for fatigue, fevers, malaise and weight loss  Ears, nose, mouth, throat: negative for ear drainage, epistaxis, hoarseness, nasal congestion, sore throat and voice change  Respiratory: negative except for shortness of breath  Cardiovascular: negative for chest pain, chest pressure/discomfort, irregular heart beat, lower extremity edema and palpitations  Gastrointestinal: negative for abdominal pain, constipation, diarrhea, jaundice, melena, odynophagia, reflux symptoms and vomiting  Hematologic/lymphatic: negative for bleeding, easy bruising, lymphadenopathy and petechiae  Musculoskeletal:negative for arthralgias, bone pain, muscle weakness, neck pain and stiff joints  Neurological: negative for dizziness, gait problems, headaches, seizures, speech problems, tremors and weakness  Behavioral/Psych: negative for anxiety, behavior problems, depression, fatigue and sleep disturbance  Endocrine: negative for diabetic symptoms including none, neuropathy, polyphagia, polyuria, polydipsia, vomiting and diarrhea and temperature intolerance  Allergic/Immunologic: negative for anaphylaxis, angioedema, hay fever and urticaria    Objective:     Patient Vitals for the past 8 hrs:   BP Temp Temp src Pulse Resp SpO2 Weight   05/07/19 0908 -- -- -- -- -- 95 % --   05/07/19 0901 (!) 144/69 97.5 °F (36.4 °C) Temporal 71 18 95 % --   05/07/19 0515 135/73 97 °F (36.1 °C) Temporal 77 18 96 % 111 lb (50.3 kg)     I/O last 3 completed shifts: In: 840 [P.O.:840]  Out: 1400 [Urine:450; Drains:950]  No intake/output data recorded.     General Appearance: alert and oriented to person, place and time, well developed and well- nourished, in no acute distress  Skin: warm and dry, no rash or erythema  Head: normocephalic and atraumatic  Eyes: pupils equal, round, and reactive to light, extraocular eye movements intact, conjunctivae normal  ENT: external ear and ear canal normal bilaterally, nose without deformity, nasal mucosa and turbinates normal  Neck: supple and non-tender without mass, no cervical lymphadenopathy  Pulmonary/Chest: clear to auscultation bilaterally- no wheezes, rales or rhonchi, normal air movement, no respiratory distress  Cardiovascular: normal rate, regular rhythm,  no murmurs, rubs, distal pulses intact, no carotid bruits  Abdomen: soft, non-tender, non-distended, normal bowel sounds, no masses or organomegaly  Lymph Nodes: Cervical, supraclavicular normal  Extremities: no cyanosis, clubbing or edema  Musculoskeletal: normal range of motion, no joint swelling, deformity or tenderness  Neurologic: alert, no focal neurologic deficits    Data Review:  CBC:   Lab Results   Component Value Date    WBC 8.1 05/07/2019    RBC 3.62 05/07/2019     BMP:   Lab Results   Component Value Date    GLUCOSE 108 05/07/2019    CO2 32 05/07/2019    BUN 25 05/07/2019    CREATININE 0.6 05/07/2019    CALCIUM 8.8 05/07/2019     ABG: No results found for: Nova Seller, [id number], PHART, THGBART, [id number], PO2ART, [id number]    Radiology: All pertinent images / reports were reviewed as a part of this visit. Narrative   EXAMINATION:   CTA OF THE CHEST 5/3/2019 5:15 pm       TECHNIQUE:   CTA of the chest was performed after the administration of intravenous   contrast.  Multiplanar reformatted images are provided for review.  MIP   images are provided for review. Dose modulation, iterative reconstruction,   and/or weight based adjustment of the mA/kV was utilized to reduce the   radiation dose to as low as reasonably achievable.       COMPARISON:   Chest x-ray 05/03/2019       HISTORY:   ORDERING SYSTEM PROVIDED HISTORY: SOB, hypoxia, eval for PE   TECHNOLOGIST PROVIDED HISTORY:   Ordering Physician Provided Reason for Exam: SOB, hypoxia, eval for PE   Acuity: Acute   Type of Exam: Initial   Relevant Medical/Surgical History: Shortness of Breath (does not wear O2,   states no HX with O2 problems, 81% on RA at SNF, is on a nonrebreather)       FINDINGS:   Pulmonary Arteries: Study is motion degraded.  Pulmonary arteries are   adequately opacified for evaluation.  No evidence of intraluminal filling   defect to suggest pulmonary embolism.  Main pulmonary artery is normal in   caliber.       Mediastinum: No evidence of mediastinal lymphadenopathy.  The heart and   pericardium demonstrate no acute abnormality.  Calcific coronary artery   disease.  The ascending aorta is ectatic measuring up to 4.1 cm in diameter. Descending thoracic aorta is normal in caliber.  The heart is mildly to   moderately enlarged.  There is no acute abnormality of the thoracic aorta.       Lungs/pleura:  There is a small area of confluent airspace disease in the   posterior segment of the right upper lobe marginated posteriorly by the major   fissure.  There is mild pulmonary interstitial edema. Bunny Osier is trace   bilateral pleural fluid.  No pneumothorax.       Upper Abdomen: Limited images of the upper abdomen are unremarkable.       Soft Tissues/Bones: There is a displaced overlapping fracture of the mid body   of the sternum.  There is callus formation suggesting a subacute age.           Impression   No evidence of an acute pulmonary embolus.  Study was mildly motion degraded.       Airspace disease in the right upper lobe posterior segment consistent with   pneumonia.       There is suspicion of superimposed mild interstitial edema with trace   bilateral pleural fluid and mild to moderate cardiomegaly.       Subacute displaced overlapping fracture of the mid body of the sternum. Clinical correlation recommended. Problem List:     Acute hypoxic respiratory failure  Right upper lobe pneumonia  Aspiration pneumonia  Hyponatremia, possibly from volume depletion    Assessment/Plan:     Reviewed patient's CT imaging which is consistent with right upper lobe pneumonia, possibly aspiration related. Levaquin 750 mg daily, complete a two-week course. Hyponatremia, urine sodium <20. Possibly represents volume depletion rather than SIADH. Sodium 126, appropriate improvement. Nephrology following. Dysphagia 3 diet with nectar thick liquids. Will follow-up in one month, will require repeat imaging to confirm clearance of pneumonia. Patient very frail, not a good candidate for invasive investigations.     Collin Simpson MD

## 2019-05-07 NOTE — PLAN OF CARE
Problem: Falls - Risk of:  Goal: Will remain free from falls  Description  Will remain free from falls  Outcome: Ongoing  Pt will remain free from falls. Fall precautions in place and alarm engaged. Bedside table and call light within reach. Pt aware to use call light for assistance ambulating. Problem: Risk for Impaired Skin Integrity  Goal: Tissue integrity - skin and mucous membranes  Description  Structural intactness and normal physiological function of skin and  mucous membranes. Outcome: Ongoing  Pt will remain free from skin breakdown. Pt turned Q2hrs, skin kept clean and dry, and skin assessed per shift or as needed.         Problem: Respiratory  Goal: O2 Sat > 90%  Outcome: Ongoing  02 sats above 90% on 3 liters NC     Problem: OXYGENATION/RESPIRATORY FUNCTION  Goal: Patient will maintain patent airway  Outcome: Ongoing     Problem: HEMODYNAMIC STATUS  Goal: Patient has stable vital signs and fluid balance  Outcome: Ongoing

## 2019-05-07 NOTE — CARE COORDINATION
Called patient's son to communicate therapy recommendations, and offered assistance with a referral to [facility].

## 2019-05-08 LAB
ANION GAP SERPL CALCULATED.3IONS-SCNC: 10 MMOL/L (ref 3–16)
ANION GAP SERPL CALCULATED.3IONS-SCNC: 12 MMOL/L (ref 3–16)
ANION GAP SERPL CALCULATED.3IONS-SCNC: 12 MMOL/L (ref 3–16)
ANION GAP SERPL CALCULATED.3IONS-SCNC: 9 MMOL/L (ref 3–16)
BUN BLDV-MCNC: 35 MG/DL (ref 7–20)
BUN BLDV-MCNC: 38 MG/DL (ref 7–20)
BUN BLDV-MCNC: 43 MG/DL (ref 7–20)
BUN BLDV-MCNC: 44 MG/DL (ref 7–20)
CALCIUM SERPL-MCNC: 8.7 MG/DL (ref 8.3–10.6)
CALCIUM SERPL-MCNC: 8.7 MG/DL (ref 8.3–10.6)
CALCIUM SERPL-MCNC: 8.8 MG/DL (ref 8.3–10.6)
CALCIUM SERPL-MCNC: 9.1 MG/DL (ref 8.3–10.6)
CHLORIDE BLD-SCNC: 86 MMOL/L (ref 99–110)
CHLORIDE BLD-SCNC: 87 MMOL/L (ref 99–110)
CHLORIDE BLD-SCNC: 87 MMOL/L (ref 99–110)
CHLORIDE BLD-SCNC: 90 MMOL/L (ref 99–110)
CO2: 29 MMOL/L (ref 21–32)
CO2: 29 MMOL/L (ref 21–32)
CO2: 30 MMOL/L (ref 21–32)
CO2: 30 MMOL/L (ref 21–32)
CREAT SERPL-MCNC: 0.5 MG/DL (ref 0.6–1.2)
CREAT SERPL-MCNC: 0.6 MG/DL (ref 0.6–1.2)
GFR AFRICAN AMERICAN: >60
GFR NON-AFRICAN AMERICAN: >60
GLUCOSE BLD-MCNC: 101 MG/DL (ref 70–99)
GLUCOSE BLD-MCNC: 105 MG/DL (ref 70–99)
GLUCOSE BLD-MCNC: 123 MG/DL (ref 70–99)
GLUCOSE BLD-MCNC: 135 MG/DL (ref 70–99)
HCT VFR BLD CALC: 27.7 % (ref 36–48)
HEMOGLOBIN: 8.8 G/DL (ref 12–16)
MAGNESIUM: 2.2 MG/DL (ref 1.8–2.4)
MCH RBC QN AUTO: 24 PG (ref 26–34)
MCHC RBC AUTO-ENTMCNC: 31.8 G/DL (ref 31–36)
MCV RBC AUTO: 75.5 FL (ref 80–100)
PDW BLD-RTO: 18.3 % (ref 12.4–15.4)
PLATELET # BLD: 264 K/UL (ref 135–450)
PMV BLD AUTO: 7.6 FL (ref 5–10.5)
POTASSIUM SERPL-SCNC: 3.4 MMOL/L (ref 3.5–5.1)
POTASSIUM SERPL-SCNC: 3.8 MMOL/L (ref 3.5–5.1)
POTASSIUM SERPL-SCNC: 4.3 MMOL/L (ref 3.5–5.1)
POTASSIUM SERPL-SCNC: 4.3 MMOL/L (ref 3.5–5.1)
RBC # BLD: 3.67 M/UL (ref 4–5.2)
SODIUM BLD-SCNC: 126 MMOL/L (ref 136–145)
SODIUM BLD-SCNC: 128 MMOL/L (ref 136–145)
SODIUM BLD-SCNC: 128 MMOL/L (ref 136–145)
SODIUM BLD-SCNC: 129 MMOL/L (ref 136–145)
WBC # BLD: 9 K/UL (ref 4–11)

## 2019-05-08 PROCEDURE — 97530 THERAPEUTIC ACTIVITIES: CPT

## 2019-05-08 PROCEDURE — 36415 COLL VENOUS BLD VENIPUNCTURE: CPT

## 2019-05-08 PROCEDURE — 2700000000 HC OXYGEN THERAPY PER DAY

## 2019-05-08 PROCEDURE — 2580000003 HC RX 258: Performed by: INTERNAL MEDICINE

## 2019-05-08 PROCEDURE — 83735 ASSAY OF MAGNESIUM: CPT

## 2019-05-08 PROCEDURE — 6370000000 HC RX 637 (ALT 250 FOR IP): Performed by: INTERNAL MEDICINE

## 2019-05-08 PROCEDURE — 92526 ORAL FUNCTION THERAPY: CPT

## 2019-05-08 PROCEDURE — 2580000003 HC RX 258: Performed by: NURSE PRACTITIONER

## 2019-05-08 PROCEDURE — 1200000000 HC SEMI PRIVATE

## 2019-05-08 PROCEDURE — 85027 COMPLETE CBC AUTOMATED: CPT

## 2019-05-08 PROCEDURE — 6360000002 HC RX W HCPCS: Performed by: NURSE PRACTITIONER

## 2019-05-08 PROCEDURE — 6370000000 HC RX 637 (ALT 250 FOR IP): Performed by: NURSE PRACTITIONER

## 2019-05-08 PROCEDURE — 97535 SELF CARE MNGMENT TRAINING: CPT

## 2019-05-08 PROCEDURE — 80048 BASIC METABOLIC PNL TOTAL CA: CPT

## 2019-05-08 PROCEDURE — 99232 SBSQ HOSP IP/OBS MODERATE 35: CPT | Performed by: INTERNAL MEDICINE

## 2019-05-08 PROCEDURE — 94760 N-INVAS EAR/PLS OXIMETRY 1: CPT

## 2019-05-08 PROCEDURE — 97127 HC SP THER IVNTJ W/FOCUS COG FUNCJ: CPT

## 2019-05-08 PROCEDURE — 6360000002 HC RX W HCPCS: Performed by: INTERNAL MEDICINE

## 2019-05-08 PROCEDURE — 94640 AIRWAY INHALATION TREATMENT: CPT

## 2019-05-08 PROCEDURE — 6370000000 HC RX 637 (ALT 250 FOR IP): Performed by: FAMILY MEDICINE

## 2019-05-08 RX ORDER — SODIUM CHLORIDE 1000 MG
1 TABLET, SOLUBLE MISCELLANEOUS 2 TIMES DAILY WITH MEALS
Status: DISCONTINUED | OUTPATIENT
Start: 2019-05-08 | End: 2019-05-10

## 2019-05-08 RX ADMIN — LEVOFLOXACIN 750 MG: 500 TABLET, FILM COATED ORAL at 08:02

## 2019-05-08 RX ADMIN — ALBUTEROL SULFATE 2.5 MG: 2.5 SOLUTION RESPIRATORY (INHALATION) at 20:04

## 2019-05-08 RX ADMIN — IRON SUCROSE 100 MG: 20 INJECTION, SOLUTION INTRAVENOUS at 11:38

## 2019-05-08 RX ADMIN — LEVOTHYROXINE SODIUM 75 MCG: 75 TABLET ORAL at 05:54

## 2019-05-08 RX ADMIN — Medication 10 MEQ: at 06:19

## 2019-05-08 RX ADMIN — Medication 1 CAPSULE: at 16:49

## 2019-05-08 RX ADMIN — CETIRIZINE HYDROCHLORIDE 5 MG: 10 TABLET, FILM COATED ORAL at 20:17

## 2019-05-08 RX ADMIN — Medication 15 G: at 20:17

## 2019-05-08 RX ADMIN — RIVAROXABAN 15 MG: 15 TABLET, FILM COATED ORAL at 08:02

## 2019-05-08 RX ADMIN — Medication 10 MEQ: at 04:50

## 2019-05-08 RX ADMIN — ALBUTEROL SULFATE 2.5 MG: 2.5 SOLUTION RESPIRATORY (INHALATION) at 09:24

## 2019-05-08 RX ADMIN — PANTOPRAZOLE SODIUM 40 MG: 40 TABLET, DELAYED RELEASE ORAL at 05:54

## 2019-05-08 RX ADMIN — Medication 10 ML: at 08:03

## 2019-05-08 RX ADMIN — Medication 10 MEQ: at 02:51

## 2019-05-08 RX ADMIN — SODIUM CHLORIDE TAB 1 GM 1 G: 1 TAB at 08:06

## 2019-05-08 RX ADMIN — Medication 15 G: at 08:06

## 2019-05-08 RX ADMIN — ALBUTEROL SULFATE 2.5 MG: 2.5 SOLUTION RESPIRATORY (INHALATION) at 12:39

## 2019-05-08 RX ADMIN — ALBUTEROL SULFATE 2.5 MG: 2.5 SOLUTION RESPIRATORY (INHALATION) at 16:39

## 2019-05-08 RX ADMIN — CITALOPRAM HYDROBROMIDE 10 MG: 20 TABLET ORAL at 08:02

## 2019-05-08 RX ADMIN — Medication 10 ML: at 20:17

## 2019-05-08 RX ADMIN — Medication 1 CAPSULE: at 08:03

## 2019-05-08 RX ADMIN — SODIUM CHLORIDE TAB 1 GM 1 G: 1 TAB at 16:49

## 2019-05-08 ASSESSMENT — ENCOUNTER SYMPTOMS
SHORTNESS OF BREATH: 0
TROUBLE SWALLOWING: 0
NAUSEA: 0
FACIAL SWELLING: 0
APNEA: 0
ABDOMINAL PAIN: 0
BLOOD IN STOOL: 0
EYE REDNESS: 0
RHINORRHEA: 0
COLOR CHANGE: 0
CHOKING: 0
DIARRHEA: 0
PHOTOPHOBIA: 0
STRIDOR: 0
COUGH: 0
EYE DISCHARGE: 0
CHEST TIGHTNESS: 0

## 2019-05-08 ASSESSMENT — PAIN SCALES - GENERAL
PAINLEVEL_OUTOF10: 0

## 2019-05-08 ASSESSMENT — PAIN DESCRIPTION - LOCATION
LOCATION: BACK
LOCATION: BACK;COCCYX

## 2019-05-08 ASSESSMENT — PAIN SCALES - WONG BAKER
WONGBAKER_NUMERICALRESPONSE: 2
WONGBAKER_NUMERICALRESPONSE: 2

## 2019-05-08 NOTE — PLAN OF CARE
Pt noted to have a red blanchable area on her coccyx. Mepilex dressing will be applied. Pt will be turned Q 2 hours. Pt has a nassar catheter to protect skin from urine. Heels elevated on a pillow off the bed. Will continue to monitor.

## 2019-05-08 NOTE — PLAN OF CARE
Pt's O2 sat noted to be 94% on 2.5 L NC. Pt slowly being weaned off O2. Pt's respirations are easy and unlabored while at rest. Pt assessed for swallowing by speech and is at risk for aspiration. Will seat Pt upright at 90 degrees while giving medications. Medications administered in applesauce per speeches recommendations. Pt's liquids thickened to nectar thick consistency.

## 2019-05-08 NOTE — PROGRESS NOTES
Nephrology Note        Chief Complaint:    Chief Complaint   Patient presents with    Shortness of Breath     does not wear O2, states no HX with O2 problems, 81% on RA at SNF, is on a nonrebreather         History of Present iIlness:    Tawnya Dickey is a 80 y.o. female with h/o a . Fib, CHF, HTN was admitted with generalized weakness. She began to feel weak and dizzy and collapsed to the floor. She denies losing consciousness. She denies hitting her head. She states \" I was just extremely short of breath\". Patient arrived by EMS and it was stated she was 81% on room air at the [facility] nursing facility. Patient was placed on a nonrebreather satting 93%. Patient does not use oxygen at home. Yesterday she has very low urine output   On 3 L oxygen per NC now     Gave her lasix 40 mg iv x 1 yesterday   Sodium level low at 123   Has poor oral intake     Interval HX     Sodium level improved from 118 ---> 122 ---> 124 --.  126 ---> 129   Denies any SOB   awake and alert   Responds to simple commands       Past Medical History:   Diagnosis Date    Atrial fibrillation (HCC)     Cancer (HCC)     breast cancer     CHF (congestive heart failure) (HCC)     Hyperlipidemia     Hypertension     Seasonal allergies     Thyroid disease        Past Surgical History:   Procedure Laterality Date    EYE SURGERY      HIP SURGERY Left 7-26-14    LEFT HIP HEMIARTHROPLASTY                 HYSTERECTOMY           Current Medications:      sodium chloride tablet 1 g BID WC   pantoprazole (PROTONIX) tablet 40 mg QAM AC   guaiFENesin (MUCINEX) extended release tablet 600 mg BID   iron sucrose (VENOFER) 100 mg in sodium chloride 0.9 % 100 mL IVPB Q24H   levofloxacin (LEVAQUIN) tablet 750 mg Every Other Day   lactobacillus (CULTURELLE) capsule 1 capsule BID WC   urea (URE-NA) packet 15 g BID   rivaroxaban (XARELTO) tablet 15 mg Daily   aspirin chewable tablet 324 mg Once   levothyroxine (SYNTHROID) tablet 75 mcg Daily citalopram (CELEXA) tablet 10 mg Daily   sodium chloride flush 0.9 % injection 10 mL 2 times per day   sodium chloride flush 0.9 % injection 10 mL PRN   magnesium hydroxide (MILK OF MAGNESIA) 400 MG/5ML suspension 30 mL Daily PRN   ondansetron (ZOFRAN) injection 4 mg Q6H PRN   potassium chloride 10 mEq/100 mL IVPB (Peripheral Line) PRN   magnesium sulfate 1 g in dextrose 5% 100 mL IVPB PRN   acetaminophen (TYLENOL) tablet 650 mg Q4H PRN   cetirizine (ZYRTEC) tablet 5 mg Nightly   albuterol (PROVENTIL) nebulizer solution 2.5 mg Q4H WA         Physical exam:     Vitals:  BP (!) 138/57   Pulse 73   Temp 97 °F (36.1 °C) (Temporal)   Resp 14   Ht 5' 2\" (1.575 m)   Wt 114 lb 11.2 oz (52 kg)   SpO2 95%   BMI 20.98 kg/m²   Constitutional:  OAA X3 NAD  Skin: no rash, turgor wnl  Heent:  eomi, mmm  Neck: no bruits or jvd noted  Cardiovascular:  S1, S2 without m/r/g  Respiratory: Rales +  Abdomen:  +bs, soft, nt, nd  Ext: no lower extremity edema  Psychiatric: mood and affect appropriate  Musculoskeletal:  Rom, muscular strength intact    Labs:  CBC:   Recent Labs     05/07/19  0551 05/08/19 0423   WBC 8.1 9.0   HGB 8.6* 8.8*    264     BMP:    Recent Labs     05/07/19 1808 05/08/19  0000 05/08/19  0423   * 126* 129*   K 3.4* 3.4* 3.8   CL 86* 86* 87*   CO2 29 30 30   BUN 33* 44* 35*   CREATININE 0.6 0.6 0.5*   GLUCOSE 165* 105* 101*     Ca/Mg/Phos:   Recent Labs     05/06/19  0552  05/07/19  0551  05/07/19 1808 05/08/19  0000 05/08/19  0423   CALCIUM 8.6   < > 8.7   < > 8.7 8.7 9.1   MG 1.90  --  1.90  --   --   --  2.20    < > = values in this interval not displayed. Hepatic:   No results for input(s): AST, ALT, ALB, BILITOT, ALKPHOS in the last 72 hours. Troponin:   No results for input(s): TROPONINI in the last 72 hours. BNP: No results for input(s): BNP in the last 72 hours. Lipids:   No results for input(s): CHOL, TRIG, HDL, LDLCALC, LABVLDL in the last 72 hours.   ABGs: No results for input(s): PHART, PO2ART, IBD2DSM in the last 72 hours. INR: No results for input(s): INR in the last 72 hours. UA:No results for input(s): Nabila Phy, GLUCOSEU, BILIRUBINUR, Hager Diver, BLOODU, PHUR, PROTEINU, UROBILINOGEN, NITRU, LEUKOCYTESUR, LABMICR, URINETYPE in the last 72 hours. Urine Microscopic: No results for input(s): LABCAST, BACTERIA, COMU, HYALCAST, WBCUA, RBCUA, EPIU in the last 72 hours. Urine Culture: No results for input(s): LABURIN in the last 72 hours. Urine Chemistry:   No results for input(s): Mir Kuster, PROTEINUR, NAUR in the last 72 hours. IMAGING:  XR CHEST PORTABLE   Final Result   Small right basilar pleural effusion. Mild left basilar atelectasis. CT Chest Pulmonary Embolism W Contrast   Final Result   No evidence of an acute pulmonary embolus. Study was mildly motion degraded. Airspace disease in the right upper lobe posterior segment consistent with   pneumonia. There is suspicion of superimposed mild interstitial edema with trace   bilateral pleural fluid and mild to moderate cardiomegaly. Subacute displaced overlapping fracture of the mid body of the sternum. Clinical correlation recommended. CT HEAD WO CONTRAST   Final Result   1. No acute intracranial abnormality. 2. Age-appropriate atrophy with chronic small vessel ischemic white matter   disease. 3. Paranasal sinus disease, as detailed above. XR CHEST STANDARD (2 VW)   Final Result   No acute cardiopulmonary process. COPD. I/O last 3 completed shifts: In: 240 [P.O.:240]  Out: 750 [Drains:750]          Assessment/Plan :      1. Acute hyponatremia. Improving     Low solute intake   Poor oral intake  on sodium chloride tablets . Decrease to BID dosing now   On urea pack 15 gram po bid     2. Urine output good now   Lasix as needed  ECHO results reviewed     3. Anemia. Has iron deficiency anemia.  Getting iv venofer     4. Acid-

## 2019-05-08 NOTE — CARE COORDINATION
[address] Transitions Interview     2019    Patient: Ioana Bravo Patient : 1925   MRN: [id number]  Reason for Admission: SOB  RARS: Readmission Risk Score: 15         Spoke with: Montse Gallo      Readmission Risk  Patient Active Problem List   Diagnosis    Other specified acquired hypothyroidism    [facility]fib ([facility]    HTN (hypertension)    Hip fracture, left ([facility]    CHF (congestive heart failure), NYHA class I, acute on chronic, combined ([facility]    Hyponatremia    Elevated troponin    Community acquired bacterial pneumonia    Hypoxemia    Congestive heart failure ([facility]    Hypoxia    Pneumonia due to organism    [facility]    Fall    TRENT (acute kidney injury) ([facility]    Metabolic alkalosis       Inpatient Assessment  Care Transitions Summary    Care Transitions Inpatient Review  Medication Review  Are you able to afford your medications?:  Yes  How often do you have difficulty taking your medications?:  I always take them as prescribed.   Housing Review  Who do you live with?:  Alone  Are you an active caregiver in your home?:  No  Social Support  Do you have a ?:  No  Do you have a [address] [place] [address]?:  No  Durable Medical Equipment  Patient DME:  Hailee beasley  Functional Review  Ability to seek help/take action for Emergent/Urgent situations i.e. fire, crime, inclement weather or health crisis.:  Needs Assistance  Ability handle personal hygiene needs (bathing/dressing/grooming):  Needs Assistance  Ability to manage medications:  Needs Assistance  Ability to prepare food:  Needs Assistance  Ability to maintain home (clean home, laundry):  Needs Assistance  Ability to drive and/or has transportation:  Dependent  Ability to do shopping:  Needs Assistance  Ability to manage finances:  Needs Assistance  Is patient able to live independently?:  No  Hearing and Vision  Visual Impairment:  Visual impairment

## 2019-05-08 NOTE — PROGRESS NOTES
Hospital Medicine Progress Note     Date:  5/8/2019    PCP: Army Devin MD (Tel: [phone])    Date of Admission: 5/3/2019      Brief hospital course: Pt presented to the ER after feeling weak and dizzy while she was on the way to the bathroom and ended collapsing to the floor, denies LOC, palpitations, head trauma and states she fell because she was SOB and weak. Subjective  Lying comfortably in bed, more alert today. Objective  Physical exam:  Vitals: BP (!) 148/61   Pulse 73   Temp 98.2 °F (36.8 °C)   Resp 14   Ht 5' 2\" (1.575 m)   Wt 114 lb 11.2 oz (52 kg)   SpO2 98%   BMI 20.98 kg/m²   Gen: Not in distress. Alert. Head: Normocephalic. Atraumatic. Eyes: EOMI. Good acuity. ENT: Oral mucosa moist  Neck: No JVD. No obvious thyromegaly. CVS: Nml S1S2, no MRG, RRR  Pulmomary: Clear bilaterally. No crackles. No wheezes. Gastrointestinal: Soft, NT/ND. Positive bowel sounds. Musculoskeletal: No edema. Warm  Neuro: No focal deficit. Moves extremity spontaneously. Psychiatry: Appropriate affect. Not agitated. , pleasantly demented  Skin: Warm, dry with normal turgor. No rash      24HR INTAKE/OUTPUT:      Intake/Output Summary (Last 24 hours) at 5/8/2019 1258  Last data filed at 5/7/2019 2353  Gross per 24 hour   Intake --   Output 750 ml   Net -750 ml     I/O last 3 completed shifts: In: 240 [P.O.:240]  Out: 750 [Drains:750]  No intake/output data recorded.       Meds:    sodium chloride  1 g Oral BID WC    pantoprazole  40 mg Oral QAM AC    guaiFENesin  600 mg Oral BID    levofloxacin  750 mg Oral Every Other Day    lactobacillus  1 capsule Oral BID WC    urea  15 g Oral BID    rivaroxaban  15 mg Oral Daily    aspirin  324 mg Oral Once    levothyroxine  75 mcg Oral Daily    citalopram  10 mg Oral Daily    sodium chloride flush  10 mL Intravenous 2 times per day    cetirizine  5 mg Oral Nightly    albuterol  2.5 mg Nebulization Q4H WA       Infusions:       PRN Meds: cont synthroid    Dispo: DC likely in 1 - 2 days if continues to improve and cleared by other services.       Diet: DIET DYSPHAGIA II MECHANICALLY ALTERED; Nectar Thick; Daily Fluid Restriction: 1800 ml; Cardiac, No Drinking Straw    Activity: up with assist  Prophylaxis: xarelto    Code status: DNR-CC     ----------        Angel Lange MD  -------------------------------  Rounding hospitalist

## 2019-05-08 NOTE — PROGRESS NOTES
failure  · Essential hypertension  · Mixed hyperlipidemia  · Chronic atrial fibrillation      Discussion:      The patient is on oral Levaquin. He is tolerating the antibiotics okay. Serum creatinine is 0.6. Chest x-ray reviewed. Shows small right pleural effusion    2-D echo reviewed. No valvular vegetations. Serum creatinine 0.6. Serum potassium is 4.3. Serum sodium is 128 today      Plan:     Diagnostic Workup:    · Continue to follow  fever curve, WBC count and blood cultures  · Follow up on liver and renal function    Antimicrobials:    · Will continue oral Levaquin 750 mg every 48 hours  · Will plan to stop oral Levaquin on Friday  · Cough and deep breathing exercises  · Nephrology following for hyponatremia  · She is now awake and alert. · Discussed all above with patient and RN  · Discussed with Dr. Sanchez Nguyen      Drug Monitoring:    · Continue monitoring for antibiotic toxicity as follows: CMP, QTc interval  · Continue to watch for following: new or worsening fever, new hypotension, hives, lip swelling and redness or purulence at vascular access sites. I/v access Management:    · Continue to monitor i.v access sites for erythema, induration, discharge or tenderness. · As always, continue efforts to minimize tubes/lines/drains as clinically appropriate to reduce chances of line associated infections. Patient education and counseling:        · The patient was educated in detail about the side-effects of various antibiotics and things to watch for like new rashes, lip swelling, severe reaction, worsening diarrhea, break through fever etc.  · Discussed patient's condition and what to expect. All of the patient's questions were addressed in a satisfactory manner and patient verbalized understanding all instructions. Fluoroquinolone related instructions:     Patient instructed to watch for low or high blood sugars, muscle pains and ankle tendon pain while on Ciprofloxacin or Levofloxacin. Patient was advised to keep a sugar candy at all times as all fluoroquinolones have the potential of causing hypoglycemia. If these symptoms develops, patient was instructed to stop the antibiotic and call my office at [phone]. Use sunscreen when going in bright sun while on Ciprofloxacin or Levofloxacin as these antibiotics can cause photosensitivity. Take 1 hour before or 2 hour after dairy, calcium, iron, magnesium, aluminum or zinc.      TIME SPENT TODAY:     - Spent over  26  minutes on visit (including interval history, physical exam, review of data including labs, cultures, imaging, development and implementation of treatment plan and coordination of complex care). - Over 50% of time spent with patient face to face on counseling and education. Thanks for allowing me to participate in your patient's care. I will sign off today, but will be available to answer any further questions or concerns that may arise during patient's stay in the hospital.      .    Subjective: Interval history: Patient was seen and examined at bedside. Interval history was obtained. She is more awake today. She is tolerating antibiotics okay. No diarrhea. REVIEW OF SYSTEMS:      Review of Systems   Constitutional: Negative for chills, diaphoresis and unexpected weight change. HENT: Negative for congestion, ear discharge, ear pain, facial swelling, hearing loss, rhinorrhea and trouble swallowing. Eyes: Negative for photophobia, discharge, redness and visual disturbance. Respiratory: Negative for apnea, cough, choking, chest tightness, shortness of breath and stridor. Cardiovascular: Negative for chest pain and palpitations. Gastrointestinal: Negative for abdominal pain, blood in stool, diarrhea and nausea. Endocrine: Negative for polydipsia, polyphagia and polyuria. Genitourinary: Negative for difficulty urinating, dysuria, frequency, hematuria, menstrual problem and vaginal discharge. PROT 6.9 05/03/2019    PROT 6.8 10/22/2012    BILITOT 0.9 05/03/2019    LABALBU 3.6 05/03/2019       CPK: No results found for: CKTOTAL  ESR: No results found for: SEDRATE  CRP: No results found for: CRP        Imaging: All pertinent images and reports for the current visit were reviewed by me during this visit. XR CHEST PORTABLE   Final Result   Small right basilar pleural effusion. Mild left basilar atelectasis. CT Chest Pulmonary Embolism W Contrast   Final Result   No evidence of an acute pulmonary embolus. Study was mildly motion degraded. Airspace disease in the right upper lobe posterior segment consistent with   pneumonia. There is suspicion of superimposed mild interstitial edema with trace   bilateral pleural fluid and mild to moderate cardiomegaly. Subacute displaced overlapping fracture of the mid body of the sternum. Clinical correlation recommended. CT HEAD WO CONTRAST   Final Result   1. No acute intracranial abnormality. 2. Age-appropriate atrophy with chronic small vessel ischemic white matter   disease. 3. Paranasal sinus disease, as detailed above. XR CHEST STANDARD (2 VW)   Final Result   No acute cardiopulmonary process. COPD. Medications: All current and past medications were reviewed.      sodium chloride  1 g Oral BID WC    pantoprazole  40 mg Oral QAM AC    guaiFENesin  600 mg Oral BID    iron sucrose  100 mg Intravenous Q24H    levofloxacin  750 mg Oral Every Other Day    lactobacillus  1 capsule Oral BID WC    urea  15 g Oral BID    rivaroxaban  15 mg Oral Daily    aspirin  324 mg Oral Once    levothyroxine  75 mcg Oral Daily    citalopram  10 mg Oral Daily    sodium chloride flush  10 mL Intravenous 2 times per day    cetirizine  5 mg Oral Nightly    albuterol  2.5 mg Nebulization Q4H WA           sodium chloride flush, magnesium hydroxide, ondansetron, potassium chloride, magnesium sulfate, acetaminophen    Current antibiotics: All antibiotics and their doses were reviewed by me today    Recent Abx Admin                   levofloxacin (LEVAQUIN) tablet 750 mg (mg) 750 mg Given 05/08/19 0802                Known drug Allergies: All allergies were reviewed and updated    No Known Allergies        Please note that this chart was generated using Dragon dictation software. Although every effort was made to ensure the accuracy of this automated transcription, some errors in transcription may have occurred inadvertently. If you may need any clarification, please do not hesitate to contact me through EPIC or at the phone number provided below with my electronic signature.     Madalyn Andino MD, MPH  5/8/2019, 10:50 AM  [facility] Infectious Disease   Office: [phone]  Fax: [phone]  Tuesday AM clinic:   [address] [place] [address] 120  Thursday AM [id number] [place], [facility]

## 2019-05-08 NOTE — PROGRESS NOTES
Occupational Therapy  Facility/Department: [address] NURSING  Daily Treatment Note  NAME: Ruth Mckenna  : 1925  MRN: [id number]    Date of Service: 2019    Discharge Recommendations: Ruth Mckenna scored a 10/24 on the AM-PAC ADL Inpatient form. Current research shows that an AM-PAC score of 17 or less is typically not associated with a discharge to the patient's home setting. Based on the patients AM-PAC score and their current ADL deficits, it is recommended that the patient have 3-5 sessions per week of Occupational Therapy at d/c to increase the patients independence. OT Equipment Recommendations  Equipment Needed: No    Assessment   Performance deficits / Impairments: Decreased functional mobility ; Decreased cognition;Decreased high-level IADLs;Decreased ADL status; Decreased endurance;Decreased coordination;Decreased balance;Decreased safe awareness  Treatment Diagnosis: decreasaed independence with ADL related to late affects of CHF  Prognosis: Good  Exam: ADL, mobility, transfers  Assistance / Modification: physical assistance  Patient Education: OT eval, transfers, orientation  REQUIRES OT FOLLOW UP: Yes  Activity Tolerance  Activity Tolerance: Treatment limited secondary to decreased cognition  Safety Devices  Safety Devices in place: Yes  Type of devices: All fall risk precautions in place; Chair alarm in place;Call light within reach; Left in chair;Nurse notified;Gait belt         Patient Diagnosis(es): The primary encounter diagnosis was Congestive heart failure, unspecified HF chronicity, unspecified heart failure type ([address] Diagnoses of Pneumonia due to organism, Hypoxia, Fall, initial encounter, and Elevated troponin were also pertinent to this visit. has a past medical history of Atrial fibrillation ([address] Cancer ([address] CHF (congestive heart failure) ([address] Hyperlipidemia, Hypertension, Seasonal allergies, and Thyroid disease.    has a past surgical history that includes eye surgery; Hysterectomy; and hip surgery (Left, 7-26-14). Restrictions  Restrictions/Precautions  Restrictions/Precautions: Fall Risk, Modified Diet, Swallowing - Thickened Liquids(HIGH FALL RISK; dysphagia II mechanically altered, nectar thick liquids, 1800 mL restriction)  Position Activity Restriction  Other position/activity restrictions: Justine Palomares is a 80 y.o. female who presents to the ED for shortness of breath. Patient reports she was at home and was walking to the bathroom and became weak and dizzy and decided to return but became too weak and collapsed to the ground. She denies head injury or loss of consciousness. She feels very short of breath. She denies any chest pain or fevers. Subjective   General  Chart Reviewed: Yes  Family / Caregiver Present: No  Diagnosis: CHF  Subjective  Subjective: Pt supine in bed, [place], agreeable to cotx with encouragement  General Comment  Comments: 80 y.o. female with PMHx of Atrial fibrillation, CHF, hyperlipidemia, hypertension and thyroid disease  presented to [facility] emergency room in which the patient states that today she was on her way to the bathroom. She began to feel weak and dizzy and collapsed to the floor. She denies losing consciousness. She denies hitting her head. She states \" I was just extremely short of breath\". Vital Signs  Patient Currently in Pain: Denies     Orientation  Orientation  Overall Orientation Status: Impaired  Orientation Level: Oriented to person;Oriented to place     Objective    ADL  Grooming: Stand by assistance;Setup(washing face)  UE Dressing: Minimal assistance(gown)        Balance  Sitting Balance: Moderate assistance  Standing Balance: Dependent/Total  Bed mobility  Rolling to Right: Moderate assistance  Supine to Sit: Maximum assistance(Max A to assume sitting as pt with handhold on bedrail resisting lateral motion into sitting)  Scooting:  Moderate assistance  Comment: mod/max A to maintain sittting EOB d/t confusion, attempts to return supine multiple times despite vc/tc to maintain sitting EOB  Transfers  Sit Pivot Transfers: Dependent/Total(max x2 EOB > recliner)  Sit to stand: 2 Person assistance  Stand to sit: 2 Person assistance      Cognition  Overall Cognitive Status: Exceptions  Arousal/Alertness: Inconsistent responses to stimuli  Following Commands: Follows one step commands with increased time; Follows one step commands with repetition  Attention Span: Attends with cues to redirect  Memory: Decreased recall of biographical Information;Decreased recall of recent events;Decreased short term memory;Decreased long term memory  Safety Judgement: Decreased awareness of need for safety  Problem Solving: Assistance required to generate solutions;Assistance required to implement solutions;Assistance required to correct errors made;Decreased awareness of errors  Insights: Decreased awareness of deficits  Initiation: Requires cues for all  Sequencing: Requires cues for all         Plan   Plan  Times per week: 3-5  Times per day: Daily  Current Treatment Recommendations: Safety Education & Training, Balance Training, Patient/Caregiver Education & Training, Self-Care / ADL, Functional Mobility Training, Neuromuscular Re-education, Endurance Training  G-Code     OutComes Score                                                  AM-PAC Score        AM-PAC Inpatient Daily Activity Raw Score: 10  AM-PAC Inpatient ADL T-Scale Score : 27.31  ADL Inpatient CMS 0-100% Score: 74.7  ADL Inpatient CMS G-Code Modifier : CL    Goals  Short term goals  Short term goal 1: min assist bathing from chair - washing face SBA/setup 5/8  Short term goal 2: min assist functional transfers - max x2 5/8  Short term goal 3: min assist toileting - not addressed 5/8  Short term goal 4: increase am pac score from 10 to 14 - 10, 5/8  Patient Goals   Patient goals : patient's unable to state son's goal is for her to go back to assisted living

## 2019-05-08 NOTE — PROGRESS NOTES
Physical Therapy  Facility/Department: [facility] 3A NURSING  Daily Treatment Note  NAME: Caryn Anaya  : 1925  MRN: [id number]    Date of Service: 2019    Discharge Recommendations: Caryn Anaya scored a 7/24 on the AM-PAC short mobility form. Current research shows that an AM-PAC score of 17 or less is typically not associated with a discharge to the patient's home setting. Based on the patients AM-PAC score and their current functional mobility deficits, it is recommended that the patient have 3-5 sessions per week of Physical Therapy at d/c to increase the patients independence. PT Equipment Recommendations  Equipment Needed: No    Patient Diagnosis(es): The primary encounter diagnosis was Congestive heart failure, unspecified HF chronicity, unspecified heart failure type ([address] Diagnoses of Pneumonia due to organism, Hypoxia, Fall, initial encounter, and Elevated troponin were also pertinent to this visit. has a past medical history of Atrial fibrillation ([address] Cancer ([address] CHF (congestive heart failure) ([address] Hyperlipidemia, Hypertension, Seasonal allergies, and Thyroid disease. has a past surgical history that includes eye surgery; Hysterectomy; and hip surgery (Left, 14). Restrictions  Restrictions/Precautions  Restrictions/Precautions: Fall Risk, Modified Diet, Swallowing - Thickened Liquids(HIGH FALL RISK; dysphagia II mechanically altered, nectar thick liquids, 1800 mL restriction)  Position Activity Restriction  Other position/activity restrictions: Caryn Anaya is a 80 y.o. female who presents to the ED for shortness of breath. Patient reports she was at home and was walking to the bathroom and became weak and dizzy and decided to return but became too weak and collapsed to the ground. She denies head injury or loss of consciousness. She feels very short of breath. She denies any chest pain or fevers.      Subjective   General  Chart Reviewed: Yes  Family / Caregiver Present: No  Subjective  Subjective: Pt agreeable to PT/OT session. Pt reporting no pain, and then stating \"it hurts in my back and tailbone\" but not providing pain rating. RN aware. General Comment  Comments: Pt supine in bed upon arrival on 3L O2. Pain Screening  Patient Currently in Pain: Yes  Pain Assessment  Pain Assessment: Faces  Dhaliwal-Baker Pain Rating: Hurts a little bit  Pain Location: Back; Coccyx  Vital Signs  Patient Currently in Pain: Yes       Orientation  Orientation  Overall Orientation Status: Impaired     Cognition   Cognition  Overall Cognitive Status: Exceptions  Arousal/Alertness: Inconsistent responses to stimuli  Following Commands: Follows one step commands with increased time; Follows one step commands with repetition  Attention Span: Attends with cues to redirect  Memory: Decreased recall of biographical Information;Decreased recall of recent events;Decreased short term memory;Decreased long term memory  Safety Judgement: Decreased awareness of need for safety  Problem Solving: Assistance required to generate solutions;Assistance required to implement solutions;Assistance required to correct errors made;Decreased awareness of errors  Insights: Decreased awareness of deficits  Initiation: Requires cues for all  Sequencing: Requires cues for all     Objective   Bed mobility  Rolling to Right: Moderate assistance  Supine to Sit: Maximum assistance(Max A to assume sitting as pt with handhold on bedrail resisting lateral motion into sitting)  Scooting: Moderate assistance  Comment: Mod/Max A to maintain sitting EOB due to confusion and attempting to return supine multiple times despite VC/TC to maintain sitting EOB. Transfers  Sit to Stand: Dependent/Total  Stand to sit: Dependent/Total  Squat Pivot Transfers: Dependent/Total  Comment: Max Ax2 for STS and pivot to chair from EOB. Max VC and TC for safe hand placement.  Pt scooting hips forward with posterior lean while sitting at EOB to prepare for transfer and pt required max VC for anterior shift to prevent slipping off EOB. Ambulation  Ambulation?: No  Stairs/Curb  Stairs?: No     Balance  Posture: Poor  Sitting - Static: Poor  Sitting - Dynamic: Poor(Mod/Max A sitting at EOB)  Standing - Static: Poor  Standing - Dynamic: Poor(Max Ax2 for transfers)            Assessment   Body structures, Functions, Activity limitations: Decreased functional mobility ; Decreased safe awareness;Decreased balance;Decreased ADL status; Decreased cognition;Decreased endurance;Decreased strength  Assessment: Pt continues to require mod/max A for bed mobility and max Ax2 for transfers this date. Pt continues to present with confusion and requires max VC/TC for safety during functional mobility. Pt would benefit from continued skilled PT services to address deficits. Treatment Diagnosis: impaired functional mobility  Prognosis: Good  Patient Education: POC, hand placement for transfers   Barriers to Learning: cognition  REQUIRES PT FOLLOW UP: Yes  Activity Tolerance  Activity Tolerance: Patient limited by cognitive status; Patient limited by endurance     G-Code     OutComes Score                                                  AM-PAC Score  AM-PAC Inpatient Mobility Raw Score : 7  AM-PAC Inpatient T-Scale Score : 26.42  Mobility Inpatient CMS 0-100% Score: 92.36  Mobility Inpatient CMS G-Code Modifier : CM          Goals  Short term goals  Time Frame for Short term goals: to be met by discharge  Short term goal 1: Pt to perform bed mobility with min A  Short term goal 2: Pt to perform STS transfers with CGA  Short term goal 3: Pt to ambulate with RW 25 ft with min A   Patient Goals   Patient goals : Did not state  NO GOALS MET THIS DATE    Plan    Plan  Times per week: 3-5x/wk   Current Treatment Recommendations: Strengthening, Transfer Training, Endurance Training, Balance Training, Gait Training, Functional Mobility Training, Cognitive/Perceptual Training, Cognitive Reorientation  Safety Devices  Type of devices:  All fall risk precautions in place, Patient at risk for falls, Call light within reach, Left in chair, Chair alarm in place, Nurse notified(PEEWEE Herman, aware)  Restraints  Initially in place: No     Therapy Time   Individual Concurrent Group Co-treatment   Time In 1418         Time Out 1456         Minutes 38              Timed Code Treatment Minutes: 38  Total Treatment Minutes:  AMINAH [address], [address] [facility]

## 2019-05-08 NOTE — PROGRESS NOTES
PM assessment completed. Pt repositioned and pulled up. Pt does have a blanchable red area at coccyx. Pt lethargic but arouses to voice. She was able to stay awake and take her medications and finish the Urea packet in thickened liquids. Pt tolerated without any obvious signs of coughing or choking. Pt has bed alarm on for safety and camera for added safety as Pt's mental status has been compromised due to low Na.

## 2019-05-08 NOTE — PROGRESS NOTES
Shift assessment completed, VSS, patient denies pain, alert to self and place. Camera in place for safety. Oxygen off patient upon arrival this AM. Patient repositioned, blanchable redness noted to coccyx, preventative mepilex in place. Due to patient's multiple attempts to climb out of bed, mattress overlay not placed on bed. Q2H turns. Bed in lowest position, bed alarm activated, call light and bedside table within reach. Pt. updated on POC, denies further questions. Will continue to monitor.     Yasemin Padilla

## 2019-05-08 NOTE — PROGRESS NOTES
Speech Language Pathology  Dysphagia Treatment Note    Name:  Tara Zhou  :   1925  Medical Diagnosis:  CHF (congestive heart failure), NYHA class I, acute on chronic, combined ([facility] [address] [I50.43]  CHF (congestive heart failure), NYHA class I, acute on chronic, combined ([facility] [address] [I50.43]  Treatment Diagnosis: Oropharyngeal Dysphagia  Pain level:  Pt did not report pain    Current Diet Level: Dysphagia II (Mechanical Soft) with NECTAR thick liquids, meds with puree or 1 at a time with nectar thick liquids  Tolerance of Current Diet Level: Per nurse report, Pt exhibited coughing with green beans, nectar thick liquids, and pills in applesauce earlier this date. Nurse requesting re-assessment of swallow function to ensure diet tolerance. Assessment of Texture Tolerance:  -Impressions: Pt positioned upright in recliner chair on 1.5L of O2 via nasal cannula upon arrival.  Pt willingly agreed to trials of thin liquids, nectar thick liquids, soft solids, and purees. Thin liquids via cup revealed suspected premature loss of bolus to pharynx and delayed swallow initiation. Pt initially tolerated first trial of thin liquid via cup with no overt clinical s/s of aspiration/penetration; however, additional trials revealed consistent delayed cough. Pt with anterior \"thrusting\" of tongue after drinks. When questioned about this, pt denied pain, but reported her tongue felt \"like it needs a drink. \"  Tongue appeared slightly red, but did not have white coating. Nectar thick liquids tolerated with improved bolus control and swallow initiation timing with no overt clinical s/s of aspiration/penetration. Soft peaches revealed coughing/choking during mastication apparently due to tongue \"thrusting\" while masticating. No further soft solids then provided. Puree textures revealed improved bolus control, A-P propulsion and oral clearance with no overt clinical s/s of aspiration/penetration.    Pt unable to recall current diet recommendations. Current oropharyngeal function demonstrates an apparent decline from prior dysphagia treatment. Therefore, recommend downgrade to Dysphagia I (puree) texture diet with NECTAR thick liquids; meds crushed in puree as able. Discussed pt's tongue with nursing. Diet and Treatment Recommendations:  1)Downgrade diet to Dysphagia I (puree) diet with NECTAR thick liquids  2)Discontinue thin water between meals due to noted symptoms of penetration/aspiration with thins  3)Meds crushed in puree as able    Dysphagia Goals:  1.) Pt will tolerate recommended diet without s/s of aspiration (ongoing 5/8/2019)   2.) Pt will tolerate MBS to r/o aspiration and determine appropriate diet/liquid level. (ongoing 5/8/2019)   3.) Pt will tolerate diet advance to least restricted diet, as clinically indicated, with no signs of aspiration  (ongoing 5/8/2019)     Cognitive Treatment/Goals:  Pt alert and oriented to self, place, month, and year. Disoriented to day and date. Short term recall of daily events and new information continues to be moderately impaired, and further contributes to confused state. Cognitive Goals:  1)Pt will improve orientation to x4, for improved awareness of surroundings and reduced confusion (Ongoing 5/8/19)  -Pt oriented to self, place, month, and year  -Disoriented to day and date    2)Pt will improve short term recall of daily events and newly learned information via graded tasks, to 80%, for improved safety with dysphagia recommendations. (Ongoing 5/8/19)  -Unable to recall diet recommendations or rationale for dysphagia tx  -Did not recall coughing during meal earlier this date    Plan:  Continued daily Dysphagia treatment with goals per plan of care. Patient/Family Education:Education given to the Pt and nurse, who verbalized understanding    Discharge Recommendations:  Pt will benefit from continued skilled Speech Therapy for Dysphagia services, prior to returning home.     Timed Code Treatment: 8 min    Total Treatment Time: 23 min    Angelena Angelucci M. A.  CCC-SLP S.P. [id number]  Speech-Language Pathologist

## 2019-05-08 NOTE — PROGRESS NOTES
Message sent through [address] [place] [address] about Pt's repeat Na level of 126. Awaiting response.

## 2019-05-09 LAB
ANION GAP SERPL CALCULATED.3IONS-SCNC: 8 MMOL/L (ref 3–16)
ANION GAP SERPL CALCULATED.3IONS-SCNC: 8 MMOL/L (ref 3–16)
BLOOD CULTURE, ROUTINE: NORMAL
BUN BLDV-MCNC: 33 MG/DL (ref 7–20)
BUN BLDV-MCNC: 36 MG/DL (ref 7–20)
CALCIUM SERPL-MCNC: 8.6 MG/DL (ref 8.3–10.6)
CALCIUM SERPL-MCNC: 8.6 MG/DL (ref 8.3–10.6)
CHLORIDE BLD-SCNC: 91 MMOL/L (ref 99–110)
CHLORIDE BLD-SCNC: 91 MMOL/L (ref 99–110)
CO2: 30 MMOL/L (ref 21–32)
CO2: 30 MMOL/L (ref 21–32)
CREAT SERPL-MCNC: 0.6 MG/DL (ref 0.6–1.2)
CREAT SERPL-MCNC: 0.7 MG/DL (ref 0.6–1.2)
CULTURE, BLOOD 2: NORMAL
GFR AFRICAN AMERICAN: >60
GFR AFRICAN AMERICAN: >60
GFR NON-AFRICAN AMERICAN: >60
GFR NON-AFRICAN AMERICAN: >60
GLUCOSE BLD-MCNC: 115 MG/DL (ref 70–99)
GLUCOSE BLD-MCNC: 121 MG/DL (ref 70–99)
HCT VFR BLD CALC: 26 % (ref 36–48)
HCT VFR BLD CALC: 27.4 % (ref 36–48)
HEMOGLOBIN: 8.3 G/DL (ref 12–16)
HEMOGLOBIN: 8.8 G/DL (ref 12–16)
POTASSIUM SERPL-SCNC: 4 MMOL/L (ref 3.5–5.1)
POTASSIUM SERPL-SCNC: 4.2 MMOL/L (ref 3.5–5.1)
PRO-BNP: 2351 PG/ML (ref 0–449)
SODIUM BLD-SCNC: 129 MMOL/L (ref 136–145)
SODIUM BLD-SCNC: 129 MMOL/L (ref 136–145)

## 2019-05-09 PROCEDURE — 6370000000 HC RX 637 (ALT 250 FOR IP): Performed by: INTERNAL MEDICINE

## 2019-05-09 PROCEDURE — 6360000002 HC RX W HCPCS: Performed by: NURSE PRACTITIONER

## 2019-05-09 PROCEDURE — 94640 AIRWAY INHALATION TREATMENT: CPT

## 2019-05-09 PROCEDURE — 36415 COLL VENOUS BLD VENIPUNCTURE: CPT

## 2019-05-09 PROCEDURE — 1200000000 HC SEMI PRIVATE

## 2019-05-09 PROCEDURE — 97127 HC SP THER IVNTJ W/FOCUS COG FUNCJ: CPT

## 2019-05-09 PROCEDURE — 2700000000 HC OXYGEN THERAPY PER DAY

## 2019-05-09 PROCEDURE — 83880 ASSAY OF NATRIURETIC PEPTIDE: CPT

## 2019-05-09 PROCEDURE — 92526 ORAL FUNCTION THERAPY: CPT

## 2019-05-09 PROCEDURE — 6370000000 HC RX 637 (ALT 250 FOR IP): Performed by: FAMILY MEDICINE

## 2019-05-09 PROCEDURE — 85018 HEMOGLOBIN: CPT

## 2019-05-09 PROCEDURE — 94669 MECHANICAL CHEST WALL OSCILL: CPT

## 2019-05-09 PROCEDURE — 94760 N-INVAS EAR/PLS OXIMETRY 1: CPT

## 2019-05-09 PROCEDURE — 6370000000 HC RX 637 (ALT 250 FOR IP): Performed by: NURSE PRACTITIONER

## 2019-05-09 PROCEDURE — 80048 BASIC METABOLIC PNL TOTAL CA: CPT

## 2019-05-09 PROCEDURE — 85014 HEMATOCRIT: CPT

## 2019-05-09 PROCEDURE — 2580000003 HC RX 258: Performed by: NURSE PRACTITIONER

## 2019-05-09 RX ORDER — POLYETHYLENE GLYCOL 3350 17 G/17G
17 POWDER, FOR SOLUTION ORAL
Status: DISCONTINUED | OUTPATIENT
Start: 2019-05-09 | End: 2019-05-11 | Stop reason: HOSPADM

## 2019-05-09 RX ORDER — GUAIFENESIN 100 MG/5ML
200 SOLUTION ORAL EVERY 6 HOURS
Status: DISCONTINUED | OUTPATIENT
Start: 2019-05-09 | End: 2019-05-11 | Stop reason: HOSPADM

## 2019-05-09 RX ORDER — SENNA AND DOCUSATE SODIUM 50; 8.6 MG/1; MG/1
2 TABLET, FILM COATED ORAL
Status: DISCONTINUED | OUTPATIENT
Start: 2019-05-09 | End: 2019-05-11 | Stop reason: HOSPADM

## 2019-05-09 RX ORDER — FUROSEMIDE 20 MG/1
20 TABLET ORAL ONCE
Status: COMPLETED | OUTPATIENT
Start: 2019-05-09 | End: 2019-05-09

## 2019-05-09 RX ORDER — FERROUS SULFATE 325(65) MG
325 TABLET ORAL 2 TIMES DAILY WITH MEALS
Status: DISCONTINUED | OUTPATIENT
Start: 2019-05-09 | End: 2019-05-11 | Stop reason: HOSPADM

## 2019-05-09 RX ADMIN — ACETAMINOPHEN 650 MG: 325 TABLET, FILM COATED ORAL at 10:34

## 2019-05-09 RX ADMIN — SENNOSIDES AND DOCUSATE SODIUM 2 TABLET: 8.6; 5 TABLET ORAL at 15:23

## 2019-05-09 RX ADMIN — FUROSEMIDE 20 MG: 20 TABLET ORAL at 10:34

## 2019-05-09 RX ADMIN — FERROUS SULFATE TAB 325 MG (65 MG ELEMENTAL FE) 325 MG: 325 (65 FE) TAB at 16:52

## 2019-05-09 RX ADMIN — POLYETHYLENE GLYCOL 3350 17 G: 17 POWDER, FOR SOLUTION ORAL at 15:23

## 2019-05-09 RX ADMIN — GUAIFENESIN 600 MG: 600 TABLET, EXTENDED RELEASE ORAL at 08:09

## 2019-05-09 RX ADMIN — RIVAROXABAN 15 MG: 15 TABLET, FILM COATED ORAL at 08:09

## 2019-05-09 RX ADMIN — Medication 10 ML: at 20:41

## 2019-05-09 RX ADMIN — SODIUM CHLORIDE TAB 1 GM 1 G: 1 TAB at 16:52

## 2019-05-09 RX ADMIN — GUAIFENESIN 200 MG: 100 SOLUTION ORAL at 22:17

## 2019-05-09 RX ADMIN — ACETAMINOPHEN 650 MG: 325 TABLET, FILM COATED ORAL at 20:42

## 2019-05-09 RX ADMIN — Medication 1 CAPSULE: at 16:52

## 2019-05-09 RX ADMIN — Medication 15 G: at 20:41

## 2019-05-09 RX ADMIN — LEVOTHYROXINE SODIUM 75 MCG: 75 TABLET ORAL at 06:08

## 2019-05-09 RX ADMIN — SODIUM CHLORIDE TAB 1 GM 1 G: 1 TAB at 08:09

## 2019-05-09 RX ADMIN — CITALOPRAM HYDROBROMIDE 10 MG: 20 TABLET ORAL at 08:09

## 2019-05-09 RX ADMIN — ALBUTEROL SULFATE 2.5 MG: 2.5 SOLUTION RESPIRATORY (INHALATION) at 08:03

## 2019-05-09 RX ADMIN — Medication 15 G: at 09:21

## 2019-05-09 RX ADMIN — Medication 10 ML: at 08:09

## 2019-05-09 RX ADMIN — CETIRIZINE HYDROCHLORIDE 5 MG: 10 TABLET, FILM COATED ORAL at 20:41

## 2019-05-09 RX ADMIN — PANTOPRAZOLE SODIUM 40 MG: 40 TABLET, DELAYED RELEASE ORAL at 06:08

## 2019-05-09 RX ADMIN — Medication 1 CAPSULE: at 08:09

## 2019-05-09 ASSESSMENT — PAIN DESCRIPTION - DESCRIPTORS: DESCRIPTORS: PATIENT UNABLE TO DESCRIBE

## 2019-05-09 ASSESSMENT — PAIN DESCRIPTION - PAIN TYPE
TYPE: CHRONIC PAIN
TYPE: ACUTE PAIN

## 2019-05-09 ASSESSMENT — PAIN SCALES - GENERAL
PAINLEVEL_OUTOF10: 8
PAINLEVEL_OUTOF10: 0
PAINLEVEL_OUTOF10: 3
PAINLEVEL_OUTOF10: 3
PAINLEVEL_OUTOF10: 0
PAINLEVEL_OUTOF10: 0

## 2019-05-09 ASSESSMENT — PAIN DESCRIPTION - LOCATION
LOCATION: HIP
LOCATION: HIP

## 2019-05-09 ASSESSMENT — PAIN DESCRIPTION - ORIENTATION
ORIENTATION: LEFT
ORIENTATION: LEFT

## 2019-05-09 NOTE — PROGRESS NOTES
Speech Language Pathology  Dysphagia Treatment Note    Name:  Josep Joe  :   1925  Medical Diagnosis:  CHF (congestive heart failure), NYHA class I, acute on chronic, combined ([facility] [address] [I50.43]  CHF (congestive heart failure), NYHA class I, acute on chronic, combined ([facility] [address] [I50.43]  Treatment Diagnosis: Oropharyngeal Dysphagia  Pain level:  Pt did not report pain    Current Diet Level: 1)Downgrade diet to Dysphagia I (puree) diet with NECTAR thick liquids  2)Discontinue thin water between meals due to noted symptoms of penetration/aspiration with thins  3)Meds crushed in puree as able  Tolerance of Current Diet Level: Per chart review, no documented difficulties with current diet level noted. Assessment of Texture Tolerance:  -Impressions: Pt was positioned upright in bed. Initially pt was found with O2 nasal cannula off. O2 was replaced. Lunch tray of puree and nectar thick liquids were set up for pt. Pt was able to self feed. Dysphagia was characterized by decreased A-P propulsion, suspected pharyngeal pooling and delayed swallow initiation. Thin liquids continue to reveal delayed coughing which could be indicative of penetration/aspiration. Tongue \"thrusting\" was not noted this date. Recommend continuation of current diet level with ongoing assessment of swallow and diet advance as indicated.      Diet and Treatment Recommendations:  1)Downgrade diet to Dysphagia I (puree) diet with NECTAR thick liquids  2)Discontinue thin water between meals due to noted symptoms of penetration/aspiration with thins  3)Meds crushed in puree as able    Dysphagia Goals:  1.) Pt will tolerate recommended diet without s/s of aspiration (ongoing 2019)   2.) Pt will tolerate MBS to r/o aspiration and determine appropriate diet/liquid level. (ongoing 2019)   3.) Pt will tolerate diet advance to least restricted diet, as clinically indicated, with no signs of aspiration  (ongoing 2019)     Cognitive Treatment/Goals:  Pt alert and oriented to self, place, month, and year. Disoriented to day and date. Short term recall of daily events and new information continues to be moderately impaired, and further contributes to confused state. Cognitive Goals:  1)Pt will improve orientation to x4, for improved awareness of surroundings and reduced confusion (Ongoing 5/9/19)  -Oriented to person and type of place only   -Unable to recall temporal concepts    2)Pt will improve short term recall of daily events and newly learned information via graded tasks, to 80%, for improved safety with dysphagia recommendations. (Ongoing 5/9/19)  -Unable to recall diet modifications or rationale for modifications. Poor carryover was noted post 5 minute delay  -Limited carryover of compensatory strategies was noted. Plan:  Continued daily Dysphagia treatment with goals per plan of care. Patient/Family Education:Education given to the Pt and nurse, who verbalized understanding    Discharge Recommendations:  Pt will benefit from continued skilled Speech Therapy for Dysphagia services, prior to returning home.     Timed Code Treatment: 8 min    Total Treatment Time: 23 min    Alee Maldonado, [address]  Speech Language Pathologist

## 2019-05-09 NOTE — CARE COORDINATION
SW attempted to meet with pt to discuss discharge plans. Pt repeats, \"I just don't know, I just want to go home. \"  Pt seems overwhelmed, confused and lethargic and unable to discharge plan. SW spoke with bedside nurse and encouraged palliative care consult, as son indicated with SW that he fears pt will make major decline in cognition if she goes to a new environment. He reports father passed away at [facility] with hospice/comfort care years prior and he would hope the same for his mother.     Heather Tellez MSW, [address]

## 2019-05-09 NOTE — CARE COORDINATION
SW spoke with pt's son regarding options for discharge. Son states he feels that because patient was doing so well a week prior to hospital admission, he fears that if he does not go the SNF route for her, he would be \"giving up on her. \"  SW states that palliative care will also reach out for additional information but SW can start looking in to SNF options for discharge before pt goes back to [place]. Referral to [facility] per son request.  Trace Hoover spoke to Roise Zhou at [facility], who is looking at referral but states she is unsure if she has bed available until next week. SW to follow.     Yolanda Gage MSW, [address]

## 2019-05-09 NOTE — PROGRESS NOTES
Hospital Medicine Progress Note     Date:  5/9/2019    PCP: Nirali Robert MD (Tel: [phone])    Date of Admission: 5/3/2019      Brief hospital course: Pt presented to the ER after feeling weak and dizzy while she was on the way to the bathroom and ended collapsing to the floor, denies LOC, palpitations, head trauma and states she fell because she was SOB and weak. Subjective  Lying comfortably in bed, no new complaints. Objective  Physical exam:  Vitals: BP (!) 119/48   Pulse 66   Temp 97.4 °F (36.3 °C) (Temporal)   Resp 18   Ht 5' 2\" (1.575 m)   Wt 116 lb 1.6 oz (52.7 kg)   SpO2 98%   BMI 21.23 kg/m²   Gen: Not in distress. Alert. Head: Normocephalic. Atraumatic. Eyes: EOMI. Good acuity. ENT: Oral mucosa moist  Neck: No JVD. No obvious thyromegaly. CVS: Nml S1S2, no MRG, RRR  Pulmomary: Clear bilaterally. No crackles. No wheezes. Gastrointestinal: Soft, NT/ND. Positive bowel sounds. Musculoskeletal: No edema. Warm  Neuro: No focal deficit. Moves extremity spontaneously. Psychiatry: Appropriate affect. Not agitated. , pleasantly demented  Skin: Warm, dry with normal turgor. No rash      24HR INTAKE/OUTPUT:      Intake/Output Summary (Last 24 hours) at 5/9/2019 1343  Last data filed at 5/9/2019 1145  Gross per 24 hour   Intake 360 ml   Output 800 ml   Net -440 ml     I/O last 3 completed shifts:   In: 240 [P.O.:240]  Out: 550 [Drains:550]  I/O this shift:  In: 120 [P.O.:120]  Out: 250 [Drains:250]      Meds:    sodium chloride  1 g Oral BID WC    pantoprazole  40 mg Oral QAM AC    guaiFENesin  600 mg Oral BID    levofloxacin  750 mg Oral Every Other Day    lactobacillus  1 capsule Oral BID WC    urea  15 g Oral BID    rivaroxaban  15 mg Oral Daily    aspirin  324 mg Oral Once    levothyroxine  75 mcg Oral Daily    citalopram  10 mg Oral Daily    sodium chloride flush  10 mL Intravenous 2 times per day    cetirizine  5 mg Oral Nightly    albuterol  2.5 mg Nebulization Q4H WA       Infusions:       PRN Meds: sodium chloride flush, magnesium hydroxide, ondansetron, potassium chloride, magnesium sulfate, acetaminophen    Labs/imaging:  CBC:   Recent Labs     05/07/19  0551 05/08/19  0423 05/09/19  0253   WBC 8.1 9.0  --    HGB 8.6* 8.8* 8.3*    264  --          BMP:    Recent Labs     05/08/19  0849 05/08/19  2043 05/09/19  0252   * 128* 129*   K 4.3 4.3 4.0   CL 87* 90* 91*   CO2 29 29 30   BUN 43* 38* 36*   CREATININE 0.6 0.6 0.6   GLUCOSE 123* 135* 121*         Hepatic: No results for input(s): AST, ALT, ALB, BILITOT, ALKPHOS in the last 72 hours. Troponin:   No results for input(s): TROPONINI in the last 72 hours. BNP: No results for input(s): BNP in the last 72 hours. INR: No results for input(s): INR in the last 72 hours. Reviewed imaging and reports noted      Assessment:  Active Problems:    A-fib (HCC)    HTN (hypertension)    CHF (congestive heart failure), NYHA class I, acute on chronic, combined (HCC)    Hyponatremia    Elevated troponin    Community acquired bacterial pneumonia    Hypoxemia    Congestive heart failure (HCC)    Hypoxia    Pneumonia due to organism    Acute respiratory failure with hypoxia ([facility] [address]    Fall    TRENT (acute kidney injury) ([facility] [address]    Metabolic alkalosis  Resolved Problems:    * No resolved hospital problems.  *        Plan:  Hyponatremia  - Nephro following  - cont urea and sodium chloride tabs, improving  - Goal is to increase 10 - 12 mEq/24hrs      CAP - RUL  - Pulm evaluated and s/o and ID following, abx per ID  - mucinex, cont IS      Acute Resp Failure w/ Hypoxia  - 2/2 above, CXR pending  - Pulm evaluated and s/o, maintain O2 sats > 88%      Acute Metabolic Encephalopathy  - 2/2 above, improving, cont to treat above      Acute dCHF   - improved, ECHO revealed EF 55%, indeterminate diastolic function, LVH, mod tricuspid regurg  - cardio evaluated and s/o  - has received IV lasix       PAF  - on xarelto for [facility]      KEEGAN  - got venofer, restart ferrous sulfate      Hypothyroidism  - cont synthroid    Dispo: At request of son and POA, Palliative Care will discuss with son about possibly returning to [facility]. Diet: DIET DYSPHAGIA I PUREED;  Nectar Thick; Daily Fluid Restriction: 1800 ml; Cardiac, No Drinking Straw    Activity: up with assist  Prophylaxis: xarelto    Code status: DNR-CC     ----------        Darryl Sánchez MD  -------------------------------  Rounding hospitalist

## 2019-05-09 NOTE — DISCHARGE INSTR - COC
Continuity of Care Form  CHF Pathway  _x_ Cardiovascular Assessment.  Titrate O2 to keep SaO2 greater than 90%    _x_ Daily Weights- Baseline Wt:***   Call MD if:   3 pound weight gain or loss in one day OR 5 pound weight gain in one week       _x_No added salt diet (3-4 G sodium) and 64 oz fluid restriction        Patient Name: Erica Benjamin   :  1925  MRN:  [id number]    Admit date:  5/3/2019  Discharge date:  19    Code Status Order: [facility]   Advance Directives:   [address] Directive Type of Healthcare Directive Copy in [address] Agent's Name Healthcare Agent's Phone Number    [phone]  Yes, patient has an advance directive for healthcare treatment  Durable power of  for health care;Living will  No, copy requested from family  Healthcare power of   zelda harrell   --          Admitting Physician:  Rosario Antonio MD  PCP: Dash Barnes MD    Discharging Nurse: [address] [place] [address] Unit/Room#: 3AN-3312/3312-01  Discharging Unit Phone Number: [phone]    Emergency Contact:   Extended Emergency Contact Information  Primary Emergency Contact: [address] [place] [address] of [address] [place] [address] Phone: [phone]  Mobile Phone: [phone]  Relation: Child    Past Surgical History:  Past Surgical History:   Procedure Laterality Date    EYE SURGERY      HIP SURGERY Left 7-14    LEFT HIP HEMIARTHROPLASTY                 HYSTERECTOMY         Immunization History:   Immunization History   Administered Date(s) Administered    Influenza Virus Vaccine 10/24/2011, 10/22/2012, 10/21/2013    Influenza, High Dose (Fluzone 65 yrs and older) 2014, 10/12/2015, 10/11/2016, 10/03/2017, 10/18/2018    Pneumococcal 13-valent Conjugate (Rzmirkw23) 10/12/2015    Pneumococcal Polysaccharide ([id number]) 2017       Active Problems:  Patient Active Problem List   Diagnosis Code  Other specified acquired hypothyroidism E03.8    A-fib ([facility]) I48.91    HTN (hypertension) I10    Hip fracture, left ([facility] [address] S72.002A    CHF (congestive heart failure), NYHA class I, acute on chronic, combined ([facility]) I50.43    Hyponatremia E87.1    Elevated troponin R74.8    Community acquired bacterial pneumonia J15.9    Hypoxemia R09.02    Congestive heart failure ([facility]) I50.9    Hypoxia R09.02    Pneumonia due to organism J18.9    Acute respiratory failure with hypoxia ([facility]) J96.01    Fall W19. Loyde Hail    TRENT (acute kidney injury) ([facility] [address] D76.5    Metabolic alkalosis Q48.2       Isolation/Infection:   Isolation          No Isolation            Nurse Assessment:  Last Vital Signs: BP (!) 119/48   Pulse 66   Temp 97.4 °F (36.3 °C) (Temporal)   Resp 18   Ht 5' 2\" (1.575 m)   Wt 116 lb 1.6 oz (52.7 kg)   SpO2 98%   BMI 21.23 kg/m²     Last documented pain score (0-10 scale): Pain Level: 0  Last Weight:   Wt Readings from Last 1 Encounters:   05/09/19 116 lb 1.6 oz (52.7 kg)     Mental Status:  disoriented, alert and able to concentrate and follow conversation    IV Access:  - None    Nursing Mobility/ADLs:  Walking   Assisted  Transfer  Assisted  Bathing  Assisted  Dressing  Assisted  Toileting  Assisted  Feeding  Assisted  Med Admin  Assisted  Med Delivery   crushed and prefers mixed with apple sauce    Wound Care Documentation and Therapy:  Incision 07/26/14 Hip Left (Active)   Number of days: 6732        Elimination:  Continence:   · Bowel: No  · Bladder: No  Urinary Catheter: Removal Date 5/10/19   Colostomy/Ileostomy/Ileal Conduit: No       Date of Last BM: 5/10/19    Intake/Output Summary (Last 24 hours) at 5/9/2019 1436  Last data filed at 5/9/2019 1145  Gross per 24 hour   Intake 360 ml   Output 800 ml   Net -440 ml     I/O last 3 completed shifts: In: 240 [P.O.:240]  Out: 550 [Drains:550]    Safety Concerns:      At Risk for Falls    Impairments/Disabilities:      Hearing    Nutrition Therapy:  Current Nutrition Therapy:   - Oral Diet:  Dysphagia 2 mechanically altered    Routes of Feeding: Oral  Liquids: Nectar Thick Liquids  Daily Fluid Restriction: yes - amount 1800  Last Modified Barium Swallow with Video (Video Swallowing Test): not done    Treatments at the Time of Hospital Discharge:   Respiratory Treatments: none  Oxygen Therapy:  is on oxygen at 2 L/min per nasal cannula. Ventilator:    - No ventilator support    Rehab Therapies: Physical Therapy, Occupational Therapy and Speech/Language Therapy  Weight Bearing Status/Restrictions: No weight bearing restirctions  Other Medical Equipment (for information only, NOT a DME order):  walker  Other Treatments: none    Patient's personal belongings (please select all that are sent with patient):  Hearing Aides bilateral, watch    RN SIGNATURE:  Electronically signed by Lilliana Reynolds RN on 5/10/19 at 3:25 PM    CASE MANAGEMENT/SOCIAL WORK SECTION  Inpatient Status Date: 5/3/19    Readmission Risk Assessment Score:  Readmission Risk              Risk of Unplanned Readmission:        14           Discharging to Facility/ Agency   · Name: [facility] Sol[facility]  [address] [place] [address]        Phone: [phone]       Fax: [phone]        / signature: Electronically signed by JAIMIE Ambriz, DORYS on 5/11/19 at 9:40 AM    PHYSICIAN SECTION    Prognosis: Fair    Condition at Discharge: Stable    Rehab Potential (if transferring to Rehab): Fair    Recommended Labs or Other Treatments After Discharge: PT/OT, SLP, f/u w/ PCP in 1 week    Physician Certification: I certify the above information and transfer of Caryn Anaya  is necessary for the continuing treatment of the diagnosis listed and that she requires [facility] for less 30 days.      Update Admission H&P: No change in H&P    PHYSICIAN SIGNATURE:  Electronically signed by Lex Cooks, MD on 5/10/19 at 2:23 PM

## 2019-05-09 NOTE — PROGRESS NOTES
Nephrology Note        Chief Complaint:    Chief Complaint   Patient presents with    Shortness of Breath     does not wear O2, states no HX with O2 problems, 81% on RA at SNF, is on a nonrebreather         History of Present iIlness:    Bernice Novak is a 80 y.o. female with h/o a . Fib, CHF, HTN was admitted with generalized weakness. She began to feel weak and dizzy and collapsed to the floor. She denies losing consciousness. She denies hitting her head. She states \" I was just extremely short of breath\". Patient arrived by EMS and it was stated she was 81% on room air at the [facility] nursing facility. Patient was placed on a nonrebreather satting 93%. Patient does not use oxygen at home. Yesterday she has very low urine output   On 3 L oxygen per NC now     Gave her lasix 40 mg iv x 1 yesterday   Sodium level low at 123   Has poor oral intake     Interval HX     Sodium level improved from 118 ---> 122 ---> 124 --.  126 ---> 129   Denies any SOB   awake and alert   Responds to simple commands       Past Medical History:   Diagnosis Date    Atrial fibrillation (HCC)     Cancer (HCC)     breast cancer     CHF (congestive heart failure) (HCC)     Hyperlipidemia     Hypertension     Seasonal allergies     Thyroid disease        Past Surgical History:   Procedure Laterality Date    EYE SURGERY      HIP SURGERY Left 7-26-14    LEFT HIP HEMIARTHROPLASTY                 HYSTERECTOMY           Current Medications:      sodium chloride tablet 1 g BID WC   pantoprazole (PROTONIX) tablet 40 mg QAM AC   guaiFENesin (MUCINEX) extended release tablet 600 mg BID   levofloxacin (LEVAQUIN) tablet 750 mg Every Other Day   lactobacillus (CULTURELLE) capsule 1 capsule BID WC   urea (URE-NA) packet 15 g BID   rivaroxaban (XARELTO) tablet 15 mg Daily   aspirin chewable tablet 324 mg Once   levothyroxine (SYNTHROID) tablet 75 mcg Daily   citalopram (CELEXA) tablet 10 mg Daily   sodium chloride flush 0.9 % injection 10 mL 2 times per day   sodium chloride flush 0.9 % injection 10 mL PRN   magnesium hydroxide (MILK OF MAGNESIA) 400 MG/5ML suspension 30 mL Daily PRN   ondansetron (ZOFRAN) injection 4 mg Q6H PRN   potassium chloride 10 mEq/100 mL IVPB (Peripheral Line) PRN   magnesium sulfate 1 g in dextrose 5% 100 mL IVPB PRN   acetaminophen (TYLENOL) tablet 650 mg Q4H PRN   cetirizine (ZYRTEC) tablet 5 mg Nightly   albuterol (PROVENTIL) nebulizer solution 2.5 mg Q4H WA         Physical exam:     Vitals:  BP (!) 160/62   Pulse 71   Temp 97.4 °F (36.3 °C) (Temporal)   Resp 18   Ht 5' 2\" (1.575 m)   Wt 116 lb 1.6 oz (52.7 kg)   SpO2 96%   BMI 21.23 kg/m²   Constitutional:  OAA X3 NAD  Skin: no rash, turgor wnl  Heent:  eomi, mmm  Neck: no bruits or jvd noted  Cardiovascular:  S1, S2 without m/r/g  Respiratory: Rales +  Abdomen:  +bs, soft, nt, nd  Ext: no lower extremity edema  Psychiatric: mood and affect appropriate  Musculoskeletal:  Rom, muscular strength intact    Labs:  CBC:   Recent Labs     05/07/19  0551 05/08/19 0423 05/09/19  0253   WBC 8.1 9.0  --    HGB 8.6* 8.8* 8.3*    264  --      BMP:    Recent Labs     05/08/19  0849 05/08/19 2043 05/09/19  0252   * 128* 129*   K 4.3 4.3 4.0   CL 87* 90* 91*   CO2 29 29 30   BUN 43* 38* 36*   CREATININE 0.6 0.6 0.6   GLUCOSE 123* 135* 121*     Ca/Mg/Phos:   Recent Labs     05/07/19  0551  05/08/19  0423 05/08/19  0849 05/08/19 2043 05/09/19  0252   CALCIUM 8.7   < > 9.1 8.8 8.7 8.6   MG 1.90  --  2.20  --   --   --     < > = values in this interval not displayed. Hepatic:   No results for input(s): AST, ALT, ALB, BILITOT, ALKPHOS in the last 72 hours. Troponin:   No results for input(s): TROPONINI in the last 72 hours. BNP: No results for input(s): BNP in the last 72 hours. Lipids:   No results for input(s): CHOL, TRIG, HDL, LDLCALC, LABVLDL in the last 72 hours. ABGs: No results for input(s): PHART, PO2ART, TZK0SGE in the last 72 hours.   INR: No results for input(s): INR in the last 72 hours. UA:No results for input(s): Nabila Phy, GLUCOSEU, BILIRUBINUR, Hager Diver, BLOODU, PHUR, PROTEINU, UROBILINOGEN, NITRU, LEUKOCYTESUR, LABMICR, URINETYPE in the last 72 hours. Urine Microscopic: No results for input(s): LABCAST, BACTERIA, COMU, HYALCAST, WBCUA, RBCUA, EPIU in the last 72 hours. Urine Culture: No results for input(s): LABURIN in the last 72 hours. Urine Chemistry:   No results for input(s): Mir Kuster, PROTEINUR, NAUR in the last 72 hours. IMAGING:  XR CHEST PORTABLE   Final Result   Small right basilar pleural effusion. Mild left basilar atelectasis. CT Chest Pulmonary Embolism W Contrast   Final Result   No evidence of an acute pulmonary embolus. Study was mildly motion degraded. Airspace disease in the right upper lobe posterior segment consistent with   pneumonia. There is suspicion of superimposed mild interstitial edema with trace   bilateral pleural fluid and mild to moderate cardiomegaly. Subacute displaced overlapping fracture of the mid body of the sternum. Clinical correlation recommended. CT HEAD WO CONTRAST   Final Result   1. No acute intracranial abnormality. 2. Age-appropriate atrophy with chronic small vessel ischemic white matter   disease. 3. Paranasal sinus disease, as detailed above. XR CHEST STANDARD (2 VW)   Final Result   No acute cardiopulmonary process. COPD. I/O last 3 completed shifts: In: 240 [P.O.:240]  Out: 550 [Drains:550]          Assessment/Plan :      1. Hyponatremia. Improving     Low solute intake   Poor oral intake  on sodium chloride tablets   On urea pack 15 gram po bid   Check sodium levels every 12 hrs     2. Urine output good now   Lasix as needed  ECHO results reviewed     3. Anemia. Has iron deficiency anemia. Got  iv venofer     4. Acid- base disorder.  Improving                       Thank you for allowing us to participate in care of Gema Berg         Electronically signed by: Michi Mcdermott MD, 5/9/2019, 9:11 AM      [facility] [address]  Office : [phone]  Fax :[phone]

## 2019-05-09 NOTE — PLAN OF CARE
Problem: Falls - Risk of:  Goal: Will remain free from falls  Description  Will remain free from falls  5/9/2019 0930 by Myles Santos RN  Outcome: Ongoing  Note:   Fall precautions in place, bed alarm on, nonskid foot wear applied, bed in lowest position, and call light within reach. Will continue to monitor. Problem: Risk for Impaired Skin Integrity  Goal: Tissue integrity - skin and mucous membranes  Description  Structural intactness and normal physiological function of skin and  mucous membranes. 5/9/2019 0930 by Myles Santos RN  Outcome: Ongoing  Note:   Patient assisted with turning at least every 2 hours, or found to be in a different position. Skin assessed for breakdown.

## 2019-05-09 NOTE — CONSULTS
Venkat Leal [facility] 112 5/5/1925    History:  Past Medical History:   Diagnosis Date    [facility]     Cancer ([facility]     breast cancer     CHF (congestive heart failure) (HCC)     Hyperlipidemia     Hypertension     Seasonal allergies     Thyroid disease        ECHO:   Summary   -Normal left ventricle size and systolic function with an estimated ejection   fraction of 55%. There is concentric left ventricular hypertrophy.   Indeterminate diastolic function.   -Mitral annular calcification is present. Calcification of the mitral valve   noted. Mild to moderate mitral regurgitation.   -The left atrium is mildly dilated.   -The aortic valve appears sclerotic but opens well. Mild aortic   regurgitation.   -Moderate tricuspid regurgitation with PASP of 45 mmHg.   -The right atrial size is mildly dilated.   -Trivial pulmonic regurgitation present.   -There is a trivial pericardial effusion noted.         History of sleep apnea: no   [place] Screen ordered: no- confused/ poss palliative care discussion      Last Hospital Admission: 7/25/14 with left hip fracture and repair  Code Status: DNR CC  Discharge plans: to be determined. Palliative care consult in place-- ? Hospice vs poss SNF placement    Family Present: jude    Daniel Pedersen was admitted to the hospital with increased shortness of breath and hypoxemia. Patient is from assisted living at [facility]. Patient oriented to self. Unable to tell me if she has  history of HF or if she is weighed daily. She did tell me she is in a facility. Family not at bedside. It does not appear that she follows with cardiology per her EMR. Medications administered by nursing at assisted living facility. She eats in cafeteria. Currently palliative care consult in place. Per social work there is a possibility of her discharging with palliative care to facility vs snf. Left handout on HF education at bedside for family. Unable to agree to lifestyle change at this time. PATIENT/CAREGIVER TEACHING:    Level of patient/caregiver understanding able to:   [ ] Verbalize understanding [ ] Demonstrate understanding [ ] Teach back   [ ] Needs reinforcement [x ] Other: unable to retain education at this time      Time spent teaching: 10 mins attempted    1. WEIGHT: Admit Weight: 117 lb (53.1 kg)      Today  Weight: 116 lb 1.6 oz (52.7 kg)   2. I/O     Intake/Output Summary (Last 24 hours) at 5/9/2019 1421  Last data filed at 5/9/2019 1145  Gross per 24 hour   Intake 360 ml   Output 800 ml   Net -440 ml       Recommendations:   1. Will be available for patient's family for questions and education. Please have them call [phone] if they do have questions  2. CHF pathway of ambrosio if discharges to snf vs hospice  3. Continue to educate on S/S.   4. Provided patient with CHF Resource Line for questions and concerns.        JUNIOR GILL 5/9/2019 2:21 PM

## 2019-05-10 LAB
ANION GAP SERPL CALCULATED.3IONS-SCNC: 10 MMOL/L (ref 3–16)
BUN BLDV-MCNC: 30 MG/DL (ref 7–20)
CALCIUM SERPL-MCNC: 8.8 MG/DL (ref 8.3–10.6)
CHLORIDE BLD-SCNC: 94 MMOL/L (ref 99–110)
CO2: 30 MMOL/L (ref 21–32)
CREAT SERPL-MCNC: 0.6 MG/DL (ref 0.6–1.2)
GFR AFRICAN AMERICAN: >60
GFR NON-AFRICAN AMERICAN: >60
GLUCOSE BLD-MCNC: 98 MG/DL (ref 70–99)
POTASSIUM SERPL-SCNC: 4 MMOL/L (ref 3.5–5.1)
SODIUM BLD-SCNC: 134 MMOL/L (ref 136–145)

## 2019-05-10 PROCEDURE — 97127 HC SP THER IVNTJ W/FOCUS COG FUNCJ: CPT

## 2019-05-10 PROCEDURE — 6370000000 HC RX 637 (ALT 250 FOR IP): Performed by: INTERNAL MEDICINE

## 2019-05-10 PROCEDURE — 80048 BASIC METABOLIC PNL TOTAL CA: CPT

## 2019-05-10 PROCEDURE — 2580000003 HC RX 258: Performed by: NURSE PRACTITIONER

## 2019-05-10 PROCEDURE — 92526 ORAL FUNCTION THERAPY: CPT

## 2019-05-10 PROCEDURE — 94668 MNPJ CHEST WALL SBSQ: CPT

## 2019-05-10 PROCEDURE — 94640 AIRWAY INHALATION TREATMENT: CPT

## 2019-05-10 PROCEDURE — 6370000000 HC RX 637 (ALT 250 FOR IP): Performed by: NURSE PRACTITIONER

## 2019-05-10 PROCEDURE — 6360000002 HC RX W HCPCS: Performed by: NURSE PRACTITIONER

## 2019-05-10 PROCEDURE — 97530 THERAPEUTIC ACTIVITIES: CPT

## 2019-05-10 PROCEDURE — 36415 COLL VENOUS BLD VENIPUNCTURE: CPT

## 2019-05-10 PROCEDURE — 94760 N-INVAS EAR/PLS OXIMETRY 1: CPT

## 2019-05-10 PROCEDURE — 94669 MECHANICAL CHEST WALL OSCILL: CPT

## 2019-05-10 PROCEDURE — 97116 GAIT TRAINING THERAPY: CPT

## 2019-05-10 PROCEDURE — 97535 SELF CARE MNGMENT TRAINING: CPT

## 2019-05-10 PROCEDURE — 1200000000 HC SEMI PRIVATE

## 2019-05-10 PROCEDURE — 6370000000 HC RX 637 (ALT 250 FOR IP): Performed by: FAMILY MEDICINE

## 2019-05-10 PROCEDURE — 2700000000 HC OXYGEN THERAPY PER DAY

## 2019-05-10 RX ORDER — CETIRIZINE HYDROCHLORIDE 5 MG/1
5 TABLET ORAL NIGHTLY
Qty: 30 TABLET | Refills: 0 | Status: SHIPPED | OUTPATIENT
Start: 2019-05-10 | End: 2019-06-03

## 2019-05-10 RX ORDER — FERROUS SULFATE 325(65) MG
325 TABLET ORAL 2 TIMES DAILY WITH MEALS
Qty: 60 TABLET | Refills: 1 | Status: SHIPPED | OUTPATIENT
Start: 2019-05-10 | End: 2019-06-03

## 2019-05-10 RX ORDER — PANTOPRAZOLE SODIUM 40 MG/1
40 TABLET, DELAYED RELEASE ORAL
Qty: 30 TABLET | Refills: 0 | Status: SHIPPED | OUTPATIENT
Start: 2019-05-11 | End: 2019-06-03

## 2019-05-10 RX ORDER — POLYETHYLENE GLYCOL 3350 17 G/17G
17 POWDER, FOR SOLUTION ORAL
Qty: 527 G | Refills: 1 | Status: SHIPPED | OUTPATIENT
Start: 2019-05-12 | End: 2019-06-03

## 2019-05-10 RX ORDER — SENNA AND DOCUSATE SODIUM 50; 8.6 MG/1; MG/1
2 TABLET, FILM COATED ORAL
Qty: 60 TABLET | Refills: 1 | Status: SHIPPED | OUTPATIENT
Start: 2019-05-12

## 2019-05-10 RX ORDER — ALBUTEROL SULFATE 2.5 MG/3ML
2.5 SOLUTION RESPIRATORY (INHALATION)
Qty: 120 EACH | Refills: 3 | Status: SHIPPED | OUTPATIENT
Start: 2019-05-10 | End: 2019-06-03

## 2019-05-10 RX ORDER — GUAIFENESIN 100 MG/5ML
200 SOLUTION ORAL EVERY 6 HOURS
Qty: 1200 ML | Refills: 0 | Status: SHIPPED | OUTPATIENT
Start: 2019-05-10 | End: 2019-06-03

## 2019-05-10 RX ORDER — LACTOBACILLUS RHAMNOSUS GG 10B CELL
1 CAPSULE ORAL 2 TIMES DAILY WITH MEALS
Qty: 30 CAPSULE | Refills: 1 | Status: SHIPPED | OUTPATIENT
Start: 2019-05-10 | End: 2019-05-11 | Stop reason: HOSPADM

## 2019-05-10 RX ADMIN — FERROUS SULFATE TAB 325 MG (65 MG ELEMENTAL FE) 325 MG: 325 (65 FE) TAB at 17:19

## 2019-05-10 RX ADMIN — RIVAROXABAN 15 MG: 15 TABLET, FILM COATED ORAL at 08:53

## 2019-05-10 RX ADMIN — SODIUM CHLORIDE TAB 1 GM 1 G: 1 TAB at 08:53

## 2019-05-10 RX ADMIN — GUAIFENESIN 200 MG: 100 SOLUTION ORAL at 12:20

## 2019-05-10 RX ADMIN — Medication 15 G: at 12:20

## 2019-05-10 RX ADMIN — CETIRIZINE HYDROCHLORIDE 5 MG: 10 TABLET, FILM COATED ORAL at 22:26

## 2019-05-10 RX ADMIN — Medication 15 G: at 22:27

## 2019-05-10 RX ADMIN — ACETAMINOPHEN 650 MG: 325 TABLET, FILM COATED ORAL at 10:19

## 2019-05-10 RX ADMIN — Medication 1 CAPSULE: at 08:53

## 2019-05-10 RX ADMIN — ALBUTEROL SULFATE 2.5 MG: 2.5 SOLUTION RESPIRATORY (INHALATION) at 07:02

## 2019-05-10 RX ADMIN — ALBUTEROL SULFATE 2.5 MG: 2.5 SOLUTION RESPIRATORY (INHALATION) at 11:57

## 2019-05-10 RX ADMIN — PANTOPRAZOLE SODIUM 40 MG: 40 TABLET, DELAYED RELEASE ORAL at 06:20

## 2019-05-10 RX ADMIN — ACETAMINOPHEN 650 MG: 325 TABLET, FILM COATED ORAL at 06:20

## 2019-05-10 RX ADMIN — GUAIFENESIN 200 MG: 100 SOLUTION ORAL at 22:36

## 2019-05-10 RX ADMIN — ALBUTEROL SULFATE 2.5 MG: 2.5 SOLUTION RESPIRATORY (INHALATION) at 15:11

## 2019-05-10 RX ADMIN — Medication 10 ML: at 22:27

## 2019-05-10 RX ADMIN — LEVOTHYROXINE SODIUM 75 MCG: 75 TABLET ORAL at 06:20

## 2019-05-10 RX ADMIN — GUAIFENESIN 200 MG: 100 SOLUTION ORAL at 17:19

## 2019-05-10 RX ADMIN — FERROUS SULFATE TAB 325 MG (65 MG ELEMENTAL FE) 325 MG: 325 (65 FE) TAB at 08:54

## 2019-05-10 RX ADMIN — Medication 1 CAPSULE: at 17:19

## 2019-05-10 RX ADMIN — ALBUTEROL SULFATE 2.5 MG: 2.5 SOLUTION RESPIRATORY (INHALATION) at 19:25

## 2019-05-10 RX ADMIN — LEVOFLOXACIN 750 MG: 500 TABLET, FILM COATED ORAL at 08:53

## 2019-05-10 RX ADMIN — GUAIFENESIN 200 MG: 100 SOLUTION ORAL at 06:20

## 2019-05-10 RX ADMIN — CITALOPRAM HYDROBROMIDE 10 MG: 20 TABLET ORAL at 08:54

## 2019-05-10 RX ADMIN — Medication 10 ML: at 08:54

## 2019-05-10 ASSESSMENT — PAIN DESCRIPTION - ORIENTATION
ORIENTATION: LEFT
ORIENTATION: LEFT

## 2019-05-10 ASSESSMENT — PAIN DESCRIPTION - LOCATION
LOCATION: HIP;BACK
LOCATION: HIP
LOCATION: BACK

## 2019-05-10 ASSESSMENT — PAIN DESCRIPTION - PAIN TYPE
TYPE: CHRONIC PAIN

## 2019-05-10 ASSESSMENT — PAIN SCALES - GENERAL
PAINLEVEL_OUTOF10: 4
PAINLEVEL_OUTOF10: 0

## 2019-05-10 ASSESSMENT — PAIN SCALES - WONG BAKER
WONGBAKER_NUMERICALRESPONSE: 4
WONGBAKER_NUMERICALRESPONSE: 4

## 2019-05-10 NOTE — PROGRESS NOTES
Nephrology Note        Chief Complaint:    Chief Complaint   Patient presents with    Shortness of Breath     does not wear O2, states no HX with O2 problems, 81% on RA at SNF, is on a nonrebreather         History of Present iIlness:    Ruth Mckenna is a 80 y.o. female with h/o a . Fib, CHF, HTN was admitted with generalized weakness. She began to feel weak and dizzy and collapsed to the floor. She denies losing consciousness. She denies hitting her head. She states \" I was just extremely short of breath\". Patient arrived by EMS and it was stated she was 81% on room air at the [facility] nursing facility. Patient was placed on a nonrebreather satting 93%. Patient does not use oxygen at home. Yesterday she has very low urine output   On 3 L oxygen per NC now     Gave her lasix 40 mg iv x 1 yesterday   Sodium level low at 123   Has poor oral intake     Interval HX     Sodium level improved from 118 ---> 122 ---> 124 --.  126 ---> 129 --> 134   Denies any SOB   awake and alert   Cough       Past Medical History:   Diagnosis Date    Atrial fibrillation (HCC)     Cancer (HCC)     breast cancer     CHF (congestive heart failure) (HCC)     Hyperlipidemia     Hypertension     Seasonal allergies     Thyroid disease        Past Surgical History:   Procedure Laterality Date    EYE SURGERY      HIP SURGERY Left 7-26-14    LEFT HIP HEMIARTHROPLASTY                 HYSTERECTOMY           Current Medications:      ferrous sulfate tablet 325 mg BID WC   polyethylene glycol (GLYCOLAX) packet 17 g Q72H   sennosides-docusate sodium (SENOKOT-S) 8.6-50 MG tablet 2 tablet Q72H   guaiFENesin (ROBITUSSIN) 100 MG/5ML oral solution 200 mg Q6H   sodium chloride tablet 1 g BID WC   pantoprazole (PROTONIX) tablet 40 mg QAM AC   levofloxacin (LEVAQUIN) tablet 750 mg Every Other Day   lactobacillus (CULTURELLE) capsule 1 capsule BID WC   urea (URE-NA) packet 15 g BID   rivaroxaban (XARELTO) tablet 15 mg Daily   aspirin chewable tablet 324 mg Once   levothyroxine (SYNTHROID) tablet 75 mcg Daily   citalopram (CELEXA) tablet 10 mg Daily   sodium chloride flush 0.9 % injection 10 mL 2 times per day   sodium chloride flush 0.9 % injection 10 mL PRN   magnesium hydroxide (MILK OF MAGNESIA) 400 MG/5ML suspension 30 mL Daily PRN   ondansetron (ZOFRAN) injection 4 mg Q6H PRN   potassium chloride 10 mEq/100 mL IVPB (Peripheral Line) PRN   magnesium sulfate 1 g in dextrose 5% 100 mL IVPB PRN   acetaminophen (TYLENOL) tablet 650 mg Q4H PRN   cetirizine (ZYRTEC) tablet 5 mg Nightly   albuterol (PROVENTIL) nebulizer solution 2.5 mg Q4H WA         Physical exam:     Vitals:  BP (!) 167/69   Pulse 67   Temp 97.2 °F (36.2 °C) (Temporal)   Resp 18   Ht 5' 2\" (1.575 m)   Wt 113 lb 9.6 oz (51.5 kg)   SpO2 96%   BMI 20.78 kg/m²   Constitutional:  OAA X3 NAD  Skin: no rash, turgor wnl  Heent:  eomi, mmm  Neck: no bruits or jvd noted  Cardiovascular:  S1, S2 without m/r/g  Respiratory: Rales +  Abdomen:  +bs, soft, nt, nd  Ext: no lower extremity edema  Psychiatric: mood and affect appropriate  Musculoskeletal:  Rom, muscular strength intact    Labs:  CBC:   Recent Labs     05/08/19 0423 05/09/19  0253 05/09/19  1349   WBC 9.0  --   --    HGB 8.8* 8.3* 8.8*     --   --      BMP:    Recent Labs     05/09/19  0252 05/09/19  1349 05/10/19  0824   * 129* 134*   K 4.0 4.2 4.0   CL 91* 91* 94*   CO2 30 30 30   BUN 36* 33* 30*   CREATININE 0.6 0.7 0.6   GLUCOSE 121* 115* 98     Ca/Mg/Phos:   Recent Labs     05/08/19 0423 05/09/19  0252 05/09/19  1349 05/10/19  0824   CALCIUM 9.1   < > 8.6 8.6 8.8   MG 2.20  --   --   --   --     < > = values in this interval not displayed. Hepatic:   No results for input(s): AST, ALT, ALB, BILITOT, ALKPHOS in the last 72 hours. Troponin:   No results for input(s): TROPONINI in the last 72 hours. BNP: No results for input(s): BNP in the last 72 hours.   Lipids:   No results for input(s): CHOL, TRIG, HDL, LDLCALC, LABVLDL in the last 72 hours. ABGs: No results for input(s): PHART, PO2ART, XUN1GMM in the last 72 hours. INR: No results for input(s): INR in the last 72 hours. UA:No results for input(s): Onita Broom, GLUCOSEU, BILIRUBINUR, [place], BLOODU, PHUR, PROTEINU, UROBILINOGEN, NITRU, LEUKOCYTESUR, LABMICR, URINETYPE in the last 72 hours. Urine Microscopic: No results for input(s): LABCAST, BACTERIA, COMU, HYALCAST, WBCUA, RBCUA, EPIU in the last 72 hours. Urine Culture: No results for input(s): LABURIN in the last 72 hours. Urine Chemistry:   No results for input(s): Glenard Sober, PROTEINUR, NAUR in the last 72 hours. IMAGING:  XR CHEST PORTABLE   Final Result   Small right basilar pleural effusion. Mild left basilar atelectasis. CT Chest Pulmonary Embolism W Contrast   Final Result   No evidence of an acute pulmonary embolus. Study was mildly motion degraded. Airspace disease in the right upper lobe posterior segment consistent with   pneumonia. There is suspicion of superimposed mild interstitial edema with trace   bilateral pleural fluid and mild to moderate cardiomegaly. Subacute displaced overlapping fracture of the mid body of the sternum. Clinical correlation recommended. CT HEAD WO CONTRAST   Final Result   1. No acute intracranial abnormality. 2. Age-appropriate atrophy with chronic small vessel ischemic white matter   disease. 3. Paranasal sinus disease, as detailed above. XR CHEST STANDARD (2 VW)   Final Result   No acute cardiopulmonary process. COPD. I/O last 3 completed shifts: In: 360 [P.O.:360]  Out: 785 [Drains:785]          Assessment/Plan :      1. Hyponatremia. Improving   Sodium 134  2/2 Low solute intake     discontinue the sodium chloride tablets   On urea pack 15 gram po bid  X 7 days     2. Urine output good now   Lasix as needed  ECHO results reviewed     3. Anemia.  Has iron deficiency anemia. Got  iv venofer     4. Acid- base disorder. Improving       D/w Dr. Sierra Pete .  Stable from nephrology standpoint                 Thank you for allowing us to participate in care of Didi Morales         Electronically signed by: Conchetta Libman, MD, 5/10/2019, 9:44 AM      [facility] [address]  Office : [phone]  Fax :[phone]

## 2019-05-10 NOTE — PROGRESS NOTES
Assessment and med pass complete. Takes meds crushed in applesauce, drinks nectar thick liquids. Complaints of left hip pain, medicated with tylenol. Repositioned with pillow support. Got kleenex per request. Denies other needs, call light in reach, bed alarm and camera engaged.

## 2019-05-10 NOTE — PROGRESS NOTES
05/10/19 1925   Cough/Sputum   Sputum How Obtained Cough on request   Cough Congested;Non-productive   Sputum Amount None   vpep performed

## 2019-05-10 NOTE — PROGRESS NOTES
Speech Language Pathology  Dysphagia Treatment Note    Name:  Herber Muniz  :   1925  Medical Diagnosis:  CHF (congestive heart failure), NYHA class I, acute on chronic, combined ([address] [I50.43]  CHF (congestive heart failure), NYHA class I, acute on chronic, combined ([address] [I50.43]  Treatment Diagnosis: Oropharyngeal Dysphagia  Pain level:  Pt did not report pain    Current Diet Level:   1)Dysphagia I (puree) diet with NECTAR thick liquids  2)Discontinue thin water between meals due to noted symptoms of penetration/aspiration with thins  3)Meds crushed in puree as able  Tolerance of Current Diet Level: Per chart review and nursing report, no documented difficulties with current diet level noted. Assessment of Texture Tolerance:  -Impressions: Pt was positioned upright in recliner chair, asleep on 2L of O2 via nasal cannula upon arrival. Lunch tray of puree and nectar thick liquids were set up for pt with minimal amount consumed. Pt easily stimulated via verbal cues. Pt willingly accepted trials of thin liquids, nectar thick liquids, and soft solids. Thin liquids revealed suspected premature loss of bolus to pharynx with delayed swallow initiation and delayed cough. Nectar thick liquids tolerated with improved bolus control and swallow initiation with no overt clinical s/s of aspiration/penetration. Pt required intermittent second dry swallow with both thin and nectar thick liquids. Soft solid peaches tolerated with slightly decreased A-P propulsion and eventual oral clearance. No overt clinical s/s of aspiration/penetration assessed with soft solids. Recommend upgrade to Dysphagia II (mechanically altered) texture diet this date. If difficulty assessed with soft solids, recommend downgrade back to puree textures.     Diet and Treatment Recommendations:  1) Recommend upgrade to Dysphagia II (mechanically altered) diet with NECTAR thick liquids  2) Discontinue thin water between meals due to noted symptoms of penetration/aspiration with thins  3) Meds crushed in puree as able    Dysphagia Goals:  1.) Pt will tolerate recommended diet without s/s of aspiration (ongoing 5/10/2019)   2.) Pt will tolerate MBS to r/o aspiration and determine appropriate diet/liquid level. (ongoing 5/10/2019)   3.) Pt will tolerate diet advance to least restricted diet, as clinically indicated, with no signs of aspiration  (ongoing 5/10/2019)     Cognitive Treatment/Goals:  Pt alert and oriented to self and place only. Pt disoriented to month, year, date, and day. Pt unable to recall daily events this date. Cognitive Goals:  1)Pt will improve orientation to x4, for improved awareness of surroundings and reduced confusion (Ongoing 5/10/19)  -Oriented to person and type of place only   -Unable to recall temporal concepts    2)Pt will improve short term recall of daily events and newly learned information via graded tasks, to 80%, for improved safety with dysphagia recommendations. (Ongoing 5/10/19)  -Unable to recall diet modifications or rationale for modifications.  -Limited carryover of compensatory strategies again was noted. Plan:  Continued daily Dysphagia treatment with goals per plan of care. Patient/Family Education:Education given to the Pt and nurse, who verbalized understanding    Discharge Recommendations:  Pt will benefit from continued skilled Speech Therapy for Dysphagia services, prior to returning home. Timed Code Treatment: 10 min    Total Treatment Time: 24 min    Bg CHAVARRIA CCC-SLP S.PRomero [id number]  Speech-Language Pathologist

## 2019-05-10 NOTE — PROGRESS NOTES
Nutrition Assessment    Type and Reason for Visit: Initial(LOS assessment )    Nutrition Recommendations:   1. Diet with appropriate consistency per SLP  2. Trial magic cup BID  3. Encourage PO intake  4. Document all PO intake in flow sheets     Nutrition Assessment: Pt soundly sleeping upon RD visit; information obtained from EMR. Pt appears to be nutritionally compromised as evidenced by variable but overall inadequate PO intake x 7 days during admission. Pt consuming less than 50% of most meals on cardiac/ dysphagia I pureed diet with nectar thick liquids and 1800 mL fluid restriction. Will trial magic cup BID. Pt will remain at risk for further nutrition compromise until PO intake is consistently at least 50% of meals and supplements. Malnutrition Assessment:  · Malnutrition Status: No malnutrition    Nutrition Risk Level: Moderate    Nutrient Needs:  · Estimated Daily Total Kcal: 8423-4099   · Estimated Daily Protein (g): 62-77 grams   · Estimated Daily Total Fluid (ml/day): 1 mL per kcal     Nutrition Diagnosis:   · Problem: Inadequate oral intake  · Etiology: related to Insufficient energy/nutrient consumption     Signs and symptoms:  as evidenced by Intake 0-25%, Intake 25-50%    Objective Information:  · Nutrition-Focused Physical Findings: No edema noted. -3.6 liters.    · Wound Type: None  · Current Nutrition Therapies:  · Oral Diet Orders: Dysphagia 1 (Pureed), No Straws, Nectar Thick, Fluid Restriction, Cardiac   · Oral Diet intake: 1-25%, 51-75%(Variable )  · Oral Nutrition Supplement (ONS) Orders: None  · Anthropometric Measures:  · Ht: 5' 2\" (157.5 cm)   · Current Body Wt: 113 lb (51.3 kg)  · Ideal Body Wt: 110 lb (49.9 kg)   · BMI Classification: BMI 18.5 - 24.9 Normal Weight    Nutrition Interventions:   Continue current diet, Start ONS  Continued Inpatient Monitoring, Education Initiated    Nutrition Evaluation:   · Evaluation: Goals set   · Goals: Pt will consume at least 50% of meals and supplements     · Monitoring: Meal Intake, Supplement Intake, Diet Tolerance, Chewing/Swallowing, Pertinent Labs, Weight, Patient/Family Education      Electronically signed by Donny Rojas RD, LD on 5/10/19 at 2:19 PM    Contact Number: [phone]

## 2019-05-10 NOTE — PLAN OF CARE
Nutrition Problem: Inadequate oral intake  Intervention: Food and/or Nutrient Delivery: Continue current diet, Start ONS  Nutritional Goals: Pt will consume at least 50% of meals and supplements

## 2019-05-10 NOTE — PROGRESS NOTES
Occupational Therapy  Facility/Department: [address] NURSING  Daily Treatment Note  NAME: Priscilla Stovall  : 1925  MRN: [id number]    Date of Service: 5/10/2019    Discharge Recommendations: Priscilla Stovall scored a 10/24 on the AM-PAC ADL Inpatient form. Current research shows that an AM-PAC score of 17 or less is typically not associated with a discharge to the patient's home setting. Based on the patients AM-PAC score and their current ADL deficits, it is recommended that the patient have 3-5 sessions per week of Occupational Therapy at d/c to increase the patients independence. OT Equipment Recommendations  Equipment Needed: No    Assessment   Performance deficits / Impairments: Decreased functional mobility ; Decreased cognition;Decreased high-level IADLs;Decreased ADL status; Decreased endurance;Decreased coordination;Decreased balance;Decreased safe awareness  Treatment Diagnosis: decreasaed independence with ADL related to late affects of CHF  Prognosis: Good  Exam: ADL, mobility, transfers  Assistance / Modification: physical assistance  Patient Education: OT eval, transfers, orientation  REQUIRES OT FOLLOW UP: Yes  Activity Tolerance  Activity Tolerance: Treatment limited secondary to decreased cognition  Safety Devices  Safety Devices in place: Yes  Type of devices: All fall risk precautions in place; Chair alarm in place;Call light within reach; Left in chair;Nurse notified;Gait belt         Patient Diagnosis(es): The primary encounter diagnosis was Congestive heart failure, unspecified HF chronicity, unspecified heart failure type ([address] Diagnoses of Pneumonia due to organism, Hypoxia, Fall, initial encounter, and Elevated troponin were also pertinent to this visit. has a past medical history of Atrial fibrillation ([address] Cancer ([address] CHF (congestive heart failure) ([address] Hyperlipidemia, Hypertension, Seasonal allergies, and Thyroid disease.    has a past surgical history that includes eye surgery; Hysterectomy; and hip surgery (Left, 7-26-14). Restrictions  Restrictions/Precautions  Restrictions/Precautions: Fall Risk, Modified Diet, Swallowing - Thickened Liquids(HIGH FALL RISK; dysphagia II mechanically altered, nectar thick liquids, 1800 mL restriction)  Position Activity Restriction  Other position/activity restrictions: Josep Joe is a 80 y.o. female who presents to the ED for shortness of breath. Patient reports she was at home and was walking to the bathroom and became weak and dizzy and decided to return but became too weak and collapsed to the ground. She denies head injury or loss of consciousness. She feels very short of breath. She denies any chest pain or fevers. Subjective   General  Chart Reviewed: Yes  Family / Caregiver Present: No  Diagnosis: CHF  Subjective  Subjective: Pt supine in bed, [place], agreeable to cotx with encouragement  General Comment  Comments: 80 y.o. female with PMHx of Atrial fibrillation, CHF, hyperlipidemia, hypertension and thyroid disease  presented to [facility] emergency room in which the patient states that today she was on her way to the bathroom. She began to feel weak and dizzy and collapsed to the floor. She denies losing consciousness. She denies hitting her head. She states \" I was just extremely short of breath\". Pain Assessment  Pain Assessment: Faces  Dhaliwal-Baker Pain Rating: Hurts little more  Vital Signs  Patient Currently in Pain: Yes(Nursing just gave pt tylenol for back. )     Objective    ADL  Grooming: Stand by assistance;Setup(washing face)  UE Bathing: Minimal assistance(A for balance to bathe UB using wipe)  UE Dressing:  Moderate assistance(gown)  Toileting: Dependent/Total(nassar)  Additional Comments: seated EOB for all ADL        Balance  Sitting Balance: Minimal assistance(between min and occasional mod A with cuing to correct back to min)  Standing Balance: Maximum assistance(with RW)  Standing Balance  Time: less than one minute  Activity: stand step t/f  EOB > recliner  Functional Mobility  Functional - Mobility Device: Rolling Walker  Activity: Other  Assist Level: Maximum assistance  Functional Mobility Comments: side stepping from EOB > recliner, max cuing/encouragement  Bed mobility  Supine to Sit: Maximum assistance  Scooting: Maximal assistance  Comment: Pt remained in chair at end of tx. A of 2 for safety. Pt attempting to return to supine, lunging to side during sitting. Min A for balance with mod A when pt lunges and cuing to return to min A balance. Reaching for anything possible d/t fear seated EOB  Transfers  Stand Pivot Transfers: Maximum assistance(eob > recliner)  Sit to stand: Maximum assistance(x1, from EOB)  Stand to sit: Maximum assistance(x1, to recliner)         Cognition  Overall Cognitive Status: Exceptions  Arousal/Alertness: Inconsistent responses to stimuli  Following Commands: Follows one step commands with increased time; Follows one step commands with repetition  Attention Span: Attends with cues to redirect  Memory: Decreased recall of biographical Information;Decreased recall of recent events;Decreased short term memory;Decreased long term memory  Safety Judgement: Decreased awareness of need for safety  Problem Solving: Assistance required to generate solutions;Assistance required to implement solutions;Assistance required to correct errors made;Decreased awareness of errors  Insights: Decreased awareness of deficits  Initiation: Requires cues for all  Sequencing: Requires cues for all      Type of ROM/Therapeutic Exercise  Comment: x5 core pushes against therapist to facilitate activation of sit>stand pre-extension phase      Plan   Plan  Times per week: 3-5  Times per day: Daily  Current Treatment Recommendations: Safety Education & Training, Balance Training, Patient/Caregiver Education & Training, Self-Care / ADL, Functional Mobility Training, Neuromuscular Re-education, Endurance Training  G-Code     OutComes Score                                                  AM-PAC Score        AM-PAC Inpatient Daily Activity Raw Score: 10  AM-PAC Inpatient ADL T-Scale Score : 27.31  ADL Inpatient CMS 0-100% Score: 74.7  ADL Inpatient CMS G-Code Modifier : CL    Goals  Short term goals  Short term goal 1: min assist bathing from chair - min A UB bathing EOB 5/10  Short term goal 2: min assist functional transfers - max 5/10  Short term goal 3: min assist toileting - not addressed 5/10; nassar cath  Short term goal 4: increase am pac score from 10 to 14 - 10, 5/10  Patient Goals   Patient goals : patient's unable to state son's goal is for her to go back to assisted living       Therapy Time   Individual Concurrent Group Co-treatment   Time In       1026   Time Out       1049   Minutes       23      Timed Code Treatment Minutes:  23 Minutes    Total Treatment Minutes:  23 minutes    MARCO Ybarra OTR/L [id number]    Aston Hicks OT

## 2019-05-10 NOTE — PROGRESS NOTES
mL Intravenous 2 times per day    cetirizine  5 mg Oral Nightly    albuterol  2.5 mg Nebulization Q4H WA       Infusions:       PRN Meds: sodium chloride flush, magnesium hydroxide, ondansetron, potassium chloride, magnesium sulfate, acetaminophen    Labs/imaging:  CBC:   Recent Labs     05/08/19  0423 05/09/19  0253 05/09/19  1349   WBC 9.0  --   --    HGB 8.8* 8.3* 8.8*     --   --          BMP:    Recent Labs     05/09/19  0252 05/09/19  1349 05/10/19  0824   * 129* 134*   K 4.0 4.2 4.0   CL 91* 91* 94*   CO2 30 30 30   BUN 36* 33* 30*   CREATININE 0.6 0.7 0.6   GLUCOSE 121* 115* 98         Hepatic: No results for input(s): AST, ALT, ALB, BILITOT, ALKPHOS in the last 72 hours. Troponin:   No results for input(s): TROPONINI in the last 72 hours. BNP: No results for input(s): BNP in the last 72 hours. INR: No results for input(s): INR in the last 72 hours. Reviewed imaging and reports noted      Assessment:  Active Problems:    A-fib (HCC)    HTN (hypertension)    CHF (congestive heart failure), NYHA class I, acute on chronic, combined (HCC)    Hyponatremia    Elevated troponin    Community acquired bacterial pneumonia    Hypoxemia    Congestive heart failure (HCC)    Hypoxia    Pneumonia due to organism    Acute respiratory failure with hypoxia ([facility]    Fall    TRENT (acute kidney injury) ([facility]    Metabolic alkalosis  Resolved Problems:    * No resolved hospital problems.  *        Plan:  Hyponatremia  - Nephro following  - cont urea and sodium chloride tabs, improving  - Goal is to increase 10 - 12 mEq/24hrs      CAP - RUL  - Pulm evaluated and s/o and ID following, abx per ID  - mucinex, cont IS      Acute Resp Failure w/ Hypoxia  - 2/2 above, CXR pending  - Pulm evaluated and s/o, maintain O2 sats > 88%      Acute Metabolic Encephalopathy  - 2/2 above, improving, cont to treat above      Acute dCHF   - improved, ECHO revealed EF 55%, indeterminate diastolic function, LVH, mod tricuspid regurg  - cardio evaluated and s/o  - has received IV lasix       PAF  - on xarelto for AC      KEEGAN  - got venofer, restart ferrous sulfate      Hypothyroidism  - cont synthroid    Dispo: Awaiting precert for [facility], DC & CHRISTINA completed. Diet: DIET DYSPHAGIA I PUREED;  Nectar Thick; Daily Fluid Restriction: 1800 ml; Cardiac, No Drinking Straw    Activity: up with assist  Prophylaxis: xarelto    Code status: DNR-CC     ----------        Claude Lawrence MD  -------------------------------  Rounding hospitalist

## 2019-05-10 NOTE — PROGRESS NOTES
Physical Therapy    Facility/Department: [facility] 3A NURSING  Initial Assessment    NAME: Charu Foy  : 1925  MRN: [id number]    Date of Service: 5/10/2019    Discharge Recommendations:Eva Chavez scored a  on the AM-PAC short mobility form. Current research shows that an AM-PAC score of 17 or less is typically not associated with a discharge to the patient's home setting. Based on the patients AM-PAC score and their current functional mobility deficits, it is recommended that the patient have 3-5 sessions per week of Physical Therapy at d/c to increase the patients independence. PT Equipment Recommendations  Equipment Needed: No    Assessment   Body structures, Functions, Activity limitations: Decreased functional mobility ; Decreased safe awareness;Decreased balance;Decreased ADL status; Decreased cognition;Decreased endurance;Decreased strength  Assessment: Pt needing max A for mobility x 1 but assist of 2 needed for safety. Pt needing skilled PT services to address these issues. Treatment Diagnosis: impaired functional mobility  Prognosis: Good  Patient Education: POC, hand placement for transfers, constant reorientation to task and reassured during treatment. REQUIRES PT FOLLOW UP: Yes  Activity Tolerance  Activity Tolerance: Patient limited by cognitive status; Patient limited by endurance       Patient Diagnosis(es): The primary encounter diagnosis was Congestive heart failure, unspecified HF chronicity, unspecified heart failure type ([address] Diagnoses of Pneumonia due to organism, Hypoxia, Fall, initial encounter, and Elevated troponin were also pertinent to this visit. has a past medical history of Atrial fibrillation ([address] Cancer ([address] CHF (congestive heart failure) ([address] Hyperlipidemia, Hypertension, Seasonal allergies, and Thyroid disease. has a past surgical history that includes eye surgery;  Hysterectomy; and hip surgery (Left, 7-26-14). Restrictions  Restrictions/Precautions  Restrictions/Precautions: Fall Risk, Modified Diet, Swallowing - Thickened Liquids(HIGH FALL RISK; dysphagia II mechanically altered, nectar thick liquids, 1800 mL restriction)  Position Activity Restriction  Other position/activity restrictions: Scotty Dexter is a 80 y.o. female who presents to the ED for shortness of breath. Patient reports she was at home and was walking to the bathroom and became weak and dizzy and decided to return but became too weak and collapsed to the ground. She denies head injury or loss of consciousness. She feels very short of breath. She denies any chest pain or fevers. Vision/Hearing        Subjective  General  Chart Reviewed: Yes  Response To Previous Treatment: Patient with no complaints from previous session. Family / Caregiver Present: Yes(OT for cotx)  General Comment  Comments: supine in bed upon arrival, confused. Subjective  Subjective: Pt reports being too tired to get out of bed. Nursing reports pt has refused out of bed for 2 days. Pain Screening  Patient Currently in Pain: Yes(Nursing just gave pt tylenol for back. )  Pain Assessment  Pain Assessment: Faces  Dhaliwal-Baker Pain Rating: Hurts little more  Pain Type: Chronic pain  Pain Location: Back  Vital Signs  Patient Currently in Pain: Yes(Nursing just gave pt tylenol for back. )       Orientation  Orientation  Orientation Level: Oriented to person;Disoriented to time;Disoriented to place; Disoriented to situation  Social/Functional History  Social/Functional History  Lives With: Alone  Type of Home: Assisted living  Home Access: Level entry  Home Equipment: Rolling walker, Cane  ADL Assistance: Needs assistance(assistance with bathing, independent with dressing)  Homemaking Responsibilities: No  Ambulation Assistance: Independent(uses RW for ambulation)  Transfer Assistance: Independent  Additional Comments: Pt difficult to understand when answering questions. in place: No    G-Code       OutComes Score                                                  AM-PAC Score  AM-PAC Inpatient Mobility Raw Score : 9  AM-PAC Inpatient T-Scale Score : 30.55  Mobility Inpatient CMS 0-100% Score: 81.38  Mobility Inpatient CMS G-Code Modifier : CM          Goals  Short term goals  Time Frame for Short term goals: to be met by discharge (no goals met in full this date)  Short term goal 1: Pt to perform bed mobility with min A  Short term goal 2: Pt to perform STS transfers with CGA  Short term goal 3: Pt to ambulate with RW 25 ft with min A   Patient Goals   Patient goals : Did not state       Therapy Time   Individual Concurrent Group Co-treatment   Time In       1026   Time Out       1049   Minutes       23   Timed Code Treatment Minutes: [address] [facility], [id number]

## 2019-05-10 NOTE — PLAN OF CARE
Problem: Falls - Risk of:  Goal: Will remain free from falls  Description  Will remain free from falls  Outcome: Ongoing  Note:   Fall precautions in place, bed alarm on, nonskid foot wear applied, bed in lowest position, and call light within reach. Will continue to monitor. Problem: Risk for Impaired Skin Integrity  Goal: Tissue integrity - skin and mucous membranes  Description  Structural intactness and normal physiological function of skin and  mucous membranes. Outcome: Ongoing  Note:   Patient assisted with turning at least every 2 hours, or found to be in a different position. Skin assessed for breakdown.

## 2019-05-11 VITALS
BODY MASS INDEX: 20.67 KG/M2 | TEMPERATURE: 97.5 F | SYSTOLIC BLOOD PRESSURE: 130 MMHG | HEART RATE: 70 BPM | RESPIRATION RATE: 16 BRPM | HEIGHT: 62 IN | OXYGEN SATURATION: 96 % | DIASTOLIC BLOOD PRESSURE: 64 MMHG | WEIGHT: 112.3 LBS

## 2019-05-11 LAB
ANION GAP SERPL CALCULATED.3IONS-SCNC: 7 MMOL/L (ref 3–16)
BUN BLDV-MCNC: 35 MG/DL (ref 7–20)
CALCIUM SERPL-MCNC: 8.8 MG/DL (ref 8.3–10.6)
CHLORIDE BLD-SCNC: 95 MMOL/L (ref 99–110)
CO2: 31 MMOL/L (ref 21–32)
CREAT SERPL-MCNC: 0.5 MG/DL (ref 0.6–1.2)
GFR AFRICAN AMERICAN: >60
GFR NON-AFRICAN AMERICAN: >60
GLUCOSE BLD-MCNC: 102 MG/DL (ref 70–99)
POTASSIUM SERPL-SCNC: 4.5 MMOL/L (ref 3.5–5.1)
SODIUM BLD-SCNC: 133 MMOL/L (ref 136–145)

## 2019-05-11 PROCEDURE — 36415 COLL VENOUS BLD VENIPUNCTURE: CPT

## 2019-05-11 PROCEDURE — 6370000000 HC RX 637 (ALT 250 FOR IP): Performed by: NURSE PRACTITIONER

## 2019-05-11 PROCEDURE — 94640 AIRWAY INHALATION TREATMENT: CPT

## 2019-05-11 PROCEDURE — 6360000002 HC RX W HCPCS: Performed by: NURSE PRACTITIONER

## 2019-05-11 PROCEDURE — 2580000003 HC RX 258: Performed by: NURSE PRACTITIONER

## 2019-05-11 PROCEDURE — 6370000000 HC RX 637 (ALT 250 FOR IP): Performed by: INTERNAL MEDICINE

## 2019-05-11 PROCEDURE — 80048 BASIC METABOLIC PNL TOTAL CA: CPT

## 2019-05-11 PROCEDURE — 94760 N-INVAS EAR/PLS OXIMETRY 1: CPT

## 2019-05-11 PROCEDURE — 94668 MNPJ CHEST WALL SBSQ: CPT

## 2019-05-11 PROCEDURE — 6370000000 HC RX 637 (ALT 250 FOR IP): Performed by: FAMILY MEDICINE

## 2019-05-11 PROCEDURE — 94669 MECHANICAL CHEST WALL OSCILL: CPT

## 2019-05-11 PROCEDURE — 2700000000 HC OXYGEN THERAPY PER DAY

## 2019-05-11 RX ADMIN — ALBUTEROL SULFATE 2.5 MG: 2.5 SOLUTION RESPIRATORY (INHALATION) at 05:58

## 2019-05-11 RX ADMIN — Medication 10 ML: at 09:08

## 2019-05-11 RX ADMIN — RIVAROXABAN 15 MG: 15 TABLET, FILM COATED ORAL at 09:08

## 2019-05-11 RX ADMIN — Medication 15 G: at 09:08

## 2019-05-11 RX ADMIN — LEVOTHYROXINE SODIUM 75 MCG: 75 TABLET ORAL at 06:31

## 2019-05-11 RX ADMIN — Medication 1 CAPSULE: at 09:08

## 2019-05-11 RX ADMIN — ACETAMINOPHEN 650 MG: 325 TABLET, FILM COATED ORAL at 07:38

## 2019-05-11 RX ADMIN — GUAIFENESIN 200 MG: 100 SOLUTION ORAL at 06:31

## 2019-05-11 RX ADMIN — CITALOPRAM HYDROBROMIDE 10 MG: 20 TABLET ORAL at 09:08

## 2019-05-11 RX ADMIN — PANTOPRAZOLE SODIUM 40 MG: 40 TABLET, DELAYED RELEASE ORAL at 06:31

## 2019-05-11 RX ADMIN — FERROUS SULFATE TAB 325 MG (65 MG ELEMENTAL FE) 325 MG: 325 (65 FE) TAB at 09:08

## 2019-05-11 RX ADMIN — ACETAMINOPHEN 650 MG: 325 TABLET, FILM COATED ORAL at 00:56

## 2019-05-11 RX ADMIN — ALBUTEROL SULFATE 2.5 MG: 2.5 SOLUTION RESPIRATORY (INHALATION) at 11:57

## 2019-05-11 ASSESSMENT — PAIN DESCRIPTION - ORIENTATION
ORIENTATION: LEFT

## 2019-05-11 ASSESSMENT — PAIN DESCRIPTION - LOCATION
LOCATION: HIP

## 2019-05-11 ASSESSMENT — PAIN SCALES - GENERAL
PAINLEVEL_OUTOF10: 7
PAINLEVEL_OUTOF10: 0
PAINLEVEL_OUTOF10: 0
PAINLEVEL_OUTOF10: 5

## 2019-05-11 ASSESSMENT — PAIN DESCRIPTION - PAIN TYPE
TYPE: CHRONIC PAIN

## 2019-05-11 NOTE — PROGRESS NOTES
Assessment and med pass complete. Meds taken crushed in applesauce, water nectar thick. Repositioned with pillow support. Denies needs, call light in reach, bed alarm and camera engaged.

## 2019-05-11 NOTE — DISCHARGE SUMMARY
tongue normal; teeth and gums normal   Neck:   Supple, symmetrical, trachea midline, no adenopathy;     thyroid:  no enlargement/tenderness/nodules; no carotid    bruit or JVD   Back:     Symmetric, no curvature, ROM normal, no CVA tenderness   Lungs:     Clear to auscultation bilaterally, respirations unlabored   Chest Wall:    No tenderness or deformity    Heart:    Regular rate and rhythm, S1 and S2 normal, no murmur, rub   or gallop   Breast Exam:    No tenderness, masses, or nipple abnormality   Abdomen:     Soft, non-tender, bowel sounds active all four quadrants,     no masses, no organomegaly   Genitalia:    Normal female without lesion, discharge or tenderness   Rectal:    Normal tone ;guaiac negative stool   Extremities:   Extremities normal, atraumatic, no cyanosis or edema   Pulses:   2+ and symmetric all extremities   Skin:   Skin color, texture, turgor normal, no rashes or lesions   Lymph nodes:   Cervical, supraclavicular, and axillary nodes normal   Neurologic:   CNII-XII intact, normal strength, sensation and reflexes     throughout       Disposition: SNF    In process/preliminary results:  Outstanding Order Results     No orders found from 4/4/2019 to 5/4/2019.           Patient Instructions:   Discharge Medication List as of 5/11/2019 11:14 AM      START taking these medications    Details   albuterol (PROVENTIL) (2.5 MG/3ML) 0.083% nebulizer solution Take 3 mLs by nebulization every 4 hours (while awake), Disp-120 each, R-3Normal      lactobacillus (CULTURELLE) capsule Take 1 capsule by mouth 2 times daily (with meals), Disp-30 capsule, R-1Normal      cetirizine (ZYRTEC) 5 MG tablet Take 1 tablet by mouth nightly, Disp-30 tablet, R-0Normal      guaiFENesin (ROBITUSSIN) 100 MG/5ML SOLN oral solution Take 10 mLs by mouth every 6 hours, Disp-1200 mL, R-0Normal      urea (URE-NA) 15 g PACK packet Take 15 g by mouth 2 times daily for 7 days, Disp-14 Package, R-0Print      ferrous sulfate 325 (65 Fe) MG tablet Take 1 tablet by mouth 2 times daily (with meals), Disp-60 tablet, R-1Normal      polyethylene glycol (GLYCOLAX) packet Take 17 g by mouth every 72 hours, Disp-527 g, R-1Normal      pantoprazole (PROTONIX) 40 MG tablet Take 1 tablet by mouth every morning (before breakfast), Disp-30 tablet, R-0Normal         CONTINUE these medications which have CHANGED    Details   rivaroxaban (XARELTO) 15 MG TABS tablet Take 1 tablet by mouth daily, Disp-42 tablet, R-1Normal      sennosides-docusate sodium (SENOKOT-S) 8.6-50 MG tablet Take 2 tablets by mouth every 72 hours, Disp-60 tablet, R-1Normal         CONTINUE these medications which have NOT CHANGED    Details   Cholecalciferol (VITAMIN D3) 2000 units CAPS Take 2,000 capsules by mouth daily Takes 2 cap to equal 4,000Historical Med      citalopram (CELEXA) 10 MG tablet TAKE ONE TABLET BY MOUTH DAILY, Disp-30 tablet, R-9Normal      levothyroxine (SYNTHROID) 75 MCG tablet TAKE ONE TABLET BY MOUTH DAILY, Disp-90 tablet, R-2Normal         STOP taking these medications       Loratadine (CLARITIN PO) Comments:   Reason for Stopping:             Activity: activity as tolerated  Diet: regular diet  Wound Care: none needed    Follow-up with pcp in 1 week.     Signed:  Royce Poole MD  Time spent: 36 mins   5/11/2019  1:48 PM

## 2019-05-11 NOTE — PROGRESS NOTES
Report called to [facility] and this RN spoke with pt. Nurse Nam. All questions answered. Son at bedside and updated on POC and in agreement with discharge to [facility]. Transport set for noon. Discharge paperwork faxed and copy will be sent with patient. IV out and tele removed. Pt. D/C'd in stable condition.    Yue Joseph 11:28 AM

## 2019-05-11 NOTE — FLOWSHEET NOTE
Shift assessment complete. VSS. Pt. Is alert and resting comfortably in bed. Pt. Alert to self and situation at this time. No complaints at this time. POC discussed with family and family aware of discharge today at noon. Camera in use. Bed alarm engaged. Call light and bedside table within reach. 05/11/19 0830   Vital Signs   Temp 97 °F (36.1 °C)   Temp Source Temporal   Pulse 73   Heart Rate Source Brachial   Resp 16   BP (!) 152/67   BP Location Left upper arm   Patient Position Semi fowlers   Level of Consciousness 0   MEWS Score 1   Patient Currently in Pain No   Pain Assessment   Pain Assessment Faces   Patient's Stated Pain Goal 2   Pain Type Chronic pain   Pain Location Hip   Pain Orientation Left   Pain Assessment/FLACC   Pain Rating: FLACC (rest) - Face 0   Pain Rating: FLACC (rest) - Legs 0   Pain Rating: FLACC (rest) - Activity 0   Pain Rating: FLACC (rest) - Cry 0   Pain Rating: FLACC (rest) - Consolability 0   Score: FLACC (rest) 0   Oxygen Therapy   SpO2 97 %   Pulse Oximeter Device Mode Intermittent   Pulse Oximeter Device Location Finger   O2 Device Nasal cannula   O2 Flow Rate (L/min) 2 L/min   Will continue to monitor.  Yue Joseph

## 2019-05-11 NOTE — PROGRESS NOTES
Nephrology Note        Chief Complaint:    Chief Complaint   Patient presents with    Shortness of Breath     does not wear O2, states no HX with O2 problems, 81% on RA at SNF, is on a nonrebreather         History of Present iIlness:    Grant Morelos is a 80 y.o. female with h/o a . Fib, CHF, HTN was admitted with generalized weakness. She began to feel weak and dizzy and collapsed to the floor. She denies losing consciousness. She denies hitting her head. She states \" I was just extremely short of breath\". Patient arrived by EMS and it was stated she was 81% on room air at the [facility] nursing facility. Patient was placed on a nonrebreather satting 93%. Patient does not use oxygen at home. Yesterday she has very low urine output   On 3 L oxygen per NC now     Gave her lasix 40 mg iv x 1 yesterday   Sodium level low at 123   Has poor oral intake     Interval HX     Sodium level improved from 118 ---> 122 ---> 124 --.  126 ---> 129 --> 134   Denies any SOB   awake and alert   Cough       Past Medical History:   Diagnosis Date    Atrial fibrillation (HCC)     Cancer (HCC)     breast cancer     CHF (congestive heart failure) (HCC)     Hyperlipidemia     Hypertension     Seasonal allergies     Thyroid disease        Past Surgical History:   Procedure Laterality Date    EYE SURGERY      HIP SURGERY Left 7-26-14    LEFT HIP HEMIARTHROPLASTY                 HYSTERECTOMY           Current Medications:      ferrous sulfate tablet 325 mg BID WC   polyethylene glycol (GLYCOLAX) packet 17 g Q72H   sennosides-docusate sodium (SENOKOT-S) 8.6-50 MG tablet 2 tablet Q72H   guaiFENesin (ROBITUSSIN) 100 MG/5ML oral solution 200 mg Q6H   pantoprazole (PROTONIX) tablet 40 mg QAM AC   levofloxacin (LEVAQUIN) tablet 750 mg Every Other Day   lactobacillus (CULTURELLE) capsule 1 capsule BID WC   urea (URE-NA) packet 15 g BID   rivaroxaban (XARELTO) tablet 15 mg Daily   levothyroxine (SYNTHROID) tablet 75 mcg Daily citalopram (CELEXA) tablet 10 mg Daily   sodium chloride flush 0.9 % injection 10 mL 2 times per day   sodium chloride flush 0.9 % injection 10 mL PRN   magnesium hydroxide (MILK OF MAGNESIA) 400 MG/5ML suspension 30 mL Daily PRN   ondansetron (ZOFRAN) injection 4 mg Q6H PRN   potassium chloride 10 mEq/100 mL IVPB (Peripheral Line) PRN   magnesium sulfate 1 g in dextrose 5% 100 mL IVPB PRN   acetaminophen (TYLENOL) tablet 650 mg Q4H PRN   cetirizine (ZYRTEC) tablet 5 mg Nightly   albuterol (PROVENTIL) nebulizer solution 2.5 mg Q4H WA         Physical exam:     Vitals:  BP (!) 152/67   Pulse 70   Temp 97 °F (36.1 °C) (Temporal)   Resp 16   Ht 5' 2\" (1.575 m)   Wt 112 lb 4.8 oz (50.9 kg)   SpO2 97%   BMI 20.54 kg/m²   Constitutional:  OAA X3 NAD  Skin: no rash, turgor wnl  Heent:  eomi, mmm  Neck: no bruits or jvd noted  Cardiovascular:  S1, S2 without m/r/g  Respiratory: Rales +  Abdomen:  +bs, soft, nt, nd  Ext: no lower extremity edema  Psychiatric: mood and affect appropriate  Musculoskeletal:  Rom, muscular strength intact    Labs:  CBC:   Recent Labs     05/09/19  0253 05/09/19  1349   HGB 8.3* 8.8*     BMP:    Recent Labs     05/09/19  1349 05/10/19  0824 05/11/19  0544   * 134* 133*   K 4.2 4.0 4.5   CL 91* 94* 95*   CO2 30 30 31   BUN 33* 30* 35*   CREATININE 0.7 0.6 0.5*   GLUCOSE 115* 98 102*     Ca/Mg/Phos:   Recent Labs     05/09/19  1349 05/10/19  0824 05/11/19  0544   CALCIUM 8.6 8.8 8.8     Hepatic:   No results for input(s): AST, ALT, ALB, BILITOT, ALKPHOS in the last 72 hours. Troponin:   No results for input(s): TROPONINI in the last 72 hours. BNP: No results for input(s): BNP in the last 72 hours. Lipids:   No results for input(s): CHOL, TRIG, HDL, LDLCALC, LABVLDL in the last 72 hours. ABGs: No results for input(s): PHART, PO2ART, FZY1DGO in the last 72 hours. INR: No results for input(s): INR in the last 72 hours.   UA:No results for input(s): Angelo Perks, GLUCOSEU, Electronically signed by: Katie Gamble MD, 5/11/2019, 11:28 AM      [facility] [address]  Office : [phone]  Fax :[phone]

## 2019-05-11 NOTE — PROGRESS NOTES
Data- discharge order received, pt and son (appointed legal authority) verbalized agreement to discharge, disposition to [facility] #[phone], Jerry Srulevard reviewed and signed by physician. Action- AVS prepared, CHRISTINA completed/ reported faxed by case management/. Discharge instruction summary: Diet- Dysphagia II Mechanically Soft, nectar thick-fluid restrictions, Activity- up as tolerated, immunizations reviewed, medications prescriptions to be filled at receiving facility, Transfer code status: DNR-CC, LDAs removed. DME used: walker, wheelchair. Response- Bedside RN to call report to receiving facility. Pt belongings gathered, peripheral IV and cardiac monitoring removed. Disposition to Discharged via cart/stretcher and via ambulance to skilled nursing by EMS transportation, no complications reported. Handoff report given to EMS transport all questions answered. Son at bedside and updated on POC and in agreement with discharge plan. Pt. D/C'd in stable condition.  [address] 12:59 PM

## 2019-05-13 NOTE — PROGRESS NOTES
Occupational Therapy  Bernice Novak  Occupational Therapy Discharge Summary    Name: Bernice Novak  : 1925    The pt was evaluated by OT on 19 and seen for 2 treatment sessions prior to DC to ECF on 19 per MD order. The pt's acute therapy goals were:  Short term goals  Short term goal 1: min assist bathing from chair - min A UB bathing EOB 5/10  Short term goal 2: min assist functional transfers - max 5/10  Short term goal 3: min assist toileting - not addressed 5/10; nassar cath  Short term goal 4: increase am pac score from 10 to 14 - 10, 5/10        Patient met 0 goals during stay. Number of Refusals:0  Number of Holds: 0  During this hospitalization, the patient was educated on:  Patient Education: OT eval, transfers, orientation    DC pt from OT caseload at this time. Thank you!     Bonnie Valdovinos, OTR/L [id number]

## 2019-05-17 ENCOUNTER — TELEPHONE (OUTPATIENT)
Dept: OTHER | Age: 84
End: 2019-05-17

## 2019-05-24 ENCOUNTER — CARE COORDINATION (OUTPATIENT)
Dept: CASE MANAGEMENT | Age: 84
End: 2019-05-24

## 2019-05-24 NOTE — CARE COORDINATION
Care Transition Discharge from [facility] Follow Up     Call within 2 business days of discharge: Yes      Spoke with nurse Valentina at [facility]  Discharged From: [facility]  Date of discharge: 5/23/2019    Contacted [facility]. Was told patient was discharged to [facility] on 5/23/2019. [address] [facility]. Spoke with nurse Valentina. States patient is doing pretty well. He states they got her up to the wheelchair this morning. She ate 25% of her breakfast.  Oxygen currently off. O2 sats were in the 90's without her oxygen on. He states patient receiving skilled nursing, PT, OT. Patient will also be getting speech evaluation as well. Attempted to review medication list but nurse states he is busy and will fax over the list.  Fax number given to Jonatan Grace. No other questions or concerns at this time. 1. How have you been feeling since you were discharged from the post acute facility? He states patient is doing pretty well. Any intervention needed? No    2. Do you have all of your medications? Yes    3. Do you have any questions about your medicatons? No    4. Have you scheduled your follow up appointment? Yes    5. Is Home Health involved: Yes   [address]: [facility]      Has someone from home health been in contact with you? Yes        Who else is helping you at home? Lives in assisted living        Does anyone in your home depend on you for help? No    6. Do you feel like you have everything you need to keep you well at home? Yes    DME?   Oxygen, wheelchair      Follow up appointments:    Future Appointments   Date Time Provider Madelyn Smith   5/28/2019  1:15 PM Karrie Gauthier MD [facility]   6/7/2019  9:45 AM Jeny Cummins MD [facility] ARIELA Loaiza RN

## 2019-06-02 PROBLEM — R79.89 ELEVATED TROPONIN: Status: RESOLVED | Noted: 2019-05-03 | Resolved: 2019-06-02

## 2019-06-02 PROBLEM — R77.8 ELEVATED TROPONIN: Status: RESOLVED | Noted: 2019-05-03 | Resolved: 2019-06-02

## 2019-06-03 ENCOUNTER — APPOINTMENT (OUTPATIENT)
Dept: CT IMAGING | Age: 84
End: 2019-06-03
Payer: MEDICARE

## 2019-06-03 ENCOUNTER — APPOINTMENT (OUTPATIENT)
Dept: GENERAL RADIOLOGY | Age: 84
End: 2019-06-03
Payer: MEDICARE

## 2019-06-03 ENCOUNTER — HOSPITAL ENCOUNTER (OUTPATIENT)
Age: 84
Setting detail: OBSERVATION
Discharge: NURSING, REHAB OR LONG-TERM CARE | End: 2019-06-04
Attending: EMERGENCY MEDICINE | Admitting: INTERNAL MEDICINE
Payer: MEDICARE

## 2019-06-03 DIAGNOSIS — S32.040A CLOSED COMPRESSION FRACTURE OF FOURTH LUMBAR VERTEBRA, INITIAL ENCOUNTER: ICD-10-CM

## 2019-06-03 DIAGNOSIS — S82.891A CLOSED FRACTURE OF RIGHT ANKLE, INITIAL ENCOUNTER: ICD-10-CM

## 2019-06-03 DIAGNOSIS — F03.90 DEMENTIA WITHOUT BEHAVIORAL DISTURBANCE, UNSPECIFIED DEMENTIA TYPE: Primary | ICD-10-CM

## 2019-06-03 DIAGNOSIS — S82.891A FRACTURE, ANKLE, RIGHT, CLOSED, INITIAL ENCOUNTER: ICD-10-CM

## 2019-06-03 DIAGNOSIS — S32.10XA CLOSED FRACTURE OF SACRUM, UNSPECIFIED PORTION OF SACRUM, INITIAL ENCOUNTER (HCC): ICD-10-CM

## 2019-06-03 LAB
A/G RATIO: 0.9 (ref 1.1–2.2)
ABO/RH: NORMAL
ALBUMIN SERPL-MCNC: 3 G/DL (ref 3.4–5)
ALP BLD-CCNC: 173 U/L (ref 40–129)
ALT SERPL-CCNC: 13 U/L (ref 10–40)
ANION GAP SERPL CALCULATED.3IONS-SCNC: 9 MMOL/L (ref 3–16)
ANISOCYTOSIS: ABNORMAL
ANTIBODY SCREEN: NORMAL
APTT: 34.6 SEC (ref 26–36)
AST SERPL-CCNC: 27 U/L (ref 15–37)
BASOPHILS ABSOLUTE: 0 K/UL (ref 0–0.2)
BASOPHILS RELATIVE PERCENT: 0.8 %
BILIRUB SERPL-MCNC: 0.7 MG/DL (ref 0–1)
BILIRUBIN URINE: NEGATIVE
BLOOD, URINE: NEGATIVE
BUN BLDV-MCNC: 11 MG/DL (ref 7–20)
CALCIUM SERPL-MCNC: 9 MG/DL (ref 8.3–10.6)
CHLORIDE BLD-SCNC: 94 MMOL/L (ref 99–110)
CLARITY: CLEAR
CO2: 28 MMOL/L (ref 21–32)
COLOR: ABNORMAL
CREAT SERPL-MCNC: 0.6 MG/DL (ref 0.6–1.2)
CRENATED RBC'S: ABNORMAL
EOSINOPHILS ABSOLUTE: 0.1 K/UL (ref 0–0.6)
EOSINOPHILS RELATIVE PERCENT: 1.9 %
GFR AFRICAN AMERICAN: >60
GFR NON-AFRICAN AMERICAN: >60
GLOBULIN: 3.3 G/DL
GLUCOSE BLD-MCNC: 149 MG/DL (ref 70–99)
GLUCOSE URINE: NEGATIVE MG/DL
HCT VFR BLD CALC: 36 % (ref 36–48)
HEMOGLOBIN: 11.5 G/DL (ref 12–16)
INR BLD: 1.68 (ref 0.86–1.14)
KETONES, URINE: ABNORMAL MG/DL
LEUKOCYTE ESTERASE, URINE: NEGATIVE
LYMPHOCYTES ABSOLUTE: 0.9 K/UL (ref 1–5.1)
LYMPHOCYTES RELATIVE PERCENT: 16.2 %
MAGNESIUM: 2.2 MG/DL (ref 1.8–2.4)
MCH RBC QN AUTO: 26 PG (ref 26–34)
MCHC RBC AUTO-ENTMCNC: 32 G/DL (ref 31–36)
MCV RBC AUTO: 81.2 FL (ref 80–100)
MICROSCOPIC EXAMINATION: ABNORMAL
MONOCYTES ABSOLUTE: 0.6 K/UL (ref 0–1.3)
MONOCYTES RELATIVE PERCENT: 10.2 %
NEUTROPHILS ABSOLUTE: 4 K/UL (ref 1.7–7.7)
NEUTROPHILS RELATIVE PERCENT: 70.9 %
NITRITE, URINE: NEGATIVE
PDW BLD-RTO: 26.2 % (ref 12.4–15.4)
PH UA: 6 (ref 5–8)
PLATELET # BLD: 338 K/UL (ref 135–450)
PMV BLD AUTO: 7.1 FL (ref 5–10.5)
POIKILOCYTES: ABNORMAL
POTASSIUM REFLEX MAGNESIUM: 3.5 MMOL/L (ref 3.5–5.1)
PRO-BNP: 1036 PG/ML (ref 0–449)
PROTEIN UA: NEGATIVE MG/DL
PROTHROMBIN TIME: 19.1 SEC (ref 9.8–13)
RBC # BLD: 4.43 M/UL (ref 4–5.2)
SCHISTOCYTES: ABNORMAL
SLIDE REVIEW: ABNORMAL
SODIUM BLD-SCNC: 131 MMOL/L (ref 136–145)
SPECIFIC GRAVITY UA: 1.02 (ref 1–1.03)
TOTAL PROTEIN: 6.3 G/DL (ref 6.4–8.2)
TROPONIN: 0.01 NG/ML
URINE REFLEX TO CULTURE: ABNORMAL
URINE TYPE: ABNORMAL
UROBILINOGEN, URINE: 1 E.U./DL
WBC # BLD: 5.7 K/UL (ref 4–11)

## 2019-06-03 PROCEDURE — 73501 X-RAY EXAM HIP UNI 1 VIEW: CPT

## 2019-06-03 PROCEDURE — 83880 ASSAY OF NATRIURETIC PEPTIDE: CPT

## 2019-06-03 PROCEDURE — 72125 CT NECK SPINE W/O DYE: CPT

## 2019-06-03 PROCEDURE — 99285 EMERGENCY DEPT VISIT HI MDM: CPT

## 2019-06-03 PROCEDURE — 84484 ASSAY OF TROPONIN QUANT: CPT

## 2019-06-03 PROCEDURE — 80053 COMPREHEN METABOLIC PANEL: CPT

## 2019-06-03 PROCEDURE — 85610 PROTHROMBIN TIME: CPT

## 2019-06-03 PROCEDURE — 70450 CT HEAD/BRAIN W/O DYE: CPT

## 2019-06-03 PROCEDURE — 85025 COMPLETE CBC W/AUTO DIFF WBC: CPT

## 2019-06-03 PROCEDURE — 1200000000 HC SEMI PRIVATE

## 2019-06-03 PROCEDURE — 86900 BLOOD TYPING SEROLOGIC ABO: CPT

## 2019-06-03 PROCEDURE — 85730 THROMBOPLASTIN TIME PARTIAL: CPT

## 2019-06-03 PROCEDURE — 83735 ASSAY OF MAGNESIUM: CPT

## 2019-06-03 PROCEDURE — 73610 X-RAY EXAM OF ANKLE: CPT

## 2019-06-03 PROCEDURE — 86901 BLOOD TYPING SEROLOGIC RH(D): CPT

## 2019-06-03 PROCEDURE — 93005 ELECTROCARDIOGRAM TRACING: CPT | Performed by: PHYSICIAN ASSISTANT

## 2019-06-03 PROCEDURE — 71045 X-RAY EXAM CHEST 1 VIEW: CPT

## 2019-06-03 PROCEDURE — 73560 X-RAY EXAM OF KNEE 1 OR 2: CPT

## 2019-06-03 PROCEDURE — 72131 CT LUMBAR SPINE W/O DYE: CPT

## 2019-06-03 PROCEDURE — 81003 URINALYSIS AUTO W/O SCOPE: CPT

## 2019-06-03 PROCEDURE — 86850 RBC ANTIBODY SCREEN: CPT

## 2019-06-03 RX ORDER — TRAMADOL HYDROCHLORIDE 50 MG/1
50 TABLET ORAL EVERY 6 HOURS PRN
Status: ON HOLD | COMMUNITY
End: 2019-06-04 | Stop reason: SDUPTHER

## 2019-06-03 ASSESSMENT — PAIN SCALES - PAIN ASSESSMENT IN ADVANCED DEMENTIA (PAINAD)
FACIALEXPRESSION: 0
BREATHING: 0
NEGVOCALIZATION: 0
TOTALSCORE: 0
BODYLANGUAGE: 0
CONSOLABILITY: 0

## 2019-06-03 ASSESSMENT — PAIN SCALES - GENERAL: PAINLEVEL_OUTOF10: 0

## 2019-06-03 NOTE — ED NOTES
Bed: 03  Expected date:   Expected time:   Means of arrival:   Comments:  fabricio Gutierrez RN  06/03/19 1927

## 2019-06-03 NOTE — ED PROVIDER NOTES
Toni Smalls        Pt Name: Delfin Moss  MRN: [id number]  [place] 5/5/1925  Date of evaluation: 6/3/2019  Provider: VICKIE Cordova  PCP: Sujata Iverson MD    This patient was seen and evaluated by the attending physician [address] [place] [address]       Chief Complaint   Patient presents with    Ankle Pain     pr brought in by FF squad from [facility] after confirmed right ankle fx. pt with severe dementia and doesn't remember falling but is unreliable historian. Facility doesn't know when/where/how this may have happened. pt arrived with ankle splint in place from squad. squad reports pt also with c/o lower back, right hip, and knee pain on the way here. no known blood thinners. HISTORY OF PRESENT ILLNESS   (Location/Symptom, Timing/Onset, Context/Setting, Quality, Duration, Modifying Factors, Severity)  Note limiting factors. Delfin Moss is a 80 y.o. female with past medical history of previous atrial fibrillation, breast cancer, CHF, hypertension, hyperlipidemia and thyroid disease who presents to the ED with complaint of a questionable fall. Patient has severe dementia and unreliable historian at this time. States she does not remember falling. Brought from extended care facility after they noticed some bruising and swelling to her right ankle. X-ray facility showed ankle fracture and sent to the ED for further evaluation and treatment. Facility states they do not know of any fall or injury. Patient unreliable historian at this time. Patient denies any pain or symptoms currently. Denies headache, visual changes, speech disturbances, numbness/tingling, lightheaded/dizziness, chest pain, shortness of breath, abdominal pain, nausea/vomiting, urinary symptoms or changes in bowel movements. Denies any other pain at this time.   Squad states patient was complaining of low back pain, right hip pain and right knee pain as well as right ankle pain but patient denies this at this time. Patient very unreliable historian given severe dementia at this time. No documented blood thinners per medication list from facility. Nursing Notes were all reviewed and agreed with or any disagreements were addressed  in the HPI. REVIEW OF SYSTEMS    (2-9 systems for level 4, 10 or more for level 5)     Review of Systems   Unable to perform ROS: Dementia       Positives and Pertinent negatives as per HPI. Except as noted abovein the ROS, all other systems were reviewed and negative. PAST MEDICAL HISTORY     Past Medical History:   Diagnosis Date    [facility] ([facility]     Cancer ([facility]     breast cancer     CHF (congestive heart failure) (HCC)     Hyperlipidemia     Hypertension     Seasonal allergies     Thyroid disease          SURGICAL HISTORY     Past Surgical History:   Procedure Laterality Date    EYE SURGERY      HIP SURGERY Left 7-26-14    LEFT HIP HEMIARTHROPLASTY                 HYSTERECTOMY           CURRENTMEDICATIONS       Current Discharge Medication List      CONTINUE these medications which have NOT CHANGED    Details   traMADol (ULTRAM) 50 MG tablet Take 50 mg by mouth every 6 hours as needed for Pain.      sennosides-docusate sodium (SENOKOT-S) 8.6-50 MG tablet Take 2 tablets by mouth every 72 hours  Qty: 60 tablet, Refills: 1      Cholecalciferol (VITAMIN D3) 2000 units CAPS Take 2,000 capsules by mouth daily Takes 2 cap to equal 4,000      citalopram (CELEXA) 10 MG tablet TAKE ONE TABLET BY MOUTH DAILY  Qty: 30 tablet, Refills: 9      levothyroxine (SYNTHROID) 75 MCG tablet TAKE ONE TABLET BY MOUTH DAILY  Qty: 90 tablet, Refills: 2               ALLERGIES     Patient has no known allergies. FAMILYHISTORY     History reviewed. No pertinent family history. SOCIAL HISTORY       Social History     Socioeconomic History    Marital status:       Spouse name: None    Number of children: None    Years of normal. Right eye exhibits no discharge. Left eye exhibits no discharge. Neck: Normal range of motion. Neck supple. No JVD present. Cardiovascular: Normal heart sounds and intact distal pulses. Exam reveals no gallop and no friction rub. No murmur heard. Irregular rate and rhythm   Pulmonary/Chest: Effort normal and breath sounds normal. No stridor. No respiratory distress. She has no wheezes. She has no rales. She exhibits no tenderness. Abdominal: Soft. She exhibits no distension and no mass. There is no tenderness. There is no rebound and no guarding. Musculoskeletal: Normal range of motion. Air cast/splint noted to the right ankle. Edema and ecchymoses noted to the right ankle. Tender to palpation over the right ankle. No obvious deformity. Dorsalis pedis pulse and capillary refill brisk. Able to wiggle toes without difficulty. Achilles tendon intact. No tenderness over the right knee or right hip. No pelvis instability. No anterior chest wall tenderness. No TTP to the remaining upper and lower extremities throughout. No midline cervical, thoracic or lumbar spine tenderness. No crepitus or step-off. Gait deferred. Lymphadenopathy:     She has no cervical adenopathy. Neurological: She is alert. She has normal strength. No cranial nerve deficit or sensory deficit. GCS eye subscore is 4. GCS verbal subscore is 5. GCS motor subscore is 6. Gait deferred. Skin: Skin is warm and dry. No rash noted. She is not diaphoretic. No erythema. No pallor. Psychiatric: She has a normal mood and affect.  Her behavior is normal.       DIAGNOSTIC RESULTS   LABS:    Labs Reviewed   CBC WITH AUTO DIFFERENTIAL - Abnormal; Notable for the following components:       Result Value    Hemoglobin 11.5 (*)     RDW 26.2 (*)     Lymphocytes # 0.9 (*)     Anisocytosis 1+ (*)     Poikilocytes 1+ (*)     Schistocytes Occasional (*)     CRENATED RBC'S Occasional (*)     All other components within normal limits Narrative:     Performed at:  [facility] [address] [place], [place]   Phone [phone]   COMPREHENSIVE METABOLIC PANEL W/ REFLEX TO MG FOR LOW K - Abnormal; Notable for the following components:    Sodium 131 (*)     Chloride 94 (*)     Glucose 149 (*)     Total Protein 6.3 (*)     Alb 3.0 (*)     Albumin/Globulin Ratio 0.9 (*)     Alkaline Phosphatase 173 (*)     All other components within normal limits    Narrative:     Performed at:  [facility] [address] [place], [place]   Phone [phone]   PROTIME-INR - Abnormal; Notable for the following components:    Protime 19.1 (*)     INR 1.68 (*)     All other components within normal limits    Narrative:     Performed at:  [facility] [address] [place], [place]   Phone [phone]   URINE RT REFLEX TO CULTURE - Abnormal; Notable for the following components:    Ketones, Urine TRACE (*)     All other components within normal limits    Narrative:     Performed at:  [facility] [address] [place], [place]   Phone [phone]  - Abnormal; Notable for the following components:    Pro-BNP 1,036 (*)     All other components within normal limits    Narrative:     Performed at:  [facility] [address] [place], [place]   Phone [phone]   APTT    Narrative:     Performed at:  [facility] [address] [place], [place]   Phone [phone]   TROPONIN    Narrative:     Performed at:  [facility] [address], [place]   Phone [phone]   MAGNESIUM    Narrative:     Performed at:  [facility] [address], [place]   Phone [phone]   TYPE AND SCREEN    Narrative: Performed at:  [facility]  [address],  [place], [address]   Phone [phone]       All other labs were within normal range or not returned as of this dictation. EKG: All EKG's are interpreted by the Emergency Department Physician who either signs orCo-signs this chart in the absence of a cardiologist.  Please see their note for interpretation of EKG. RADIOLOGY:   Non-plain film images such as CT, Ultrasound and MRI are read by the radiologist. [facility] radiographic images are visualized andpreliminarily interpreted by the  ED Provider with the below findings:        Interpretation pert Radiologist below, if available at the time of this note:    CT Cervical Spine WO Contrast   Final Result   Head: No acute intracranial abnormality. Cervical spine: No acute abnormality of the cervical spine. CT Lumbar Spine WO Contrast   Final Result   Fractures of the sacrum as discussed. Compression fracture of the superior endplate of L4.         CT Head WO Contrast   Final Result   Head: No acute intracranial abnormality. Cervical spine: No acute abnormality of the cervical spine. XR CHEST PORTABLE   Final Result   Stable appearance of the chest without acute cardiopulmonary process evident. XR ANKLE RIGHT (MIN 3 VIEWS)   Final Result   Lateral soft tissue swelling with adjacent slightly displaced fracture of the   lateral malleolus         XR HIP 1 VW W PELVIS RIGHT   Final Result   No acute abnormality of the pelvis or right hip. XR KNEE RIGHT (1-2 VIEWS)   Final Result   Negative study. No results found.        PROCEDURES   Unless otherwise noted below, none     Procedures    CRITICAL CARE TIME   N/A    CONSULTS:  IP CONSULT TO INTERNAL MEDICINE      EMERGENCY DEPARTMENT COURSE and DIFFERENTIALDIAGNOSIS/MDM:   Vitals:    Vitals:    06/03/19 2330 06/04/19 0000 06/04/19 0030 06/04/19 0500   BP: (!) 156/65 (!) 151/62 (!) 178/80 (!) 169/74   Pulse:   67 67   Resp:   16 18   Temp:   96.8 °F (36 °C) 96.3 °F (35.7 °C)   TempSrc:   Temporal Temporal   SpO2: 95% 95% 93% 91%   Weight:   104 lb 9.6 oz (47.4 kg)    Height:           Patient was given thefollowing medications:  Medications   dextrose 5 % and 0.45 % sodium chloride infusion ( Intravenous New Bag 6/4/19 0105)   HYDROmorphone HCl PF (DILAUDID) injection 0.5 mg (has no administration in time range)   ondansetron (ZOFRAN) injection 4 mg (has no administration in time range)       Patient is a 70-year-old female who presents to the ED with complaint of a right ankle injury. Demented and unable to provide appropriate history. Apparently facility noticed edema and ecchymoses to the right ankle and performed x-ray which showed fracture so sent to the ED for further evaluation and treatment. Facility denies any injury or trauma. Patient unable to provide history. IV established and blood work obtained given what appears to be a questionable fall. Unclear ideology behind patient's fall was mechanical potentially due to a syncopal episode. Urinalysis unremarkable. CBC showed hemoglobin 11.5. White count and platelets unremarkable. CMP relatively unremarkable other than sodium 131 and chloride 94. INR 1.68. PTT unremarkable. Blood type is AB+. Troponin unremarkable. BNP 1036. Magnesium normal.  CT of the head and cervical spine showed no acute abnormality. CT lumbar spine showed fractures of the sacrum with compression fracture of the superior endplate of L4. Chest x-ray unremarkable. X-ray of the right ankle showed lateral soft tissue swelling with displaced fracture of the lateral malleolus. X-ray of the right hip and pelvis unremarkable. X-ray of the right knee unremarkable. EKG interpreted by attending.   Given history and physical examination patient is a 70-year-old demented lady with what appears to be a possible fall with fracture of L4, the sacrum and also of the right ankle. Patient placed in a splint. Case discussed with Dr. Herman Kerr, who admits for [facility], who agreed to admit the patient for further evaluation and treatment. FINAL IMPRESSION      1. Dementia without behavioral disturbance, unspecified dementia type    2.  Closed fracture of sacrum, unspecified portion of sacrum, initial encounter ([facility]    3. Closed compression fracture of fourth lumbar vertebra, initial encounter ([facility]    4. Closed fracture of right ankle, initial encounter          DISPOSITION/PLAN   DISPOSITION Admitted 06/03/2019 11:41:17 PM      PATIENT REFERREDTO:  Ирина Winston MD  [address] [place] [address]  [phone]            DISCHARGE MEDICATIONS:  Current Discharge Medication List          DISCONTINUED MEDICATIONS:  Current Discharge Medication List      STOP taking these medications       rivaroxaban (XARELTO) 15 MG TABS tablet Comments:   Reason for Stopping:         albuterol (PROVENTIL) (2.5 MG/3ML) 0.083% nebulizer solution Comments:   Reason for Stopping:         cetirizine (ZYRTEC) 5 MG tablet Comments:   Reason for Stopping:         guaiFENesin (ROBITUSSIN) 100 MG/5ML SOLN oral solution Comments:   Reason for Stopping:         ferrous sulfate 325 (65 Fe) MG tablet Comments:   Reason for Stopping:         polyethylene glycol (GLYCOLAX) packet Comments:   Reason for Stopping:         pantoprazole (PROTONIX) 40 MG tablet Comments:   Reason for Stopping:                      (Please note that portions ofthis note were completed with a voice recognition program.  Efforts were made to edit the dictations but occasionally words are mis-transcribed.)    VICKIE Hernandez (electronically signed)          VICKIE Rangel  06/04/19 6004

## 2019-06-04 ENCOUNTER — TELEPHONE (OUTPATIENT)
Dept: ORTHOPEDIC SURGERY | Age: 84
End: 2019-06-04

## 2019-06-04 VITALS
HEART RATE: 69 BPM | OXYGEN SATURATION: 94 % | SYSTOLIC BLOOD PRESSURE: 165 MMHG | BODY MASS INDEX: 19.25 KG/M2 | RESPIRATION RATE: 16 BRPM | TEMPERATURE: 97 F | WEIGHT: 104.6 LBS | HEIGHT: 62 IN | DIASTOLIC BLOOD PRESSURE: 71 MMHG

## 2019-06-04 PROBLEM — S82.61XD: Status: ACTIVE | Noted: 2019-06-04

## 2019-06-04 PROBLEM — S32.009A FRACTURE OF LUMBAR SPINE WITHOUT CORD INJURY, CLOSED, INITIAL ENCOUNTER (HCC): Status: ACTIVE | Noted: 2019-06-04

## 2019-06-04 LAB
ANION GAP SERPL CALCULATED.3IONS-SCNC: 9 MMOL/L (ref 3–16)
BUN BLDV-MCNC: 7 MG/DL (ref 7–20)
CALCIUM SERPL-MCNC: 8.9 MG/DL (ref 8.3–10.6)
CHLORIDE BLD-SCNC: 93 MMOL/L (ref 99–110)
CO2: 29 MMOL/L (ref 21–32)
CREAT SERPL-MCNC: <0.5 MG/DL (ref 0.6–1.2)
EKG ATRIAL RATE: 68 BPM
EKG DIAGNOSIS: NORMAL
EKG P-R INTERVAL: 244 MS
EKG Q-T INTERVAL: 494 MS
EKG QRS DURATION: 128 MS
EKG QTC CALCULATION (BAZETT): 525 MS
EKG R AXIS: -67 DEGREES
EKG T AXIS: 109 DEGREES
EKG VENTRICULAR RATE: 68 BPM
GFR AFRICAN AMERICAN: >60
GFR NON-AFRICAN AMERICAN: >60
GLUCOSE BLD-MCNC: 118 MG/DL (ref 70–99)
HCT VFR BLD CALC: 38.6 % (ref 36–48)
HEMOGLOBIN: 12.2 G/DL (ref 12–16)
MCH RBC QN AUTO: 25.8 PG (ref 26–34)
MCHC RBC AUTO-ENTMCNC: 31.6 G/DL (ref 31–36)
MCV RBC AUTO: 81.8 FL (ref 80–100)
PDW BLD-RTO: 26.4 % (ref 12.4–15.4)
PLATELET # BLD: 350 K/UL (ref 135–450)
PMV BLD AUTO: 7 FL (ref 5–10.5)
POTASSIUM SERPL-SCNC: 3.6 MMOL/L (ref 3.5–5.1)
PRO-BNP: 951 PG/ML (ref 0–449)
RBC # BLD: 4.72 M/UL (ref 4–5.2)
SODIUM BLD-SCNC: 131 MMOL/L (ref 136–145)
WBC # BLD: 4.8 K/UL (ref 4–11)

## 2019-06-04 PROCEDURE — 2500000003 HC RX 250 WO HCPCS: Performed by: INTERNAL MEDICINE

## 2019-06-04 PROCEDURE — 6370000000 HC RX 637 (ALT 250 FOR IP): Performed by: INTERNAL MEDICINE

## 2019-06-04 PROCEDURE — G0378 HOSPITAL OBSERVATION PER HR: HCPCS

## 2019-06-04 PROCEDURE — 36415 COLL VENOUS BLD VENIPUNCTURE: CPT

## 2019-06-04 PROCEDURE — 80048 BASIC METABOLIC PNL TOTAL CA: CPT

## 2019-06-04 PROCEDURE — 2580000003 HC RX 258: Performed by: INTERNAL MEDICINE

## 2019-06-04 PROCEDURE — 83880 ASSAY OF NATRIURETIC PEPTIDE: CPT

## 2019-06-04 PROCEDURE — 85027 COMPLETE CBC AUTOMATED: CPT

## 2019-06-04 PROCEDURE — 93010 ELECTROCARDIOGRAM REPORT: CPT | Performed by: INTERNAL MEDICINE

## 2019-06-04 PROCEDURE — 96374 THER/PROPH/DIAG INJ IV PUSH: CPT

## 2019-06-04 PROCEDURE — 99222 1ST HOSP IP/OBS MODERATE 55: CPT | Performed by: NURSE PRACTITIONER

## 2019-06-04 RX ORDER — LORATADINE 10 MG/1
10 TABLET ORAL DAILY
COMMUNITY

## 2019-06-04 RX ORDER — TRAMADOL HYDROCHLORIDE 50 MG/1
50 TABLET ORAL EVERY 6 HOURS PRN
Status: DISCONTINUED | OUTPATIENT
Start: 2019-06-04 | End: 2019-06-04 | Stop reason: HOSPADM

## 2019-06-04 RX ORDER — TRAMADOL HYDROCHLORIDE 50 MG/1
50 TABLET ORAL EVERY 6 HOURS PRN
Qty: 40 TABLET | Refills: 0 | Status: SHIPPED | OUTPATIENT
Start: 2019-06-04 | End: 2019-06-14

## 2019-06-04 RX ORDER — HYDROMORPHONE HYDROCHLORIDE 1 MG/ML
0.5 INJECTION, SOLUTION INTRAMUSCULAR; INTRAVENOUS; SUBCUTANEOUS EVERY 4 HOURS PRN
Status: DISCONTINUED | OUTPATIENT
Start: 2019-06-04 | End: 2019-06-04 | Stop reason: HOSPADM

## 2019-06-04 RX ORDER — SENNA AND DOCUSATE SODIUM 50; 8.6 MG/1; MG/1
2 TABLET, FILM COATED ORAL
Status: DISCONTINUED | OUTPATIENT
Start: 2019-06-04 | End: 2019-06-04 | Stop reason: HOSPADM

## 2019-06-04 RX ORDER — ONDANSETRON 2 MG/ML
4 INJECTION INTRAMUSCULAR; INTRAVENOUS EVERY 6 HOURS PRN
Status: DISCONTINUED | OUTPATIENT
Start: 2019-06-04 | End: 2019-06-04 | Stop reason: HOSPADM

## 2019-06-04 RX ORDER — CITALOPRAM 20 MG/1
10 TABLET ORAL DAILY
Status: DISCONTINUED | OUTPATIENT
Start: 2019-06-04 | End: 2019-06-04 | Stop reason: HOSPADM

## 2019-06-04 RX ORDER — CETIRIZINE HYDROCHLORIDE 10 MG/1
5 TABLET ORAL DAILY
Status: DISCONTINUED | OUTPATIENT
Start: 2019-06-04 | End: 2019-06-04 | Stop reason: HOSPADM

## 2019-06-04 RX ORDER — DEXTROSE AND SODIUM CHLORIDE 5; .45 G/100ML; G/100ML
INJECTION, SOLUTION INTRAVENOUS CONTINUOUS
Status: DISCONTINUED | OUTPATIENT
Start: 2019-06-04 | End: 2019-06-04 | Stop reason: HOSPADM

## 2019-06-04 RX ORDER — LEVOTHYROXINE SODIUM 0.07 MG/1
75 TABLET ORAL DAILY
Status: DISCONTINUED | OUTPATIENT
Start: 2019-06-04 | End: 2019-06-04 | Stop reason: HOSPADM

## 2019-06-04 RX ADMIN — CETIRIZINE HYDROCHLORIDE 5 MG: 10 TABLET, FILM COATED ORAL at 12:14

## 2019-06-04 RX ADMIN — SENNOSIDES AND DOCUSATE SODIUM 2 TABLET: 8.6; 5 TABLET ORAL at 12:14

## 2019-06-04 RX ADMIN — CITALOPRAM HYDROBROMIDE 10 MG: 20 TABLET ORAL at 12:14

## 2019-06-04 RX ADMIN — HYDROMORPHONE HYDROCHLORIDE 0.5 MG: 1 INJECTION, SOLUTION INTRAMUSCULAR; INTRAVENOUS; SUBCUTANEOUS at 09:00

## 2019-06-04 RX ADMIN — DEXTROSE AND SODIUM CHLORIDE: 5; 450 INJECTION, SOLUTION INTRAVENOUS at 01:05

## 2019-06-04 RX ADMIN — TRAMADOL HYDROCHLORIDE 50 MG: 50 TABLET, FILM COATED ORAL at 12:14

## 2019-06-04 RX ADMIN — LEVOTHYROXINE SODIUM 75 MCG: 75 TABLET ORAL at 12:14

## 2019-06-04 ASSESSMENT — PAIN SCALES - PAIN ASSESSMENT IN ADVANCED DEMENTIA (PAINAD)
TOTALSCORE: 0
TOTALSCORE: 0
BODYLANGUAGE: 0
BODYLANGUAGE: 0
TOTALSCORE: 0
BODYLANGUAGE: 1
NEGVOCALIZATION: 0
FACIALEXPRESSION: 0
FACIALEXPRESSION: 0
TOTALSCORE: 0
FACIALEXPRESSION: 0
BODYLANGUAGE: 0
BODYLANGUAGE: 0
BODYLANGUAGE: 1
BREATHING: 0
BODYLANGUAGE: 0
CONSOLABILITY: 0
BREATHING: 0
TOTALSCORE: 0
NEGVOCALIZATION: 0
CONSOLABILITY: 0
BREATHING: 0
NEGVOCALIZATION: 1
FACIALEXPRESSION: 0
FACIALEXPRESSION: 0
TOTALSCORE: 4
BREATHING: 0
BREATHING: 0
FACIALEXPRESSION: 2
CONSOLABILITY: 0
FACIALEXPRESSION: 0
TOTALSCORE: 2
BREATHING: 0
BREATHING: 0
NEGVOCALIZATION: 0
NEGVOCALIZATION: 0
FACIALEXPRESSION: 0
NEGVOCALIZATION: 0
NEGVOCALIZATION: 0
BODYLANGUAGE: 0
CONSOLABILITY: 0
BREATHING: 0
BODYLANGUAGE: 0
NEGVOCALIZATION: 0
TOTALSCORE: 4
FACIALEXPRESSION: 0
BREATHING: 0
BREATHING: 0
TOTALSCORE: 0
FACIALEXPRESSION: 2
CONSOLABILITY: 0
BODYLANGUAGE: 1
NEGVOCALIZATION: 1
FACIALEXPRESSION: 0
TOTALSCORE: 1
CONSOLABILITY: 0
NEGVOCALIZATION: 1
CONSOLABILITY: 0
TOTALSCORE: 0
CONSOLABILITY: 0
BODYLANGUAGE: 0
BREATHING: 0
NEGVOCALIZATION: 1

## 2019-06-04 ASSESSMENT — PAIN SCALES - GENERAL
PAINLEVEL_OUTOF10: 4
PAINLEVEL_OUTOF10: 4

## 2019-06-04 NOTE — FLOWSHEET NOTE
Shift assessment compete. VSS. Pt. Is alert and resting comfortably in bed. Pt. Only alert to self at this time. Pt. States that her \"bottom\" is sore. Pt. Noted to be grimacing at times due to pain. No other complaints at this time. Family called and updated on POC  And state understanding and are in agreement with plan. Bed alarm engaged. Camera in use. Call light and bedside table within reach. 06/04/19 0845   Vital Signs   Temp 97 °F (36.1 °C)   Temp Source Temporal   Pulse 79   Heart Rate Source Brachial   Resp 18   BP (!) 141/57   BP Location Left upper arm   MAP (mmHg) 85   Patient Position Sitting   Level of Consciousness 0   MEWS Score 1   Patient Currently in Pain Yes   Pain Assessment   Pain Assessment Advanced Dementia   PAINAD (Pain Assessment in Advance Dementia)   Breathing 0   Negative Vocalization 1   Facial Expression 0   Body Language 1   Consolability 0   PAINAD Score 2   Oxygen Therapy   SpO2 96 %   Pulse Oximeter Device Mode Intermittent   Pulse Oximeter Device Location Finger   O2 Device None (Room air)   Will continue to monitor.

## 2019-06-04 NOTE — ED NOTES
Report given to 3A RN, all questions answered. Pt will be transferred to 3A in stretcher with all of her belongings by [facility].      Marlena Mejias RN  06/04/19 9648

## 2019-06-04 NOTE — CARE COORDINATION
Discharge Planning Assessment  SW spoke with pt's son discuss reason for admission, current living situation, and potential needs at the time of discharge.     Demographics/Insurance verified Yes/Yes     Current type of dwelling: Lives in [address]  Patient from ECF/SW confirmed with: n/a     Living arrangements: Merged with [facility], was at [facility] for 2 weeks in May     Level of function/Support: Depends on assisted living staff.     PCP: Dr. Anya Ames Visit to PCP: 2019     DME: gale Ortega     Active with any community resources/agencies/skilled home care: no     Medication compliance issues: no     Financial issues that could impact healthcare: no     Transportation at the time of discharge: Will need transport     Tentative discharge plan:  SW spoke with son regarding discharge planning. Son would prefer for pt to return to AL. SW also spoke with AL staff that state they are having [facility] meeting to sign pt on to hospice tomorrow and are able to accept pt back to facility. They state they have tried PT/OT with her but pt \"slides down in chair and prefers to no participate. \"    De Guzmaned MSW, [address]

## 2019-06-04 NOTE — PROGRESS NOTES
This RN attempted to call pt. Son about discharge today and message left with son discussing discharge plans. [address] [facility] [facility] called and this RN spoke with Oumar Strauss about discharge today and post discharge care of right ankle and nurse states understanding. All questions answered.  time set for 530pm. Will continue to monitor.  Trace Dalton 3:20 PM

## 2019-06-04 NOTE — TELEPHONE ENCOUNTER
FF CONSULT  Room 303  Dr Rahul Zepeda  Nurse Alejo Ulloa  [phone]    Lateral Malleolus fx    LEONARD notified

## 2019-06-04 NOTE — CONSULTS
[facility] Orthopedic Surgery  Consult Note    Patient: Albert Ortega Date: 6/3/2019  Requesting Physician: Amrita George MD  Room: [facility]-4685/6473-25    Chief complaint: RIGHT ankle pain    HPI: Mary Thorne is a 80 y.o. female who presented to [facility] 6/3/19 from [facility] for a confirmed RIGHT ankle fracture. She has dementia and is unable to provide history of fall, nor can facility. PMH includes atrial fibrillation on home Xarelto,  breast cancer, CHF, HTN, dementia. She was also found to have bilateral sacral ala fracture and compression fracture of endplate of L4. Describes pain in right ankle but cannot quantify or describe. Imaging review of RIGHT ankle demonstrated:   Lateral soft tissue swelling with adjacent slightly displaced fracture of the   lateral malleolus     Medical History:  Past Medical History:   Diagnosis Date    Atrial fibrillation ([address]     Cancer (HCC)     breast cancer     CHF [facility]congestive heart failure) [facility]     Hyperlipidemia     Hypertension     Seasonal allergies     Thyroid disease      Past Surgical History:   Procedure Laterality Date    EYE SURGERY      HIP SURGERY Left 7-26-14    LEFT HIP HEMIARTHROPLASTY                 HYSTERECTOMY         Social History:    reports that she has never smoked. She has never used smokeless tobacco.    Family History:  History reviewed. No pertinent family history. Medications:  ALL MEDICATIONS HAVE BEEN REVIEWED:  Scheduled:  Continuous:   dextrose 5 % and 0.45 % NaCl 100 mL/hr at 06/04/19 0105     PRN:HYDROmorphone, ondansetron    Allergies: No Known Allergies    Review of Systems:  Constitutional: Negative for fever, chills, fatigue. Skin:  Negative for pruritis, rash  Eyes: Negative for photophobia and visual disturbance. ENT:  Negative for rhinorrhea, epistaxis, sore throat  Respiratory:  Negative for cough and shortness of breath. Cardiovascular: Negative for chest pain. Gastrointestinal: Negative for nausea, vomiting, diarrhea. Genitourinary: Negative for dysuria and difficulty urinating. Neurological: Negative, dysarthria, tremors, seizures. Positive for confusion  Psychiatric:  Negative for depression or anxiety  Musculoskeletal:  Positive for right ankle discomfort    Objective:  Vitals:    06/04/19 0845   BP: (!) 141/57   Pulse: 79   Resp: 18   Temp: 97 °F (36.1 °C)   SpO2: 96%      Physical Examination:  GENERAL: No apparent distress, thin  SKIN:  Warm and dry  EYES: Nonicteric. ENT: Mucous membranes moist  HEAD: Normocephalic, atraumatic  RESPIRATORY: Resp easy and unlabored  CARDIOVASCULAR: Regular rate and rhythm  GI: Abdomen soft, nontender  NEURO: Awake and alert. No speech defect  PSYCHIATRIC: Appropriate affect; not agitated  MUSCULOSKELETAL:  RIGHT LE; ankle  Inspection: Posterior splint appears well fitting; elevated ankle on pillow  Motor: wiggles all toes  Sensation: intact to all toes  Vascular:  Cap refill < 2 sec  Sensory:    Right Upper Extremity:  normal  Left Upper Extremity:  normal  Right Lower Extremity:  normal  Left Lower Extremity:  normal    Labs reviewed:  Recent Labs     06/03/19 2019   WBC 5.7   HGB 11.5*   HCT 36.0        Recent Labs     06/03/19 2019   *   K 3.5   CL 94*   CO2 28   BUN 11   CREATININE 0.6   GLUCOSE 149*   CALCIUM 9.0   MG 2.20     Recent Labs     06/03/19 2019   INR 1.68*   PROTIME 19.1*       Lab Results   Component Value Date    COLORU DK YELLOW 06/03/2019    CLARITYU Clear 06/03/2019    PHUR 6.0 06/03/2019    GLUCOSEU Negative 06/03/2019    BLOODU Negative 06/03/2019    LEUKOCYTESUR Negative 06/03/2019    BILIRUBINUR Negative 06/03/2019    UROBILINOGEN 1.0 06/03/2019    RBCUA None seen 07/25/2014    WBCUA 0-2 07/25/2014    BACTERIA Rare (A) 07/25/2014       Imaging:  CT Cervical Spine WO Contrast   Final Result   Head: No acute intracranial abnormality.       Cervical spine: No acute abnormality of the cervical spine. CT Lumbar Spine WO Contrast   Final Result   Fractures of the sacrum as discussed. Compression fracture of the superior endplate of L4.         CT Head WO Contrast   Final Result   Head: No acute intracranial abnormality. Cervical spine: No acute abnormality of the cervical spine. XR CHEST PORTABLE   Final Result   Stable appearance of the chest without acute cardiopulmonary process evident. XR ANKLE RIGHT (MIN 3 VIEWS)   Final Result   Lateral soft tissue swelling with adjacent slightly displaced fracture of the   lateral malleolus         XR HIP 1 VW W PELVIS RIGHT   Final Result   No acute abnormality of the pelvis or right hip. XR KNEE RIGHT (1-2 VIEWS)   Final Result   Negative study. IMPRESSION:  Lateral malleolus fracture, slight displacement  Dementia  Atrial fibrillation on Home Xarelto  Active Problems:    Fracture, ankle, right, closed, initial encounter  Resolved Problems:    * No resolved hospital problems. *      RECOMMENDATIONS:  Closed treatment of fracture with posterior splint; may transition to boot in 2 weeks with weight bearing  Elevate ankle prn  Pain control per primary team  PT/OT  Social Work consult for discharge disposition  F U in 2 weeks with Dr. Maren Aguirre    Patient does not use tobacco products. I have reviewed imaging and plan with Dr. Maren Aguirre.         AL REY, APRN-CNP  6/4/2019  10:02 AM

## 2019-06-04 NOTE — TELEPHONE ENCOUNTER
Nakia from [facility] FF calling on behalf of Dr. Gennaro Roberts for an Consultation with Dr. Harleen Mayo. Please contact [phone].

## 2019-06-04 NOTE — DISCHARGE INSTR - COC
Continuity of Care Form    Patient Name: Caryn Anaya   :  1925  MRN:  [id number]    Admit date:  6/3/2019  Discharge date:  19    Code Status Order: Prior   Advance Directives:   [facility] Documentation     Date/Time Healthcare Directive Type of Healthcare Directive Copy in [address] Agent's Name Healthcare Agent's Phone Number    [phone]  Yes, patient has an advance directive for healthcare treatment  Durable power of  for health care  No, copy requested from clinic [facility] in chart, need healthcare directive paperwork   Healthcare power of   Denies Zara  --          Admitting Physician:  Dami Copeland MD  PCP: Josephine Tan MD    Discharging Nurse: [facility] Unit/Room#: 6LD-3547/7252-12  Discharging Unit Phone Number: [phone]    Emergency Contact:   Extended Emergency Contact Information  Primary Emergency Contact: Ana Luisa Schumacher [address] Phone: [phone]  Mobile Phone: [phone]  Relation: Child    Past Surgical History:  Past Surgical History:   Procedure Laterality Date    EYE SURGERY      HIP SURGERY Left 14    LEFT HIP HEMIARTHROPLASTY                 HYSTERECTOMY         Immunization History:   Immunization History   Administered Date(s) Administered    Influenza Virus Vaccine 10/24/2011, 10/22/2012, 10/21/2013    Influenza, High Dose (Fluzone 65 yrs and older) 2014, 10/12/2015, 10/11/2016, 10/03/2017, 10/18/2018    Pneumococcal 13-valent Conjugate (Vester Loveless) 10/12/2015    Pneumococcal Polysaccharide (Sdwkawayp98) 2017       Active Problems:  Patient Active Problem List   Diagnosis Code    Other specified acquired hypothyroidism E03.8    A-fib ([facility] I48.91    HTN (hypertension) I10    Hip fracture, left ([facility] S72.002A    CHF (congestive heart failure), NYHA class I, acute on chronic, combined ([facility] I50.43    Hyponatremia E87.1    Yes  · Bladder: No  Urinary Catheter: None   Colostomy/Ileostomy/Ileal Conduit: No       Date of Last BM: 06/04/19    Intake/Output Summary (Last 24 hours) at 6/4/2019 1350  Last data filed at 6/4/2019 1200  Gross per 24 hour   Intake 480 ml   Output 1200 ml   Net -720 ml     I/O last 3 completed shifts:  In: -   Out: 1200 [Urine:1200]    Safety Concerns: At Risk for Falls and Aspiration Risk    Impairments/Disabilities:      Speech, Vision and Hearing    Nutrition Therapy:  Current Nutrition Therapy:   - Oral Diet:  General    Routes of Feeding: Oral  Liquids: No Restrictions  Daily Fluid Restriction: no  Last Modified Barium Swallow with Video (Video Swallowing Test): not done    Treatments at the Time of Hospital Discharge:   Respiratory Treatments:   Oxygen Therapy:  is on oxygen at 2 L/min per nasal cannula. as needed  Ventilator:    - No ventilator support    Rehab Therapies: Physical Therapy, Occupational Therapy and Speech/Language Therapy  Weight Bearing Status/Restrictions: Non weight bearing RIGHT LE  Other Medical Equipment (for information only, NOT a DME order):  wheelchair, walker, bedside commode and hospital bed  Other Treatments:  Wound Care: If pt has a splint and dressing, DO NOT REMOVE. This will be removed at office visit. Keep clean and dry. Elevate ankle prn    DVT Prevention: N/A    Follow up with: Dr. Chao Shell  2 weeks post op. Call [phone] for appointment.     Patient's personal belongings (please select all that are sent with patient):  Glasses, Hearing Aides bilateral, Dentures upper and lower    RN SIGNATURE:  Electronically signed by Zoe Polo RN on 6/4/19 at 1:59 PM    CASE MANAGEMENT/SOCIAL WORK SECTION    Inpatient Status Date: ***    Readmission Risk Assessment Score:  Readmission Risk              Risk of Unplanned Readmission:        12           Discharging to Facility/ Agency   · Name:   · Address:  · Phone:  · Fax:    Dialysis Facility (if applicable)   · Name:  · Address:  · Dialysis Schedule:  · Phone:  · Fax:    / signature: {Esignature:[id number]}    PHYSICIAN SECTION    Prognosis: Guarded    Condition at Discharge: Stable    Rehab Potential (if transferring to Rehab): Guarded    Recommended Labs or Other Treatments After Discharge: PT OT , Orthopedic surgery follow up in 3 weeks     Physician Certification: I certify the above information and transfer of Ioana Bravo  is necessary for the continuing treatment of the diagnosis listed and that she requires Intermediate Nursing Care for greater 30 days.      Update Admission H&P: No change in H&P    PHYSICIAN SIGNATURE:  Electronically signed by Emil Cristina MD on 6/4/19 at 3:59 PM

## 2019-06-04 NOTE — ED PROVIDER NOTES
I independently performed a history and physical on Maggie Rodriguez. All diagnostic, treatment, and disposition decisions were made by myself in conjunction with the advanced practice provider. I have participated in the medical decision making and directed the treatment plan and disposition of the patient. For further details of [facility] emergency department encounter, please see the advanced practice provider's documentation. CHIEF COMPLAINT  Chief Complaint   Patient presents with    Ankle Pain     pr brought in by FF squad from [facility] place after confirmed right ankle fx. pt with severe dementia and doesn't remember falling but is unreliable historian. Facility doesn't know when/where/how this may have happened. pt arrived with ankle splint in place from squad. squad reports pt also with c/o lower back, right hip, and knee pain on the way here. no known blood thinners. Briefly, Maggie Rodriguez is a 80 y.o. female  who presents to the ED complaining of severe dementia so history is unobtainable patient. Sent from nursing home with unwitnessed potential fall. The patient is generally without complaints unless you palpate her right lower extremity, back and right hip. Her right ankle has notable bruising. FOCUSED PHYSICAL EXAMINATION  BP (!) 148/60   Pulse 71   Temp 97.9 °F (36.6 °C) (Axillary)   Resp 16   Ht 5' 2\" (1.575 m)   Wt 112 lb 4.8 oz (50.9 kg)   SpO2 95%   BMI 20.54 kg/m²    Focused physical examination notable for no acute distress, well-appearing, well-nourished, demented, right ankle is bruised and tender and swollen compared to the left, she has no apparent tenderness when I palpate the cervical thoracic or lumbar spine and the pelvis is stable with bilateral hips nontender.     The 12 lead EKG was interpreted by me as follows:  Rate: normal with a rate of 68  Rhythm: sinus  Axis: left deviation  Intervals: left bundle branch block which limits further interpretation of EKG changes, first deg AVB  Prior EKG comparison: EKG dated 5/3/19 is not significantly different    MDM:  ED course was notable for dementia with unwitnessed fall. Etiology is unclear. Patient is found to have a right ankle fracture, L4 fracture and sacrum fracture although the age of the L4 and sacral fractures are indeterminate. The patient has acute bruising in the right ankle. Remainder of trauma imaging is negative. Blood work and urine are unremarkable. Patient will be splinted and admitted. CLINICAL IMPRESSION  1. Dementia without behavioral disturbance, unspecified dementia type    2. Closed fracture of sacrum, unspecified portion of sacrum, initial encounter ([facility] [address]    3. Closed compression fracture of fourth lumbar vertebra, initial encounter ([facility] [facility]    4. Closed fracture of right ankle, initial encounter        [address] was admitted in fair condition. This chart was created using Dragon dictation software. Efforts were made by me to ensure accuracy, however some errors may be present due to limitations of this technology.             Leandro Rios MD  06/04/19 0526

## 2019-06-04 NOTE — PROGRESS NOTES
4 Eyes Skin Assessment     The patient is being assess for    Admission    I agree that 2 RN's have performed a thorough Head to Toe Skin Assessment on the patient. ALL assessment sites listed below have been assessed. Areas assessed by both nurses: [x]   Head, Face, and Ears   [x]   Shoulders, Back, and Chest  [x]   Arms, Elbows, and Hands   [x]   Coccyx, Sacrum, and IschIum  [x]   Legs, Feet, and Heels        Does the Patient have Skin Breakdown? Stage 1 pressure ulcer on coccyx, blanchable redness on L heel, generalized bruising.          Charli Prevention initiated:  Yes   Wound Care Orders initiated:  No      [facility] nurse consulted for Pressure Injury (Stage 3,4, Unstageable, DTI, NWPT, and Complex wounds), New and Established Ostomies:  No      Nurse 1 eSignature: Electronically signed by Navin Oshea RN on 6/4/19 at 12:51 AM    **SHARE this note so that the co-signing nurse is able to place an eSignature**    Nurse 2 eSignature: Electronically signed by Nicol Pickard RN on 6/4/19 at 1:14 AM

## 2019-06-04 NOTE — ED NOTES
Called pt's son, Zuri Pitt, back to update on pt status and POC. Explained that she was brought in with possible R ankle fracture, which our xray confirms. Explained that facility wasn't sure when or what happened, they just noticed it was swollen. Son reports when he seen her on Saturday, he noticed that her R ankle was swollen and asked her about it. Pt didn't know what happened. Son reports that since she was admitted here recently for pneumonia, she hasn't been ambulating on her own. States she's been living at assisted living and receiving therapy. Also reports she's been wanting to sleep a lot since she was discharged.       Alfonso Harrington RN  06/03/19 6428

## 2019-06-05 NOTE — H&P
[facility]                     [facility], [address]                              HISTORY AND PHYSICAL    PATIENT NAME: Colotn Vaughn                     :        1925  MED REC NO:   [id number]                          ROOM:       5154  ACCOUNT NO:   [de-identified]                           ADMIT DATE: 2019  PROVIDER:     Neil Segundo MD    This is a composite dictation of H and P and discharge summary. HISTORY OF PRESENT ILLNESS:  The patient is a 60-year-old white lady  came to the emergency room following an accidental fall at [facility]. The patient was brought in by the squad. The  patient has a right-sided ankle swelling and deformity with inability to  walk. The patient has severe dementia. She does not even remember  falling. There is no documented loss of consciousness, no seizure  activity. The patient does have chronic incontinence. There is no  chest pain, no shortness of breath. No fever, no chills. PAST MEDICAL HISTORY:  Pertinent for senile dementia, atrial  fibrillation, seasonal allergic rhinitis, hypothyroidism, congestive  heart failure, cancer, hyperlipidemia, hypertension. PAST SURGICAL HISTORY:  Pertinent for cataract removal, hysterectomy and  hip surgery. MEDICATIONS:  Vitamin D3, Celexa, Synthroid, Claritin, docusate. ALLERGIES:  No known allergies. SOCIAL HISTORY:  She is a . She has one son. There is no history  of smoking, no history of drinking. She used to work for [employer] as a . 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 There is no history of substance abuse. FAMILY HISTORY:  Both her parents are  because of natural  causes. No further family history available. REVIEW OF SYSTEMS:  Negative for loss of consciousness. No seizure  activity. No TIA. The patient has very unclear speech.   There may be  some oropharyngeal dysphagia, no blurring of vision. No hematemesis. No melena. No abdominal pain. No angina pectoris. Does have  exertional shortness of breath. The patient ambulates with assistance. She has to use a walker, but most of the time she is in a wheelchair  bound status. Denies any abdominal pain. No genitourinary complaint  except chronic incontinence of urine and feces. PHYSICAL EXAMINATION:  GENERAL:  Alert, awake, oriented x0 pleasant 80year-old white woman  with totally unclear speech. VITAL SIGNS:  Temperature 97.9, blood pressure 130/59, respirations 16,  heart rate 71. HEENT:  Unremarkable. Oral mucosa dry. SKIN:  Warm and dry. NECK:  Supple. Faint carotid bruit. No jugular venous distention. No  lymphadenopathy. CHEST:  No chest wall tenderness. LUNGS:  Vesicular breath sounds. Fairly clear to auscultation. HEART:  Regular rate and rhythm. S1, S2 without any S3 or S4 gallop. ABDOMEN:  Soft and nontender. Bowel sounds present. EXTREMITIES:  Shows tenderness in the right ankle, more pronounced  laterally with swelling. There is a restricted range of motion,  exquisite tenderness on the lateral malleolus. There is no distal  neurovascular deficit. NEUROLOGIC:  The patient has severe generalized neuromuscular weakness,  total lack of coordination. LABORATORY EVALUATION:  Shows sodium of 131, potassium 3.6, chloride 93,  CO2 29, GFR more than 60. Blood sugar 118. White blood cell count 4.8. Urinalysis, negative for acute UTI. ProBNP level 1036. Troponin 0.01. BUN 11, creatinine 0.6. Hemoglobin 11.5, platelet count 385. PT INR is  19.1 and 1.66. Urinalysis is negative for acute UTI, negative for  leukocyte esterase. DIAGNOSTIC DATA:  CT scan of the head without contrast shows no evidence  of acute intracranial finding, no hemorrhage or infarction. CT scan of  the cervical spine is totally unremarkable.   The patient also had a CT  scan of the lumbar spine that showed fracture of the sacrum with  compression fracture of the superior endplate of L4. Chest x-ray  portable shows no evidence of acute intrathoracic disease, stable  appearance of the chest, no acute cardiopulmonary event. X-ray of the  right knee shows no evidence of fracture or dislocation, it was a  negative study. X-ray of the right ankle shows lateral malleolus  fracture which is not displaced. There is a lateral soft tissue  swelling _____ slight displacement of the lateral malleolus. X-ray of  the hip was done that showed no evidence of acute abnormality. ASSESSMENT:  Fracture of the lateral malleolus, fracture of the lumbar  spine, fracture of the sacrum, hyponatremia, senile dementia,  hypertension, ataxia. PLAN:  Get her admitted. The patient was put in observation. Orthopedic Surgery consultation was obtained. They decided to go  through the nonoperative measures, apply below the knee splint. PT, OT  will be involved. The patient was given parenteral pain relief and  antiemetics and physical therapy initiation was also obtained. The  patient was dismissed in stable condition to [facility] where the patient  will be receiving therapy in her room. As such, she is in assisted  living there. Necessary arrangements were made by our . The patient was discharged in stable condition. The patient was in  observational status. Discharge medications were continuation of home  meds plus tramadol 50 q. 6 hours p.r.n. As always, it is a pleasure to take care of your patients at [address] [place] [address]         Ruth Don MD    D: 06/04/2019 16:06:36       T: 06/04/2019 21:15:05     [email or web address]  Job#: [id number]     Doc#: [id number]    CC:  Shantel Connors

## 2019-06-08 PROBLEM — R09.02 HYPOXEMIA: Status: RESOLVED | Noted: 2019-05-03 | Resolved: 2019-06-08

## 2019-06-08 PROBLEM — E87.1 HYPONATREMIA: Status: RESOLVED | Noted: 2019-05-03 | Resolved: 2019-06-08

## 2019-06-08 PROBLEM — J15.9 COMMUNITY ACQUIRED BACTERIAL PNEUMONIA: Status: RESOLVED | Noted: 2019-05-03 | Resolved: 2019-06-08

## 2019-07-04 NOTE — PROGRESS NOTES
Patient seen , discharge dictated scripts given , arrangements made , CHRISTINA completed .  Discussed with nursing staff  And   If applicable ,  Discussed with  Patient's family , all questions answered and concerns addressed  When applicable

## 2019-07-05 NOTE — DISCHARGE SUMMARY
[address]                     [address] [facility], [address]                               DISCHARGE SUMMARY    PATIENT NAME: Marcial Hammonds                     :        1925  MED REC NO:   [id number]                          ROOM:       3014  ACCOUNT NO:   [de-identified]                           ADMIT DATE: 2019  PROVIDER:     David Whitfield MD                  DISCHARGE DATE:  2019    This was an observational stay. FINAL DIAGNOSES:  1. Fracture of the lateral malleolus of the right ankle. 2.  Fracture of the lumbar spine. 3.  Fracture of the sacrum. 4.  Hyponatremia. 5.  Senile dementia. 6.  Hypertension. 7.  Ataxia. DISCHARGE MEDICATIONS:  1. Claritin 10 mg once a day. 2.  Tramadol 50 mg every six hours p.r.n.  3.  Senokot-S 1 p.o. b.i.d.  4.  Citalopram 10 mg once a day. 5.  Levothyroxine 75 mcg once a day. 6.  Xarelto 15 mg once a day. 7.  Albuterol nebulized aerosol every four hours p.r.n.  8.  Vitamin D3 2000 units daily. HOSPITAL COURSE:  This elderly woman came to the emergency room  following a fall. The patient had a fracture of the lateral malleolus  which was very stable. It did not need operative intervention. Vital  signs are stable. Blood pressure was 130/59, respirations are 16. CT  scan of the head shows no evidence of acute intracranial finding. There  was no hemorrhage or infarction. CT of the cervical spine was also  unremarkable. There was a compression fracture of the superior endplate  of the L4. The patient underwent orthopedic surgery consultation. The  patient was given parenteral pain relief, antiemetics, PT/OT evaluation. After being observed, the patient was put in a below-the-knee splint. He will be continued on PT, OT and involve in therapy. The patient was  discharged back to [facility].         Jaison Camarillo MD    D: 2019 13:41:15       T: 2019 2:50:25

## 2020-12-28 ENCOUNTER — HOSPITAL ENCOUNTER (INPATIENT)
Age: 85
LOS: 6 days | Discharge: ANOTHER HOSPITAL | DRG: 481 | End: 2021-01-04
Attending: EMERGENCY MEDICINE | Admitting: ORTHOPAEDIC SURGERY
Payer: MEDICARE

## 2020-12-28 DIAGNOSIS — S72.011A CLOSED SUBCAPITAL FRACTURE OF RIGHT FEMUR, INITIAL ENCOUNTER (HCC): Primary | ICD-10-CM

## 2020-12-28 DIAGNOSIS — F03.90 DEMENTIA WITHOUT BEHAVIORAL DISTURBANCE, UNSPECIFIED DEMENTIA TYPE: ICD-10-CM

## 2020-12-28 DIAGNOSIS — S72.001A HIP FRACTURE, RIGHT, CLOSED, INITIAL ENCOUNTER (HCC): ICD-10-CM

## 2020-12-28 DIAGNOSIS — R09.02 HYPOXIA: ICD-10-CM

## 2020-12-28 DIAGNOSIS — R29.6 UNWITNESSED FALL: ICD-10-CM

## 2020-12-28 LAB
CHP ED QC CHECK: YES
GLUCOSE BLD-MCNC: 115 MG/DL
GLUCOSE BLD-MCNC: 115 MG/DL (ref 70–99)
PERFORMED ON: ABNORMAL

## 2020-12-28 PROCEDURE — 99284 EMERGENCY DEPT VISIT MOD MDM: CPT

## 2020-12-28 PROCEDURE — 93005 ELECTROCARDIOGRAM TRACING: CPT

## 2020-12-28 ASSESSMENT — PAIN DESCRIPTION - LOCATION: LOCATION: HIP

## 2020-12-28 ASSESSMENT — PAIN SCALES - GENERAL: PAINLEVEL_OUTOF10: 4

## 2020-12-29 ENCOUNTER — APPOINTMENT (OUTPATIENT)
Dept: GENERAL RADIOLOGY | Age: 85
DRG: 481 | End: 2020-12-29
Payer: MEDICARE

## 2020-12-29 ENCOUNTER — APPOINTMENT (OUTPATIENT)
Dept: CT IMAGING | Age: 85
DRG: 481 | End: 2020-12-29
Payer: MEDICARE

## 2020-12-29 ENCOUNTER — ANESTHESIA EVENT (OUTPATIENT)
Dept: OPERATING ROOM | Age: 85
DRG: 481 | End: 2020-12-29
Payer: MEDICARE

## 2020-12-29 PROBLEM — N39.0 UTI (URINARY TRACT INFECTION): Status: ACTIVE | Noted: 2020-12-29

## 2020-12-29 PROBLEM — R00.1 BRADYCARDIA: Status: ACTIVE | Noted: 2020-12-29

## 2020-12-29 PROBLEM — I50.43 CHF (CONGESTIVE HEART FAILURE), NYHA CLASS I, ACUTE ON CHRONIC, COMBINED (HCC): Status: RESOLVED | Noted: 2019-05-03 | Resolved: 2020-12-29

## 2020-12-29 PROBLEM — S82.891A: Status: RESOLVED | Noted: 2019-06-03 | Resolved: 2020-12-29

## 2020-12-29 PROBLEM — S82.61XD: Status: RESOLVED | Noted: 2019-06-04 | Resolved: 2020-12-29

## 2020-12-29 PROBLEM — S72.011A SUBCAPITAL FRACTURE OF NECK OF RIGHT FEMUR (HCC): Status: ACTIVE | Noted: 2020-12-29

## 2020-12-29 PROBLEM — S32.009A FRACTURE OF LUMBAR SPINE WITHOUT CORD INJURY, CLOSED, INITIAL ENCOUNTER (HCC): Status: RESOLVED | Noted: 2019-06-04 | Resolved: 2020-12-29

## 2020-12-29 PROBLEM — I25.10 ASHD (ARTERIOSCLEROTIC HEART DISEASE): Status: ACTIVE | Noted: 2020-12-29

## 2020-12-29 PROBLEM — S72.001A HIP FRACTURE, RIGHT, CLOSED, INITIAL ENCOUNTER (HCC): Status: ACTIVE | Noted: 2020-12-29

## 2020-12-29 LAB
A/G RATIO: 1.2 (ref 1.1–2.2)
ALBUMIN SERPL-MCNC: 3.8 G/DL (ref 3.4–5)
ALP BLD-CCNC: 73 U/L (ref 40–129)
ALT SERPL-CCNC: 15 U/L (ref 10–40)
ANION GAP SERPL CALCULATED.3IONS-SCNC: 8 MMOL/L (ref 3–16)
AST SERPL-CCNC: 23 U/L (ref 15–37)
BACTERIA: ABNORMAL /HPF
BASOPHILS ABSOLUTE: 0.1 K/UL (ref 0–0.2)
BASOPHILS RELATIVE PERCENT: 2.3 %
BILIRUB SERPL-MCNC: 0.5 MG/DL (ref 0–1)
BILIRUBIN URINE: NEGATIVE
BLOOD, URINE: NEGATIVE
BUN BLDV-MCNC: 14 MG/DL (ref 7–20)
CALCIUM SERPL-MCNC: 9.2 MG/DL (ref 8.3–10.6)
CHLORIDE BLD-SCNC: 100 MMOL/L (ref 99–110)
CLARITY: ABNORMAL
CO2: 32 MMOL/L (ref 21–32)
COLOR: YELLOW
CREAT SERPL-MCNC: 0.7 MG/DL (ref 0.6–1.2)
EOSINOPHILS ABSOLUTE: 0.1 K/UL (ref 0–0.6)
EOSINOPHILS RELATIVE PERCENT: 3.1 %
EPITHELIAL CELLS, UA: 0 /HPF (ref 0–5)
GFR AFRICAN AMERICAN: >60
GFR NON-AFRICAN AMERICAN: >60
GLOBULIN: 3.3 G/DL
GLUCOSE BLD-MCNC: 121 MG/DL (ref 70–99)
GLUCOSE URINE: NEGATIVE MG/DL
HCT VFR BLD CALC: 40.1 % (ref 36–48)
HEMOGLOBIN: 13 G/DL (ref 12–16)
HYALINE CASTS: 0 /LPF (ref 0–8)
INR BLD: 0.91 (ref 0.86–1.14)
KETONES, URINE: NEGATIVE MG/DL
LEUKOCYTE ESTERASE, URINE: NEGATIVE
LYMPHOCYTES ABSOLUTE: 0.8 K/UL (ref 1–5.1)
LYMPHOCYTES RELATIVE PERCENT: 17.2 %
MCH RBC QN AUTO: 29.3 PG (ref 26–34)
MCHC RBC AUTO-ENTMCNC: 32.3 G/DL (ref 31–36)
MCV RBC AUTO: 90.6 FL (ref 80–100)
MICROSCOPIC EXAMINATION: YES
MONOCYTES ABSOLUTE: 0.2 K/UL (ref 0–1.3)
MONOCYTES RELATIVE PERCENT: 3.7 %
NEUTROPHILS ABSOLUTE: 3.4 K/UL (ref 1.7–7.7)
NEUTROPHILS RELATIVE PERCENT: 73.7 %
NITRITE, URINE: POSITIVE
PDW BLD-RTO: 16.1 % (ref 12.4–15.4)
PH UA: 7.5 (ref 5–8)
PLATELET # BLD: 190 K/UL (ref 135–450)
PMV BLD AUTO: 7.8 FL (ref 5–10.5)
POTASSIUM REFLEX MAGNESIUM: 4.9 MMOL/L (ref 3.5–5.1)
PRO-BNP: 556 PG/ML (ref 0–449)
PROTEIN UA: NEGATIVE MG/DL
PROTHROMBIN TIME: 10.6 SEC (ref 10–13.2)
RBC # BLD: 4.42 M/UL (ref 4–5.2)
RBC UA: 1 /HPF (ref 0–4)
SARS-COV-2: NOT DETECTED
SODIUM BLD-SCNC: 140 MMOL/L (ref 136–145)
SPECIFIC GRAVITY UA: 1.02 (ref 1–1.03)
TOTAL CK: 71 U/L (ref 26–192)
TOTAL PROTEIN: 7.1 G/DL (ref 6.4–8.2)
TROPONIN: <0.01 NG/ML
URINE REFLEX TO CULTURE: ABNORMAL
URINE TYPE: ABNORMAL
UROBILINOGEN, URINE: 1 E.U./DL
WBC # BLD: 4.7 K/UL (ref 4–11)
WBC UA: 1 /HPF (ref 0–5)

## 2020-12-29 PROCEDURE — 83880 ASSAY OF NATRIURETIC PEPTIDE: CPT

## 2020-12-29 PROCEDURE — 85025 COMPLETE CBC W/AUTO DIFF WBC: CPT

## 2020-12-29 PROCEDURE — 1200000000 HC SEMI PRIVATE

## 2020-12-29 PROCEDURE — 72170 X-RAY EXAM OF PELVIS: CPT

## 2020-12-29 PROCEDURE — 2580000003 HC RX 258: Performed by: INTERNAL MEDICINE

## 2020-12-29 PROCEDURE — 71045 X-RAY EXAM CHEST 1 VIEW: CPT

## 2020-12-29 PROCEDURE — 99223 1ST HOSP IP/OBS HIGH 75: CPT | Performed by: NURSE PRACTITIONER

## 2020-12-29 PROCEDURE — 85610 PROTHROMBIN TIME: CPT

## 2020-12-29 PROCEDURE — 6360000002 HC RX W HCPCS: Performed by: INTERNAL MEDICINE

## 2020-12-29 PROCEDURE — 36415 COLL VENOUS BLD VENIPUNCTURE: CPT

## 2020-12-29 PROCEDURE — 84484 ASSAY OF TROPONIN QUANT: CPT

## 2020-12-29 PROCEDURE — U0003 INFECTIOUS AGENT DETECTION BY NUCLEIC ACID (DNA OR RNA); SEVERE ACUTE RESPIRATORY SYNDROME CORONAVIRUS 2 (SARS-COV-2) (CORONAVIRUS DISEASE [COVID-19]), AMPLIFIED PROBE TECHNIQUE, MAKING USE OF HIGH THROUGHPUT TECHNOLOGIES AS DESCRIBED BY CMS-2020-01-R: HCPCS

## 2020-12-29 PROCEDURE — 82550 ASSAY OF CK (CPK): CPT

## 2020-12-29 PROCEDURE — 80053 COMPREHEN METABOLIC PANEL: CPT

## 2020-12-29 PROCEDURE — 81001 URINALYSIS AUTO W/SCOPE: CPT

## 2020-12-29 PROCEDURE — 70450 CT HEAD/BRAIN W/O DYE: CPT

## 2020-12-29 PROCEDURE — 73552 X-RAY EXAM OF FEMUR 2/>: CPT

## 2020-12-29 PROCEDURE — 72125 CT NECK SPINE W/O DYE: CPT

## 2020-12-29 RX ORDER — TRAMADOL HYDROCHLORIDE 50 MG/1
50 TABLET ORAL NIGHTLY
Status: ON HOLD | COMMUNITY
End: 2021-01-01 | Stop reason: SDUPTHER

## 2020-12-29 RX ORDER — SODIUM CHLORIDE 9 MG/ML
INJECTION, SOLUTION INTRAVENOUS CONTINUOUS
Status: DISCONTINUED | OUTPATIENT
Start: 2020-12-29 | End: 2020-12-30

## 2020-12-29 RX ORDER — LANOLIN ALCOHOL/MO/W.PET/CERES
325 CREAM (GRAM) TOPICAL 2 TIMES DAILY
COMMUNITY

## 2020-12-29 RX ORDER — ONDANSETRON 2 MG/ML
4 INJECTION INTRAMUSCULAR; INTRAVENOUS EVERY 6 HOURS PRN
Status: DISCONTINUED | OUTPATIENT
Start: 2020-12-29 | End: 2020-12-30

## 2020-12-29 RX ORDER — PANTOPRAZOLE SODIUM 40 MG/1
40 TABLET, DELAYED RELEASE ORAL DAILY
COMMUNITY

## 2020-12-29 RX ORDER — TRAMADOL HYDROCHLORIDE 50 MG/1
50 TABLET ORAL EVERY 4 HOURS PRN
Status: ON HOLD | COMMUNITY
End: 2021-01-01 | Stop reason: HOSPADM

## 2020-12-29 RX ORDER — LORAZEPAM 0.5 MG/1
0.5 TABLET ORAL EVERY 8 HOURS PRN
Status: ON HOLD | COMMUNITY
End: 2021-01-01 | Stop reason: SDUPTHER

## 2020-12-29 RX ORDER — GUAIFENESIN 100 MG/5ML
300 SYRUP ORAL EVERY 4 HOURS PRN
COMMUNITY

## 2020-12-29 RX ORDER — SELENIUM 50 MCG
500 TABLET ORAL 2 TIMES DAILY
COMMUNITY

## 2020-12-29 RX ORDER — ACETAMINOPHEN 500 MG
1000 TABLET ORAL EVERY 8 HOURS PRN
COMMUNITY

## 2020-12-29 RX ORDER — IPRATROPIUM BROMIDE AND ALBUTEROL SULFATE 2.5; .5 MG/3ML; MG/3ML
1 SOLUTION RESPIRATORY (INHALATION) EVERY 4 HOURS PRN
COMMUNITY

## 2020-12-29 RX ORDER — HYDROMORPHONE HYDROCHLORIDE 1 MG/ML
0.5 INJECTION, SOLUTION INTRAMUSCULAR; INTRAVENOUS; SUBCUTANEOUS EVERY 4 HOURS PRN
Status: DISCONTINUED | OUTPATIENT
Start: 2020-12-29 | End: 2020-12-30

## 2020-12-29 RX ORDER — POLYETHYLENE GLYCOL 3350 17 G/17G
17 POWDER, FOR SOLUTION ORAL DAILY
COMMUNITY

## 2020-12-29 RX ADMIN — SODIUM CHLORIDE: 9 INJECTION, SOLUTION INTRAVENOUS at 07:06

## 2020-12-29 RX ADMIN — CEFAZOLIN SODIUM 1 G: 1 INJECTION, POWDER, FOR SOLUTION INTRAMUSCULAR; INTRAVENOUS at 20:46

## 2020-12-29 RX ADMIN — ENOXAPARIN SODIUM 40 MG: 40 INJECTION SUBCUTANEOUS at 20:46

## 2020-12-29 RX ADMIN — CEFAZOLIN SODIUM 1 G: 1 INJECTION, POWDER, FOR SOLUTION INTRAMUSCULAR; INTRAVENOUS at 11:30

## 2020-12-29 RX ADMIN — HYDROMORPHONE HYDROCHLORIDE 0.5 MG: 1 INJECTION, SOLUTION INTRAMUSCULAR; INTRAVENOUS; SUBCUTANEOUS at 12:14

## 2020-12-29 ASSESSMENT — PAIN - FUNCTIONAL ASSESSMENT
PAIN_FUNCTIONAL_ASSESSMENT: PREVENTS OR INTERFERES WITH ALL ACTIVE AND SOME PASSIVE ACTIVITIES
PAIN_FUNCTIONAL_ASSESSMENT: PREVENTS OR INTERFERES WITH ALL ACTIVE AND SOME PASSIVE ACTIVITIES

## 2020-12-29 ASSESSMENT — PAIN SCALES - GENERAL
PAINLEVEL_OUTOF10: 0
PAINLEVEL_OUTOF10: 4
PAINLEVEL_OUTOF10: 7

## 2020-12-29 ASSESSMENT — PAIN DESCRIPTION - PROGRESSION
CLINICAL_PROGRESSION: NOT CHANGED

## 2020-12-29 ASSESSMENT — PAIN DESCRIPTION - FREQUENCY
FREQUENCY: CONTINUOUS
FREQUENCY: CONTINUOUS

## 2020-12-29 ASSESSMENT — ENCOUNTER SYMPTOMS: SHORTNESS OF BREATH: 1

## 2020-12-29 ASSESSMENT — PAIN DESCRIPTION - PAIN TYPE: TYPE: ACUTE PAIN

## 2020-12-29 NOTE — PROGRESS NOTES
Vamsi notified that pts BP's have been elevated, PRN orders requested.  Electronically signed by Darren Yu RN on 12/29/2020 at 6:29 PM

## 2020-12-29 NOTE — ED NOTES
Attempted to place nassar x 1 and Shy RAMIREZ RN attempted. Unable to obtain. Placed purwick in pt and jennie care provide with clean brief. Pt swabbed for COVID r/o and sent to lab. Pt is on a continuous pulse oximetry and telemetry monitoring. Pt continued on cycling blood pressure. Fall risk precautions in place, call light in reach, bed side table within reach, bed alarm on, will continue to monitor.          Rehan [facility]  12/29/20 0737

## 2020-12-29 NOTE — ED NOTES
Pt hypoxia upon arrival place pt on 2L of per NC Dr. Tayla Macario aware.    Dr. Tayla Macario made aware of BP via private message in ED     [facility]  12/29/20 9289

## 2020-12-29 NOTE — ANESTHESIA PRE PROCEDURE
Cholecalciferol (VITAMIN D3) 2000 units CAPS Take 4,000 Units by mouth daily    Yes Historical Provider, MD   citalopram (CELEXA) 10 MG tablet TAKE ONE TABLET BY MOUTH DAILY 2/4/19  Yes Tello Aguiar MD   loratadine (CLARITIN) 10 MG tablet Take 10 mg by mouth daily    Historical Provider, MD   levothyroxine (SYNTHROID) 75 MCG tablet TAKE ONE TABLET BY MOUTH DAILY 3/19/18   Tello Aguiar MD       Current medications:    Current Facility-Administered Medications   Medication Dose Route Frequency Provider Last Rate Last Admin    0.9 % sodium chloride infusion   Intravenous Continuous Dino Martinez  mL/hr at 12/29/20 0706 New Bag at 12/29/20 0706    HYDROmorphone HCl PF (DILAUDID) injection 0.5 mg  0.5 mg Intravenous Q4H PRN Dino Martinez MD   0.5 mg at 12/29/20 1214    ondansetron (ZOFRAN) injection 4 mg  4 mg Intravenous Q6H PRN Dino Martinez MD        enoxaparin (LOVENOX) injection 40 mg  40 mg Subcutaneous Nightly Dino Martinez MD        ceFAZolin (ANCEF) 1 g in dextrose 5 % 50 mL IVPB (mini-bag)  1 g Intravenous Q8H Dino Martinez MD   Stopped at 12/29/20 1204     Current Outpatient Medications   Medication Sig Dispense Refill    Lactobacillus (ACIDOPHILUS) CAPS capsule Take 500 mg by mouth 2 times daily      ferrous sulfate (FE TABS 325) 325 (65 Fe) MG EC tablet Take 325 mg by mouth 2 times daily      pantoprazole (PROTONIX) 40 MG tablet Take 40 mg by mouth daily      polyethylene glycol (GLYCOLAX) 17 GM/SCOOP powder Take 17 g by mouth daily      Starch-Maltodextrin (THICK-IT PO) Take 10 oz by mouth Use as directed      traMADol (ULTRAM) 50 MG tablet Take 50 mg by mouth nightly.       acetaminophen (TYLENOL) 500 MG tablet Take 1,000 mg by mouth every 8 hours as needed for Pain      ipratropium-albuterol (DUONEB) 0.5-2.5 (3) MG/3ML SOLN nebulizer solution Inhale 1 vial into the lungs every 4 hours as needed for Shortness of Breath  LORazepam (ATIVAN) 0.5 MG tablet Take 0.5 mg by mouth every 8 hours as needed for Anxiety.  guaiFENesin (ROBITUSSIN) 100 MG/5ML syrup Take 300 mg by mouth every 4 hours as needed for Cough      traMADol (ULTRAM) 50 MG tablet Take 50 mg by mouth every 4 hours as needed for Pain.  sennosides-docusate sodium (SENOKOT-S) 8.6-50 MG tablet Take 2 tablets by mouth every 72 hours 60 tablet 1    Cholecalciferol (VITAMIN D3) 2000 units CAPS Take 4,000 Units by mouth daily       citalopram (CELEXA) 10 MG tablet TAKE ONE TABLET BY MOUTH DAILY 30 tablet 9    loratadine (CLARITIN) 10 MG tablet Take 10 mg by mouth daily      levothyroxine (SYNTHROID) 75 MCG tablet TAKE ONE TABLET BY MOUTH DAILY 90 tablet 2       Allergies:  No Known Allergies    Problem List:    Patient Active Problem List   Diagnosis Code    Other specified acquired hypothyroidism E03.8    A-fib ([facility] I48.91    HTN (hypertension) I10    CHF (congestive heart failure), NYHA class I, acute on chronic, combined (HCC) I50.43    Congestive heart failure (HCC) I50.9    Fracture, ankle, right, closed, initial encounter S82.891A    Fracture of lumbar spine without cord injury, closed, [facility] S32.009A    Closed fracture of distal lateral malleolus of ankle with routine healing, right S82.61XD    Hip fracture, right, closed, initial encounter ([facility] S72.001A       Past Medical History:        Diagnosis Date    Atrial fibrillation ([facility]     Cancer ([facility]     breast cancer     CHF (congestive heart failure) ([facility]     Hyperlipidemia     Hypertension     Seasonal allergies     Thyroid disease        Past Surgical History:        Procedure Laterality Date    EYE SURGERY      HIP SURGERY Left 7-26-14    LEFT HIP HEMIARTHROPLASTY                 HYSTERECTOMY         Social History:    Social History     Tobacco Use    Smoking status: Never Smoker    Smokeless tobacco: Never Used   Substance Use Topics    Alcohol use:  No Counseling given: Not Answered      Vital Signs (Current):   Vitals:    12/29/20 0345 12/29/20 0400 12/29/20 0800 12/29/20 1145   BP: (!) 147/61 (!) 143/55     Pulse: 77 77     Resp: 18 24     Temp:   36.7 °C (98 °F) 36.7 °C (98 °F)   TempSrc:   Oral Oral   SpO2:       Weight:       Height:                                                  BP Readings from Last 3 Encounters:   12/29/20 (!) 143/55   06/04/19 (!) 165/71   05/11/19 130/64       NPO Status:                                                                                 BMI:   Wt Readings from Last 3 Encounters:   12/28/20 104 lb (47.2 kg)   06/04/19 104 lb 9.6 oz (47.4 kg)   05/11/19 112 lb 4.8 oz (50.9 kg)     Body mass index is 19.02 kg/m².     CBC:   Lab Results   Component Value Date    WBC 4.7 12/29/2020    RBC 4.42 12/29/2020    HGB 13.0 12/29/2020    HCT 40.1 12/29/2020    MCV 90.6 12/29/2020    RDW 16.1 12/29/2020     12/29/2020       CMP:   Lab Results   Component Value Date     12/29/2020    K 4.9 12/29/2020     12/29/2020    CO2 32 12/29/2020    BUN 14 12/29/2020    CREATININE 0.7 12/29/2020    GFRAA >60 12/29/2020    GFRAA >60 05/09/2013    AGRATIO 1.2 12/29/2020    LABGLOM >60 12/29/2020    GLUCOSE 121 12/29/2020    PROT 7.1 12/29/2020    PROT 6.8 10/22/2012    CALCIUM 9.2 12/29/2020    BILITOT 0.5 12/29/2020    ALKPHOS 73 12/29/2020    AST 23 12/29/2020    ALT 15 12/29/2020       POC Tests:   Recent Labs     12/28/20  2351   POCGLU 115*       Coags:   Lab Results   Component Value Date    PROTIME 10.6 12/29/2020    INR 0.91 12/29/2020    APTT 34.6 06/03/2019       HCG (If Applicable): No results found for: PREGTESTUR, PREGSERUM, HCG, HCGQUANT     ABGs: No results found for: PHART, PO2ART, VTS3GCO, VDQ5VWK, BEART, Q2OXOULE     Type & Screen (If Applicable):  No results found for: LABABO, LABRH    Drug/Infectious Status (If Applicable):  No results found for: HIV, HEPCAB COVID-19 Screening (If Applicable): No results found for: COVID19      Anesthesia Evaluation  Patient summary reviewed and Nursing notes reviewed  Airway:         Dental:          Pulmonary:   (+) shortness of breath (hypoxia on arrival to ED, placed on 2L O2 via NC.): new,                             Cardiovascular:    (+) hypertension:, dysrhythmias (no anticoagulation): atrial fibrillation, CHF ():, hyperlipidemia      ECG reviewed                     ROS comment: Diagnosis Preliminary 12/28/2020 11:48 PM 14  Sinus bradycardia Left axis deviation Inferior infarct , age undetermined Anteroseptal infarct , age undetermined ST & T wave abnormality, consider lateral ischemiaAbnormal ECG         Neuro/Psych:                ROS comment: Dementia - poor historian   GI/Hepatic/Renal:             Endo/Other:    (+) hypothyroidism::., .                 Abdominal:           Vascular:                                      Anesthesia Plan      general     ASA 3     (Pre-op assessment completed through chart review only. Pt has dementia and is in droplet plus isolation for r/o Covid. Full pre-op assessment will be completed on DOS.  )            Plan discussed with attending.                   SALVADOR Diamond - CRNA   12/29/2020

## 2020-12-29 NOTE — CARE COORDINATION
Jean-Paul Riggins [phone] to confirm that pt is an [address] [place] [address] resident. Ortho planning for surgery tomorrow then will await PT/OT evals/recs. Will continue to follow for support and discharge planning.  Isaac Miller MSW, LSW

## 2020-12-29 NOTE — H&P
[place] [address]                     [facility], [address]                              HISTORY AND PHYSICAL    PATIENT NAME: Hawa Tomlin                     :        1925  MED REC NO:   [id number]                          ROOM:       0010  ACCOUNT NO:   [de-identified]                           ADMIT DATE: 2020  PROVIDER:     Sole Holland MD    HISTORY OF PRESENT ILLNESS:  The patient is a 51-year-old, very frail  elderly woman from nursing home, came to the emergency room following an  unwitnessed fall. The patient was complaining of right hip pain. The  patient was hypoxic upon arrival in the emergency room at 84% on room  air. The patient denied any chest pain. The patient is quite confused. She is very hard of hearing and has a very weak phonation, not able to  provide proper history. She is a DNR CC. She was found on the ground  between the door and the bed. It was unclear how long she may have been  down there. She is significantly demented and she is unable to provide  for her self-care or any accurate history. No dizziness. No loss of  consciousness. No convulsions. No incontinence. No ear, nose and  throat bleeding. Her oxygen requirement appeared to be at 1 or 2 liters  and she is usually not on oxygen at nursing home. She made eye contact  intermittently. There was no nausea, vomiting. PAST MEDICAL HISTORY:  Pertinent for atrial fibrillation, breast cancer,  congestive heart failure, hyperlipidemia, hypertension, seasonal  allergies, hypothyroidism and senile dementia. PAST SURGICAL HISTORY:  Pertinent for cataract removal, left-sided hip  hemiarthroplasty and hysterectomy. SOCIAL HISTORY:  She is a . She has one child, a son and few  grandchildren. She worked at [employer], when she was young. There is no history of smoking, no history of drinking.   No history of drug abuse. She is institutionalized for care home care. No history of  substance abuse. FAMILY HISTORY:  Both her parents are  because of natural  causes. REVIEW OF SYSTEMS:  Negative for loss of consciousness. No unusual  pain. No visual blurring. The patient is very hard of hearing. The  patient has significant psychomotor retardation. There is no dysphagia. Overall failure to thrive. She has a very poor nutrition with a BMI of  19. No angina pectoris. No orthopnea. No paroxysmal nocturnal  dyspnea. Does have exertional shortness of breath. No abdominal pain. No hematemesis. No melena. No genitourinary complaint except  incontinence. Neurologically, there is generalized neuromuscular  weakness, poor coordination. The patient does have right lower  extremity pain. There is tenderness in the right hip with restricted  range of motion and apparent limb length discrepancy. LABORATORY DATA:  Lab evaluation shows sodium 140, potassium 4.9,  chloride 100, CO2 32, BUN 14, creatinine 0.7, anion gap is 8. Blood  sugar is 121, calcium is 9.2, total CK is 71. ProBNP level 556. Troponin is less than 0.01. Albumin 3.8, globulin 3.3, alkaline  phosphatase 73, AST and ALT is 15 and 23. White blood cell count is  4.7, hemoglobin/hematocrit is 13 and 40.1, platelet count is 303. PT/INR is 10.6 and 0.91. COVID-19 test has been ordered but not  resulted yet. Urinalysis is consistent with low-grade urinary tract  infection with 4+ bacteria and positive nitrites. Chest x-ray, no acute intrathoracic disease. X-ray of the right femur  shows subcapital fracture of the right femoral neck. X-ray of the  pelvis shows no other acute abnormality except right-sided subcapital  fracture. EKG was done, the patient had inferior infarct of  undetermined age, sinus bradycardia, left axis deviation, ST-T  abnormality, consider lateral ischemia. ASSESSMENT:  Subcapital fracture of the right femur, senile dementia  with urinary tract infection, atherosclerotic heart disease,  hypertension, hypothyroidism, chronic combined heart failure, chronic  atrial fibrillation, sinus bradycardia and hypoxemia. PLAN:  Get her admitted. Treat her with supplemental oxygen, parenteral  analgesics and antiemetic. The patient as such can be cleared for  surgery. She is DNR comfort care. The risk involved is more than  routine but it is probably the best option for her to give her any  chance of bouncing back out of this and putting her back to her  ambulatory status. So she does remain at a high risk, but I am still  clearing the patient for surgery.         Franco Katz MD    D: 12/29/2020 13:48:19       T: 12/29/2020 13:53:56     [id number]  Job#: [id number]     Doc#: [id number]    CC:

## 2020-12-29 NOTE — PROGRESS NOTES
Patient Active Problem List   Diagnosis    A[facility]fib ([facility]    HTN (hypertension)    Hypoxemia    [facility]    Hip fracture, right, closed, initial encounter ([facility]    Subcapital fracture of neck of right femur ([facility]    UTI (urinary tract infection)    Bradycardia    ASHD (arteriosclerotic heart disease)   H&P dictated

## 2020-12-29 NOTE — ED NOTES
Pt came in from [facility] for unwitnessed fall, [address] does not know last well known. No FSBS on EMS. Pt is alert to self only. Report given to Dr. Matthew Jones. Pt complains of right hip and upper leg pain upon arrival. Pt placed on continuous pulse oximetry and telemetry monitoring. Pt placed on cycling blood pressure. Fall risk precautions in place, call light in reach, bed side table within reach, bed alarm on, will continue to monitor.        Latisha Justin RN  12/29/20 0028

## 2020-12-29 NOTE — CONSULTS
[facility]  Consult Note    Patient: Catarino Olvera Date: 2020  Requesting Physician: No admitting provider for patient encounter. Room: ED-0010/10    Chief complaint: RIGHT hip pain    HPI: Vidya Sherman is a 80 y.o. female who presented to [facility] ER overnight for an unwitnessed fall when she was found down on the floor between her bed and the door. She is in assisted living at [facility]. She has dementia; does not c/o of pain. She is oriented to her name. Per her son, Gay Juares, she has been non-ambulatory for about a year, following a bout of pneumonia. PMH includes: Dementia, atrial fibrillation on no A/C, CHF, HLD, HTN, thyroid disease. She has history of left hip hemiarthroplasty in . Describes no pain. I reviewed previous records includin [facility] admission for right ankle fracture. At that time she was on Xarelto for atrial fibrillation. I independently reviewed RIGHT hip x-ray which demonstrated: Subcapital right femoral neck fracture    Patient lives at [facility] assisted living and is non-ambulatory. Medical History:  Past Medical History:   Diagnosis Date    Atrial fibrillation ([address]     Cancer [facility]     breast cancer     CHF (congestive heart failure) (HCC)     Hyperlipidemia     Hypertension     Seasonal allergies     Thyroid disease      Past Surgical History:   Procedure Laterality Date    EYE SURGERY      HIP SURGERY Left 14    LEFT HIP HEMIARTHROPLASTY                 HYSTERECTOMY         Social History:    reports that she has never smoked. She has never used smokeless tobacco.    Family History:  No family history on file.     Medications:  ALL MEDICATIONS HAVE BEEN REVIEWED:  Scheduled:   enoxaparin  40 mg Subcutaneous Nightly     Continuous:   sodium chloride 100 mL/hr at 20 0706     PRN:HYDROmorphone, ondansetron    Allergies: No Known Allergies    Review of Systems:   Unable to obtain as patient with demential. No family present. Objective:  Vitals:    12/29/20 0400   BP: (!) 143/55   Pulse: 77   Resp: 24   Temp:    SpO2:       Physical Examination:  GENERAL: No apparent distress, thin  SKIN:  Warm and dry  EYES: Nonicteric. ENT: Mucous membranes moist  HEAD: Normocephalic, atraumatic  RESPIRATORY: Resp easy and unlabored  CARDIOVASCULAR: Regular rate and rhythm  GI: Abdomen soft, nontender  NEURO: Awake and alert. Oriented to name only  PSYCHIATRIC: Appropriate affect; not agitated  MUSCULOSKELETAL:  RIGHT LE  Inspection: Skin overlying hip intact without lesions, erythema, ecchymosis or swelling. No obvious deformity. Palpation: non-tender  ROM:  Unable to assess as patient does not follow commands  Sensation: appears intact as patient murmurs \"cold\" when touched (Liley response to my hand temperature)  Vascular:  Intact DP pulse    Labs reviewed:  Recent Labs     12/29/20  0034   WBC 4.7   HGB 13.0   HCT 40.1        Recent Labs     12/28/20  2354 12/29/20  0034   NA  --  140   K  --  4.9   CL  --  100   CO2  --  32   BUN  --  14   CREATININE  --  0.7   GLUCOSE 115 121*   CALCIUM  --  9.2     Recent Labs     12/29/20  0034   INR 0.91   PROTIME 10.6       Lab Results   Component Value Date    COLORU YELLOW 12/29/2020    CLARITYU TURBID (A) 12/29/2020    PHUR 7.5 12/29/2020    GLUCOSEU Negative 12/29/2020    BLOODU Negative 12/29/2020    LEUKOCYTESUR Negative 12/29/2020    BILIRUBINUR Negative 12/29/2020    UROBILINOGEN 1.0 12/29/2020    RBCUA 1 12/29/2020    WBCUA 1 12/29/2020    BACTERIA 4+ (A) 12/29/2020       Imaging:  XR PELVIS (1-2 VIEWS)   Final Result   Subcapital right femoral neck fracture         XR FEMUR RIGHT (MIN 2 VIEWS)   Final Result   Subcapital right femoral neck fracture         XR CHEST PORTABLE   Final Result   Stable chest x-ray. No acute disease. CT CERVICAL SPINE WO CONTRAST   Final Result   No acute abnormality of the cervical spine. CT HEAD WO CONTRAST   Final Result   No acute intracranial abnormality. Diffuse atrophic changes with findings suggesting chronic microvascular   ischemia           IMPRESSION:  RIGHT subcapital hip fracture  Atrial fibrillation  Dementia  HTN  Non-ambulatory    Active Problems:    Hip fracture, right, closed, initial encounter [facility])  Resolved Problems:    * No resolved hospital problems. *    RECOMMENDATIONS:  Discussed with son, Odessa Shah, ([phone])  treatment options and risks / outcomes of operative and non-operative treatment courses for his mother's injury. He would like to proceed with surgical repair and gave permission via telephone. Schedule for RIGHT hip percutaneous pinning tomorrow (12/30/2020) with Dr. Eddie Barahona. NPO after MN  NWB RIGHT LE  Pain control  DVT prophylaxis  Consult to hospitalist for pre-op clearance  Verify surgical consent  The following conditions increase patient's risk of complications:  Dementia, atrial fibrillation, non-ambulatory, CHF, HTN. Patient does not use tobacco products. I have reviewed imaging and plan with Dr. Eddie Barahona.         SALVADOR NAVARRETE-CNP  12/29/2020  8:54 AM

## 2020-12-29 NOTE — ED NOTES
Report given to [place], RN pt moved to room [facility], [address] [place] [address]  12/29/20 4949

## 2020-12-29 NOTE — ED PROVIDER NOTES
[facility] Emergency Department    CHIEF COMPLAINT  Chief Complaint   Patient presents with    Fall     pt borught in by [facility] for a unwitness fall, pt complains of right hip pain. pt hypoxic upon arrival 84 % RA. HISTORY OF PRESENT ILLNESS  Dami Alejandra is a 80 y.o. female  who presents to the ED complaining of unwitnessed fall, being found on the ground between the door and bed. Unclear how long she may have been down, she is notably demented without ability to provide any meaningful history herself. She does not even tell me where she hurts or what her name is or what is going on. She is DNR-CC. She is hypoxic without known baseline oxygen requirement on arrival ant comfortable on NC oxygen. No known recent acute illnesses. She has a mild cough on my exam.  She seems tender in the R femur when palpated but doesn't respond to questions even though she makes eye contact when asking her questions. In previous documentation as well as me seeing her last summer, I recall that she has essentially severe dementia and unable to provide much helpful history herself. No other complaints, modifying factors or associated symptoms. I have reviewed the following from the nursing documentation. Past Medical History:   Diagnosis Date    Atrial fibrillation ([address]     Cancer [facility])     breast cancer     CHF (congestive heart failure) (HCC)     Hyperlipidemia     Hypertension     Seasonal allergies     Thyroid disease      Past Surgical History:   Procedure Laterality Date    EYE SURGERY      HIP SURGERY Left 7-26-14    LEFT HIP HEMIARTHROPLASTY                 HYSTERECTOMY       No family history on file. Social History     Socioeconomic History    Marital status:       Spouse name: Not on file    Number of children: Not on file    Years of education: Not on file    Highest education level: Not on file   Occupational History    Not on file   Social Needs  Financial resource strain: Not on file   Chantel-Zana insecurity     Worry: Not on file     Inability: Not on file   [employer] needs     Medical: Not on file     Non-medical: Not on file   Tobacco Use    Smoking status: Never Smoker    Smokeless tobacco: Never Used   Substance and Sexual Activity    Alcohol use: No    Drug use: No    Sexual activity: Not Currently   Lifestyle    Physical activity     Days per week: Not on file     Minutes per session: Not on file    Stress: Not on file   Relationships    Social connections     Talks on phone: Not on file     Gets together: Not on file     Attends [employer]ism service: Not on file     Active member of club or organization: Not on file     Attends meetings of clubs or organizations: Not on file     Relationship status: Not on file    Intimate partner violence     Fear of current or ex partner: Not on file     Emotionally abused: Not on file     Physically abused: Not on file     Forced sexual activity: Not on file   Other Topics Concern    Not on file   Social History Narrative    Not on file     No current facility-administered medications for this encounter. Current Outpatient Medications   Medication Sig Dispense Refill    Lactobacillus (ACIDOPHILUS) CAPS capsule Take 500 mg by mouth 2 times daily      ferrous sulfate (FE TABS 325) 325 (65 Fe) MG EC tablet Take 325 mg by mouth 2 times daily      pantoprazole (PROTONIX) 40 MG tablet Take 40 mg by mouth daily      polyethylene glycol (GLYCOLAX) 17 GM/SCOOP powder Take 17 g by mouth daily      Starch-Maltodextrin (THICK-IT PO) Take 10 oz by mouth Use as directed      traMADol (ULTRAM) 50 MG tablet Take 50 mg by mouth nightly.       acetaminophen (TYLENOL) 500 MG tablet Take 1,000 mg by mouth every 8 hours as needed for Pain      ipratropium-albuterol (DUONEB) 0.5-2.5 (3) MG/3ML SOLN nebulizer solution Inhale 1 vial into the lungs every 4 hours as needed for Shortness of Breath      LORazepam (ATIVAN) 0.5 MG tablet Take 0.5 mg by mouth every 8 hours as needed for Anxiety.  guaiFENesin (ROBITUSSIN) 100 MG/5ML syrup Take 300 mg by mouth every 4 hours as needed for Cough      traMADol (ULTRAM) 50 MG tablet Take 50 mg by mouth every 4 hours as needed for Pain.  sennosides-docusate sodium (SENOKOT-S) 8.6-50 MG tablet Take 2 tablets by mouth every 72 hours 60 tablet 1    Cholecalciferol (VITAMIN D3) 2000 units CAPS Take 4,000 Units by mouth daily       citalopram (CELEXA) 10 MG tablet TAKE ONE TABLET BY MOUTH DAILY 30 tablet 9    loratadine (CLARITIN) 10 MG tablet Take 10 mg by mouth daily      levothyroxine (SYNTHROID) 75 MCG tablet TAKE ONE TABLET BY MOUTH DAILY 90 tablet 2     No Known Allergies    REVIEW OF SYSTEMS  10 systems reviewed, pertinent positives per HPI otherwise noted to be negative. PHYSICAL EXAM  BP (!) 171/75   Pulse 80   Temp 98.1 °F (36.7 °C) (Oral)   Resp 29   Ht 5' 2\" (1.575 m)   Wt 104 lb (47.2 kg)   SpO2 90%   BMI 19.02 kg/m²    GENERAL APPEARANCE: Awake and alert. Cooperative. No distress. HENT: Normocephalic. Old bruise to R forehead noted, nontender, no other craniofacial trauma evident. Mucous membranes are dry. BACK:      Cervical: no tenderness noted, no midline tenderness, no stepoffs deformities or bruising      Thoracic: no tenderness noted, no midline tenderness, no stepoffs deformities or bruising      Lumbar: no tenderness noted, no midline tenderness, no stepoffs deformities or bruising  NECK: Supple. EYES: PERRL. EOM's grossly intact. HEART/CHEST: RRR. No murmurs. No chest wall tenderness. LUNGS: Respirations unlabored. CTAB. Good air exchange. Speaking comfortably in full sentences. ABDOMEN: No tenderness. Soft. Non-distended. No masses. No organomegaly. No guarding or rebound. Normal bowel sounds throughout. Pelvis stable. MUSCULOSKELETAL:  RUE: No tenderness. 2+ radial pulse. Brisk cap refill x5 digits.   Sensation and motor function fully intact in the radial, ulnar, and median nerve distribution. Full range of motion of all major joints. Cardinal movements of hand fully intact. No erythema, bruising, or lacerations. Comparments are soft. LUE:  No tenderness. 2+ radial pulse. Brisk cap refill x5 digits. Sensation and motor function fully intact in the radial, ulnar, and median nerve distribution. Full range of motion of all major joints. Cardinal movements of hand fully intact. No erythema, bruising, or lacerations. Comparments are soft. RLE: Tender at the femur mid-thigh with flexion of hip/knee, no ttp of the hip joint itself, no ttp of the shin calf ankle or foot. 2+ DP and PT. Sensation and motor function fully intact. Full range of motion of all major joints. No erythema, bruising, or lacerations. Compartments are soft. 2+ patellar reflex. Achilles nontender and intact. No joint swelling or effusions are noted. LLE: No tenderness. 2+ DP and PT. Sensation and motor function fully intact. Full range of motion of all major joints. No erythema, bruising, or lacerations. Compartments are soft. 2+ patellar reflex. Achilles nontender and intact. No joint swelling or effusions are noted. SKIN: Warm and dry. No acute rashes. NEUROLOGICAL: Alert and essentially nonverbal, follows some commands inconsistently, this is known to be at her baseline. CN's 2-12 intact. No gross facial drooping. Strength 5/5, sensation intact. 2 plus DTR's in knees bilaterally. Gait not assessed. LABS  I have reviewed all labs for this visit.    Results for orders placed or performed during the hospital encounter of 12/28/20   CBC auto differential   Result Value Ref Range    WBC 4.7 4.0 - 11.0 K/uL    RBC 4.42 4.00 - 5.20 M/uL    Hemoglobin 13.0 12.0 - 16.0 g/dL    Hematocrit 40.1 36.0 - 48.0 %    MCV 90.6 80.0 - 100.0 fL    MCH 29.3 26.0 - 34.0 pg    MCHC 32.3 31.0 - 36.0 g/dL    RDW 16.1 (H) 12.4 - 15.4 %    Platelets 592 861 - 450 K/uL    MPV 7.8 5.0 - 10.5 fL    Neutrophils % 73.7 %    Lymphocytes % 17.2 %    Monocytes % 3.7 %    Eosinophils % 3.1 %    Basophils % 2.3 %    Neutrophils Absolute 3.4 1.7 - 7.7 K/uL    Lymphocytes Absolute 0.8 (L) 1.0 - 5.1 K/uL    Monocytes Absolute 0.2 0.0 - 1.3 K/uL    Eosinophils Absolute 0.1 0.0 - 0.6 K/uL    Basophils Absolute 0.1 0.0 - 0.2 K/uL   Comprehensive Metabolic Panel w/ Reflex to MG   Result Value Ref Range    Sodium 140 136 - 145 mmol/L    Potassium reflex Magnesium 4.9 3.5 - 5.1 mmol/L    Chloride 100 99 - 110 mmol/L    CO2 32 21 - 32 mmol/L    Anion Gap 8 3 - 16    Glucose 121 (H) 70 - 99 mg/dL    BUN 14 7 - 20 mg/dL    CREATININE 0.7 0.6 - 1.2 mg/dL    GFR Non-African American >60 >60    GFR African American >60 >60    Calcium 9.2 8.3 - 10.6 mg/dL    Total Protein 7.1 6.4 - 8.2 g/dL    Alb 3.8 3.4 - 5.0 g/dL    Albumin/Globulin Ratio 1.2 1.1 - 2.2    Total Bilirubin 0.5 0.0 - 1.0 mg/dL    Alkaline Phosphatase 73 40 - 129 U/L    ALT 15 10 - 40 U/L    AST 23 15 - 37 U/L    Globulin 3.3 g/dL   CK   Result Value Ref Range    Total CK 71 26 - 192 U/L   Troponin   Result Value Ref Range    Troponin <0.01 <0.01 ng/mL   Brain Natriuretic Peptide   Result Value Ref Range    Pro- (H) 0 - 449 pg/mL   Protime-INR   Result Value Ref Range    Protime 10.6 10.0 - 13.2 sec    INR 0.91 0.86 - 1.14   POCT glucose   Result Value Ref Range    Glucose 115 mg/dL    QC OK?  yes    POCT Glucose   Result Value Ref Range    POC Glucose 115 (H) 70 - 99 mg/dl    Performed on ACCU-CouplewiseK    EKG 12 Lead   Result Value Ref Range    Ventricular Rate 55 BPM    Atrial Rate 55 BPM    P-R Interval 196 ms    QRS Duration 118 ms    Q-T Interval 490 ms    QTc Calculation (Bazett) 468 ms    R Axis -54 degrees    T Axis 116 degrees    Diagnosis       Sinus bradycardiaLeft axis deviationInferior infarct , age undeterminedAnteroseptal infarct , age undeterminedST & T wave abnormality, consider lateral ischemiaAbnormal ECG The 12 lead EKG was interpreted by me as follows:  Rate: bradycardia with a rate of 55  Rhythm: sinus  Axis: left deviation  Intervals: left bundle branch block which limits further interpretation of EKG changes  Prior EKG comparison: EKG dated 6/3/19 is not significantly different    RADIOLOGY    Xr Pelvis (1-2 Views)    Result Date: 12/29/2020  EXAMINATION: ONE XRAY VIEW OF THE PELVIS 12/29/2020 12:33 am COMPARISON: 06/03/2019 HISTORY: ORDERING SYSTEM PROVIDED HISTORY: unwitnessed fall TECHNOLOGIST PROVIDED HISTORY: Reason for exam:->unwitnessed fall Reason for Exam: pain to posterior pelvis Acuity: Acute Type of Exam: Initial Mechanism of Injury: patient fell FINDINGS: Anatomic alignment. Previous left hip arthroplasty. Subcapital right femoral neck fracture. Vascular calcifications. Subcapital right femoral neck fracture     Xr Femur Right (min 2 Views)    Result Date: 12/29/2020  EXAMINATION: 4 XRAY VIEWS OF THE RIGHT FEMUR 12/29/2020 12:33 am COMPARISON: None. HISTORY: ORDERING SYSTEM PROVIDED HISTORY: pain fall TECHNOLOGIST PROVIDED HISTORY: Reason for exam:->pain fall Reason for Exam: R/femur pain Acuity: Acute Type of Exam: Initial Mechanism of Injury: patient fell FINDINGS: Subcapital right femoral neck fracture. Anatomic alignment. Vascular calcifications. No other fractures. Subcapital right femoral neck fracture     Ct Head Wo Contrast    Result Date: 12/29/2020  EXAMINATION: CT OF THE HEAD WITHOUT CONTRAST  12/29/2020 12:13 am TECHNIQUE: CT of the head was performed without the administration of intravenous contrast. Dose modulation, iterative reconstruction, and/or weight based adjustment of the mA/kV was utilized to reduce the radiation dose to as low as reasonably achievable. COMPARISON: 06/03/2019 HISTORY: ORDERING SYSTEM PROVIDED HISTORY: unwitnessed fall TECHNOLOGIST PROVIDED HISTORY: Reason for exam:->unwitnessed fall Has a \"code stroke\" or \"stroke alert\" been called? ->No Reason for Exam: Fall (pt borught in by [facility] for a unwitness fall, pt complains of right hip pain. pt hypoxic upon arrival 84 % RA. ) FINDINGS: BRAIN/VENTRICLES: - The ventricles and sulci are diffusely enlarged. Low attenuation is seen in the periventricular and subcortical white matter. No acute intracranial hemorrhage or acute infarct is identified. ORBITS: The visualized portion of the orbits demonstrate no acute abnormality. SINUSES: The visualized paranasal sinuses and mastoid air cells demonstrate no acute abnormality. SOFT TISSUES/SKULL:  No acute abnormality of the visualized skull or soft tissues. No acute intracranial abnormality. Diffuse atrophic changes with findings suggesting chronic microvascular ischemia     Ct Cervical Spine Wo Contrast    Result Date: 12/29/2020  EXAMINATION: CT OF THE CERVICAL SPINE WITHOUT CONTRAST 12/29/2020 12:13 am TECHNIQUE: CT of the cervical spine was performed without the administration of intravenous contrast. Multiplanar reformatted images are provided for review. Dose modulation, iterative reconstruction, and/or weight based adjustment of the mA/kV was utilized to reduce the radiation dose to as low as reasonably achievable. COMPARISON: 06/03/2019 HISTORY: ORDERING SYSTEM PROVIDED HISTORY: trauma TECHNOLOGIST PROVIDED HISTORY: Reason for exam:->trauma Reason for Exam: Fall (pt borught in by [facility] for a unwitness fall, pt complains of right hip pain. pt hypoxic upon arrival 84 % RA. ) FINDINGS: BONES/ALIGNMENT: There is no acute fracture or traumatic malalignment. Unchanged mild irregularity superior endplate C7. DEGENERATIVE CHANGES: Multilevel degenerative changes. SOFT TISSUES: There is no prevertebral soft tissue swelling. No acute abnormality of the cervical spine.      Xr Chest Portable    Result Date: 12/29/2020  EXAMINATION: ONE XRAY VIEW OF THE CHEST 12/29/2020 12:33 am COMPARISON: 05/07/2019 and 06/03/2019 HISTORY: [address] PROVIDED HISTORY: hypoxia TECHNOLOGIST PROVIDED HISTORY: Reason for exam:->hypoxia Reason for Exam: hypoxia Acuity: Acute Type of Exam: Initial Relevant Medical/Surgical History: previous A-FIB,cancer,CHF and hypertension FINDINGS: The heart is enlarged and unchanged. There is a vertically oriented band of chronic atelectasis in the left retrocardiac region. Lungs are hyperinflated with diffuse chronic prominence of interstitial lung markings. No pneumothorax or pleural effusion. Stable right paratracheal soft tissue deviating the trachea to the left. Stable chest x-ray. No acute disease. ED COURSE/MDM  Patient seen and evaluated. Old records reviewed. Labs and imaging reviewed and results discussed with patient. The patient's ED workup was notable for unwitnessed fall. History is severely limited as the patient is unable to provide any history herself. She is found to have a right sided subcapital femoral neck fracture and does not appear to have any other tenderness in the other extremities anywhere else and does not have any evidence of neurovascular compromise. She is inexplicably hypoxic but does not seem to be in any respiratory distress whatsoever and these numbers seem to improve on low amount of nasal cannula oxygen. Her chest x-ray is without infiltrate and she is afebrile. Covid swab is currently pending. Patient has an unremarkable CT of the head and cervical spine. No other injuries are noted. No evidence of an infectious process. Urinalysis currently pending. Discussed CTPA as possibility with admitting physician Dr. Rehan Jasmine, decided to hold off for now given nothing to suggest infection. HR is normal, she is nondistressed with the saturations when they are borderline, so do feel that a conservative approach in a DNR-CC patient is reasonable. CLINICAL IMPRESSION  1. Closed subcapital fracture of right femur, initial encounter ([facility] [address]    2. Unwitnessed fall    3.  Dementia without behavioral disturbance, unspecified dementia type ([facility] [address]    4. Hypoxia        Blood pressure (!) 171/75, pulse 80, temperature 98.1 °F (36.7 °C), temperature source Oral, resp. rate 29, height 5' 2\" (1.575 m), weight 104 lb (47.2 kg), SpO2 90 %. Benedict Mccallum was admitted in fair condition. The plan is to admit to the hospital at this time under the PCP's admitting service. Dr. Melissa Fontaine accepted the patient and will take over the patient's care. DISCLAIMER: This chart was created using Dragon dictation software. Efforts were made by me to ensure accuracy, however some errors may be present due to limitations of this technology and occasionally words are not transcribed correctly.         Darian Barrientos MD  12/29/20 0838

## 2020-12-29 NOTE — PROGRESS NOTES
Department of Internal Medicine  General Internal Medicine   Progress Note    81 yo white lady brought in from NH after accidental unwitnessed fall  SUBJECTIVE: right hip pain , denies chest pain or SOB     History obtained from chart review, the patient and nursing staff  General ROS: positive for  - fatigue, malaise, sleep disturbance and weight loss  negative for - chills, fever or night sweats  Psychological ROS: positive for - anxiety, disorientation, irritability and memory difficulties  negative for - behavioral disorder, hallucinations or hostility  Ophthalmic ROS: negative  Respiratory ROS: no cough, shortness of breath, or wheezing  Cardiovascular ROS: positive for - dyspnea on exertion  negative for - chest pain  Gastrointestinal ROS: no abdominal pain, change in bowel habits, or black or bloody stools  Genito-Urinary ROS: incontinent   Musculoskeletal ROS: right hip pain unable to ambulate   Neurological ROS: no TIA or stroke symptoms  Dermatological ROS: negative    OBJECTIVE      Medications      Current Facility-Administered Medications: 0.9 % sodium chloride infusion, , Intravenous, Continuous  HYDROmorphone HCl PF (DILAUDID) injection 0.5 mg, 0.5 mg, Intravenous, Q4H PRN  ondansetron (ZOFRAN) injection 4 mg, 4 mg, Intravenous, Q6H PRN  enoxaparin (LOVENOX) injection 40 mg, 40 mg, Subcutaneous, Nightly  ceFAZolin (ANCEF) 1 g in dextrose 5 % 50 mL IVPB (mini-bag), 1 g, Intravenous, Q8H    Physical      Vitals: BP (!) 143/55   Pulse 77   Temp 98 °F (36.7 °C) (Oral)   Resp 24   Ht 5' 2\" (1.575 m)   Wt 104 lb (47.2 kg)   SpO2 (!) 83%   BMI 19.02 kg/m²   Temp: Temp: 98 °F (36.7 °C)  Max: Temp  Av °F (36.7 °C)  Min: 98 °F (36.7 °C)  Max: 98.1 °F (36.7 °C)  Respiration range:  Resp  Av.4  Min: 18  Max: 29  Pulse Range:  Pulse  Av.4  Min: 59  Max: 80  Blood pressure range:  Systolic (98SGC), FQB:957 , Min:143 , XWI:138   , Diastolic (54SHG), UFB:55, Min:55, Max:87    SpO2  Av.7 % Min: 83 %  Max: 100 %  No intake or output data in the 24 hours ending 20 1350    Vent settings:  Pulse  Av.4  Min: 61  Max: 80  Resp  Av.4  Min: 18  Max: 29  SpO2  Av.7 %  Min: 83 %  Max: 100 %    CONSTITUTIONAL:  fatigued, somnolent, uncooperative, distracted, mild distress, appears stated age and thin  EYES:  Unremarkable   NECK:  No JVD  and supple, symmetrical, trachea midline  BACK:  Kyphotic  and symmetric  LUNGS:  Poor inspiration , vesicular  No wheezes   CARDIOVASCULAR:  RRR S1 S2, normal apical pulses, regular rate and rhythm and normal S1 and S2  ABDOMEN:  Soft scaphoid BS + non tender   MUSCULOSKELETAL:  Tenderness right hip restricted ROM  NEUROLOGIC:  General weakness poor co-ordination   SKIN:  Warm dry and pale  and no bruising or bleeding    Data      Recent Results (from the past 96 hour(s))   EKG 12 Lead    Collection Time: 20 11:48 PM   Result Value Ref Range    Ventricular Rate 55 BPM    Atrial Rate 55 BPM    P-R Interval 196 ms    QRS Duration 118 ms    Q-T Interval 490 ms    QTc Calculation (Bazett) 468 ms    R Axis -54 degrees    T Axis 116 degrees    Diagnosis       Sinus bradycardiaLeft axis deviationInferior infarct , age undeterminedAnteroseptal infarct , age undeterminedST & T wave abnormality, consider lateral ischemiaAbnormal ECG   POCT Glucose    Collection Time: 20 11:51 PM   Result Value Ref Range    POC Glucose 115 (H) 70 - 99 mg/dl    Performed on ACCU-CHEK    POCT glucose    Collection Time: 20 11:54 PM   Result Value Ref Range    Glucose 115 mg/dL    QC OK?  yes    CBC auto differential    Collection Time: 20 12:34 AM   Result Value Ref Range    WBC 4.7 4.0 - 11.0 K/uL    RBC 4.42 4.00 - 5.20 M/uL    Hemoglobin 13.0 12.0 - 16.0 g/dL    Hematocrit 40.1 36.0 - 48.0 %    MCV 90.6 80.0 - 100.0 fL    MCH 29.3 26.0 - 34.0 pg    MCHC 32.3 31.0 - 36.0 g/dL    RDW 16.1 (H) 12.4 - 15.4 %    Platelets 413 970 - 841 K/uL    MPV 7.8 5.0 - 10.5 fL Neutrophils % 73.7 %    Lymphocytes % 17.2 %    Monocytes % 3.7 %    Eosinophils % 3.1 %    Basophils % 2.3 %    Neutrophils Absolute 3.4 1.7 - 7.7 K/uL    Lymphocytes Absolute 0.8 (L) 1.0 - 5.1 K/uL    Monocytes Absolute 0.2 0.0 - 1.3 K/uL    Eosinophils Absolute 0.1 0.0 - 0.6 K/uL    Basophils Absolute 0.1 0.0 - 0.2 K/uL   Comprehensive Metabolic Panel w/ Reflex to MG    Collection Time: 12/29/20 12:34 AM   Result Value Ref Range    Sodium 140 136 - 145 mmol/L    Potassium reflex Magnesium 4.9 3.5 - 5.1 mmol/L    Chloride 100 99 - 110 mmol/L    CO2 32 21 - 32 mmol/L    Anion Gap 8 3 - 16    Glucose 121 (H) 70 - 99 mg/dL    BUN 14 7 - 20 mg/dL    CREATININE 0.7 0.6 - 1.2 mg/dL    GFR Non-African American >60 >60    GFR African American >60 >60    Calcium 9.2 8.3 - 10.6 mg/dL    Total Protein 7.1 6.4 - 8.2 g/dL    Alb 3.8 3.4 - 5.0 g/dL    Albumin/Globulin Ratio 1.2 1.1 - 2.2    Total Bilirubin 0.5 0.0 - 1.0 mg/dL    Alkaline Phosphatase 73 40 - 129 U/L    ALT 15 10 - 40 U/L    AST 23 15 - 37 U/L    Globulin 3.3 g/dL   CK    Collection Time: 12/29/20 12:34 AM   Result Value Ref Range    Total CK 71 26 - 192 U/L   Troponin    Collection Time: 12/29/20 12:34 AM   Result Value Ref Range    Troponin <0.01 <0.01 ng/mL   Brain Natriuretic Peptide    Collection Time: 12/29/20 12:34 AM   Result Value Ref Range    Pro- (H) 0 - 449 pg/mL   Protime-INR    Collection Time: 12/29/20 12:34 AM   Result Value Ref Range    Protime 10.6 10.0 - 13.2 sec    INR 0.91 0.86 - 1.14   Urinalysis Reflex to Culture    Collection Time: 12/29/20  7:07 AM    Specimen: Urine, clean catch   Result Value Ref Range    Color, UA YELLOW Straw/Yellow    Clarity, UA TURBID (A) Clear    Glucose, Ur Negative Negative mg/dL    Bilirubin Urine Negative Negative    Ketones, Urine Negative Negative mg/dL    Specific Gravity, UA 1.017 1.005 - 1.030    Blood, Urine Negative Negative    pH, UA 7.5 5.0 - 8.0    Protein, UA Negative Negative mg/dL

## 2020-12-29 NOTE — ED NOTES
Bed: 10  Expected date:   Expected time:   Means of arrival:   Comments:  Hold for [address] [place] [address]  12/29/20 [id number]

## 2020-12-29 NOTE — ED NOTES
Updated pt son on poc, pt son would like to speak with othro before decision is made about surgery.        Arnaldo Damon RN  12/29/20 [id number]

## 2020-12-30 ENCOUNTER — ANESTHESIA (OUTPATIENT)
Dept: OPERATING ROOM | Age: 85
DRG: 481 | End: 2020-12-30
Payer: MEDICARE

## 2020-12-30 ENCOUNTER — APPOINTMENT (OUTPATIENT)
Dept: GENERAL RADIOLOGY | Age: 85
DRG: 481 | End: 2020-12-30
Payer: MEDICARE

## 2020-12-30 VITALS
OXYGEN SATURATION: 100 % | TEMPERATURE: 98.6 F | SYSTOLIC BLOOD PRESSURE: 145 MMHG | RESPIRATION RATE: 26 BRPM | DIASTOLIC BLOOD PRESSURE: 73 MMHG

## 2020-12-30 PROCEDURE — 6360000002 HC RX W HCPCS: Performed by: ORTHOPAEDIC SURGERY

## 2020-12-30 PROCEDURE — 2580000003 HC RX 258: Performed by: ORTHOPAEDIC SURGERY

## 2020-12-30 PROCEDURE — 27235 TREAT THIGH FRACTURE: CPT | Performed by: ORTHOPAEDIC SURGERY

## 2020-12-30 PROCEDURE — 1200000000 HC SEMI PRIVATE

## 2020-12-30 PROCEDURE — 3700000001 HC ADD 15 MINUTES (ANESTHESIA): Performed by: ORTHOPAEDIC SURGERY

## 2020-12-30 PROCEDURE — 2700000000 HC OXYGEN THERAPY PER DAY

## 2020-12-30 PROCEDURE — 3600000014 HC SURGERY LEVEL 4 ADDTL 15MIN: Performed by: ORTHOPAEDIC SURGERY

## 2020-12-30 PROCEDURE — 73502 X-RAY EXAM HIP UNI 2-3 VIEWS: CPT

## 2020-12-30 PROCEDURE — C1769 GUIDE WIRE: HCPCS | Performed by: ORTHOPAEDIC SURGERY

## 2020-12-30 PROCEDURE — 2709999900 HC NON-CHARGEABLE SUPPLY: Performed by: ORTHOPAEDIC SURGERY

## 2020-12-30 PROCEDURE — 0QH634Z INSERTION OF INTERNAL FIXATION DEVICE INTO RIGHT UPPER FEMUR, PERCUTANEOUS APPROACH: ICD-10-PCS | Performed by: ORTHOPAEDIC SURGERY

## 2020-12-30 PROCEDURE — 2500000003 HC RX 250 WO HCPCS: Performed by: NURSE ANESTHETIST, CERTIFIED REGISTERED

## 2020-12-30 PROCEDURE — 94760 N-INVAS EAR/PLS OXIMETRY 1: CPT

## 2020-12-30 PROCEDURE — 2720000010 HC SURG SUPPLY STERILE: Performed by: ORTHOPAEDIC SURGERY

## 2020-12-30 PROCEDURE — 6360000002 HC RX W HCPCS: Performed by: NURSE ANESTHETIST, CERTIFIED REGISTERED

## 2020-12-30 PROCEDURE — 2500000003 HC RX 250 WO HCPCS: Performed by: ORTHOPAEDIC SURGERY

## 2020-12-30 PROCEDURE — 99024 POSTOP FOLLOW-UP VISIT: CPT | Performed by: NURSE PRACTITIONER

## 2020-12-30 PROCEDURE — 7100000001 HC PACU RECOVERY - ADDTL 15 MIN: Performed by: ORTHOPAEDIC SURGERY

## 2020-12-30 PROCEDURE — C1713 ANCHOR/SCREW BN/BN,TIS/BN: HCPCS | Performed by: ORTHOPAEDIC SURGERY

## 2020-12-30 PROCEDURE — 3700000000 HC ANESTHESIA ATTENDED CARE: Performed by: ORTHOPAEDIC SURGERY

## 2020-12-30 PROCEDURE — 2580000003 HC RX 258: Performed by: INTERNAL MEDICINE

## 2020-12-30 PROCEDURE — APPNB30 APP NON BILLABLE TIME 0-30 MINS: Performed by: NURSE PRACTITIONER

## 2020-12-30 PROCEDURE — 6360000002 HC RX W HCPCS: Performed by: INTERNAL MEDICINE

## 2020-12-30 PROCEDURE — 3600000004 HC SURGERY LEVEL 4 BASE: Performed by: ORTHOPAEDIC SURGERY

## 2020-12-30 PROCEDURE — 7100000000 HC PACU RECOVERY - FIRST 15 MIN: Performed by: ORTHOPAEDIC SURGERY

## 2020-12-30 PROCEDURE — 6370000000 HC RX 637 (ALT 250 FOR IP): Performed by: ORTHOPAEDIC SURGERY

## 2020-12-30 PROCEDURE — 2580000003 HC RX 258: Performed by: ANESTHESIOLOGY

## 2020-12-30 DEVICE — CANNULATED SCREW
Type: IMPLANTABLE DEVICE | Site: HIP | Status: FUNCTIONAL
Brand: ASNIS

## 2020-12-30 RX ORDER — SENNA AND DOCUSATE SODIUM 50; 8.6 MG/1; MG/1
1 TABLET, FILM COATED ORAL 2 TIMES DAILY
Status: DISCONTINUED | OUTPATIENT
Start: 2020-12-30 | End: 2021-01-04 | Stop reason: HOSPADM

## 2020-12-30 RX ORDER — ONDANSETRON 2 MG/ML
INJECTION INTRAMUSCULAR; INTRAVENOUS PRN
Status: DISCONTINUED | OUTPATIENT
Start: 2020-12-30 | End: 2020-12-30 | Stop reason: SDUPTHER

## 2020-12-30 RX ORDER — LIDOCAINE HYDROCHLORIDE 10 MG/ML
1 INJECTION, SOLUTION EPIDURAL; INFILTRATION; INTRACAUDAL; PERINEURAL
Status: ACTIVE | OUTPATIENT
Start: 2020-12-30 | End: 2020-12-30

## 2020-12-30 RX ORDER — TRAMADOL HYDROCHLORIDE 50 MG/1
50 TABLET ORAL EVERY 6 HOURS PRN
Status: DISCONTINUED | OUTPATIENT
Start: 2020-12-30 | End: 2021-01-04 | Stop reason: HOSPADM

## 2020-12-30 RX ORDER — HYDRALAZINE HYDROCHLORIDE 20 MG/ML
5 INJECTION INTRAMUSCULAR; INTRAVENOUS EVERY 10 MIN PRN
Status: DISCONTINUED | OUTPATIENT
Start: 2020-12-30 | End: 2020-12-30 | Stop reason: HOSPADM

## 2020-12-30 RX ORDER — SODIUM CHLORIDE 0.9 % (FLUSH) 0.9 %
10 SYRINGE (ML) INJECTION EVERY 12 HOURS SCHEDULED
Status: DISCONTINUED | OUTPATIENT
Start: 2020-12-30 | End: 2021-01-04 | Stop reason: HOSPADM

## 2020-12-30 RX ORDER — SODIUM CHLORIDE, SODIUM LACTATE, POTASSIUM CHLORIDE, CALCIUM CHLORIDE 600; 310; 30; 20 MG/100ML; MG/100ML; MG/100ML; MG/100ML
INJECTION, SOLUTION INTRAVENOUS CONTINUOUS
Status: DISCONTINUED | OUTPATIENT
Start: 2020-12-30 | End: 2021-01-01

## 2020-12-30 RX ORDER — SODIUM CHLORIDE 9 MG/ML
INJECTION, SOLUTION INTRAVENOUS CONTINUOUS
Status: DISCONTINUED | OUTPATIENT
Start: 2020-12-30 | End: 2021-01-01

## 2020-12-30 RX ORDER — LABETALOL HYDROCHLORIDE 5 MG/ML
5 INJECTION, SOLUTION INTRAVENOUS EVERY 10 MIN PRN
Status: DISCONTINUED | OUTPATIENT
Start: 2020-12-30 | End: 2020-12-30 | Stop reason: HOSPADM

## 2020-12-30 RX ORDER — DEXAMETHASONE SODIUM PHOSPHATE 4 MG/ML
INJECTION, SOLUTION INTRA-ARTICULAR; INTRALESIONAL; INTRAMUSCULAR; INTRAVENOUS; SOFT TISSUE PRN
Status: DISCONTINUED | OUTPATIENT
Start: 2020-12-30 | End: 2020-12-30 | Stop reason: SDUPTHER

## 2020-12-30 RX ORDER — HYDROMORPHONE HCL 110MG/55ML
0.25 PATIENT CONTROLLED ANALGESIA SYRINGE INTRAVENOUS EVERY 5 MIN PRN
Status: DISCONTINUED | OUTPATIENT
Start: 2020-12-30 | End: 2020-12-30 | Stop reason: HOSPADM

## 2020-12-30 RX ORDER — SUCCINYLCHOLINE CHLORIDE 20 MG/ML
INJECTION INTRAMUSCULAR; INTRAVENOUS PRN
Status: DISCONTINUED | OUTPATIENT
Start: 2020-12-30 | End: 2020-12-30 | Stop reason: SDUPTHER

## 2020-12-30 RX ORDER — PROMETHAZINE HYDROCHLORIDE 25 MG/1
12.5 TABLET ORAL EVERY 6 HOURS PRN
Status: DISCONTINUED | OUTPATIENT
Start: 2020-12-30 | End: 2021-01-04 | Stop reason: HOSPADM

## 2020-12-30 RX ORDER — MORPHINE SULFATE 2 MG/ML
2 INJECTION, SOLUTION INTRAMUSCULAR; INTRAVENOUS
Status: DISCONTINUED | OUTPATIENT
Start: 2020-12-30 | End: 2021-01-04 | Stop reason: HOSPADM

## 2020-12-30 RX ORDER — EPHEDRINE SULFATE/0.9% NACL/PF 50 MG/5 ML
SYRINGE (ML) INTRAVENOUS PRN
Status: DISCONTINUED | OUTPATIENT
Start: 2020-12-30 | End: 2020-12-30 | Stop reason: SDUPTHER

## 2020-12-30 RX ORDER — FENTANYL CITRATE 50 UG/ML
INJECTION, SOLUTION INTRAMUSCULAR; INTRAVENOUS PRN
Status: DISCONTINUED | OUTPATIENT
Start: 2020-12-30 | End: 2020-12-30 | Stop reason: SDUPTHER

## 2020-12-30 RX ORDER — SODIUM CHLORIDE 0.9 % (FLUSH) 0.9 %
10 SYRINGE (ML) INJECTION PRN
Status: DISCONTINUED | OUTPATIENT
Start: 2020-12-30 | End: 2021-01-04 | Stop reason: HOSPADM

## 2020-12-30 RX ORDER — ONDANSETRON 2 MG/ML
4 INJECTION INTRAMUSCULAR; INTRAVENOUS
Status: DISCONTINUED | OUTPATIENT
Start: 2020-12-30 | End: 2020-12-30 | Stop reason: HOSPADM

## 2020-12-30 RX ORDER — TRAMADOL HYDROCHLORIDE 50 MG/1
100 TABLET ORAL EVERY 6 HOURS PRN
Status: DISCONTINUED | OUTPATIENT
Start: 2020-12-30 | End: 2021-01-04 | Stop reason: HOSPADM

## 2020-12-30 RX ORDER — GLYCOPYRROLATE 0.2 MG/ML
INJECTION INTRAMUSCULAR; INTRAVENOUS PRN
Status: DISCONTINUED | OUTPATIENT
Start: 2020-12-30 | End: 2020-12-30 | Stop reason: SDUPTHER

## 2020-12-30 RX ORDER — BUPIVACAINE HYDROCHLORIDE 5 MG/ML
INJECTION, SOLUTION EPIDURAL; INTRACAUDAL
Status: COMPLETED | OUTPATIENT
Start: 2020-12-30 | End: 2020-12-30

## 2020-12-30 RX ORDER — ONDANSETRON 2 MG/ML
4 INJECTION INTRAMUSCULAR; INTRAVENOUS EVERY 6 HOURS PRN
Status: DISCONTINUED | OUTPATIENT
Start: 2020-12-30 | End: 2021-01-04 | Stop reason: HOSPADM

## 2020-12-30 RX ORDER — LIDOCAINE HYDROCHLORIDE 20 MG/ML
INJECTION, SOLUTION INFILTRATION; PERINEURAL PRN
Status: DISCONTINUED | OUTPATIENT
Start: 2020-12-30 | End: 2020-12-30 | Stop reason: SDUPTHER

## 2020-12-30 RX ORDER — FENTANYL CITRATE 50 UG/ML
25 INJECTION, SOLUTION INTRAMUSCULAR; INTRAVENOUS EVERY 5 MIN PRN
Status: DISCONTINUED | OUTPATIENT
Start: 2020-12-30 | End: 2020-12-30 | Stop reason: HOSPADM

## 2020-12-30 RX ORDER — PROPOFOL 10 MG/ML
INJECTION, EMULSION INTRAVENOUS PRN
Status: DISCONTINUED | OUTPATIENT
Start: 2020-12-30 | End: 2020-12-30 | Stop reason: SDUPTHER

## 2020-12-30 RX ORDER — SODIUM CHLORIDE 9 MG/ML
INJECTION, SOLUTION INTRAVENOUS CONTINUOUS
Status: DISCONTINUED | OUTPATIENT
Start: 2020-12-30 | End: 2020-12-30

## 2020-12-30 RX ORDER — ACETAMINOPHEN 325 MG/1
650 TABLET ORAL EVERY 4 HOURS PRN
Status: DISCONTINUED | OUTPATIENT
Start: 2020-12-30 | End: 2021-01-04 | Stop reason: HOSPADM

## 2020-12-30 RX ORDER — MAGNESIUM HYDROXIDE 1200 MG/15ML
LIQUID ORAL CONTINUOUS PRN
Status: COMPLETED | OUTPATIENT
Start: 2020-12-30 | End: 2020-12-30

## 2020-12-30 RX ORDER — PROCHLORPERAZINE EDISYLATE 5 MG/ML
5 INJECTION INTRAMUSCULAR; INTRAVENOUS
Status: DISCONTINUED | OUTPATIENT
Start: 2020-12-30 | End: 2020-12-30 | Stop reason: HOSPADM

## 2020-12-30 RX ORDER — MORPHINE SULFATE 4 MG/ML
4 INJECTION, SOLUTION INTRAMUSCULAR; INTRAVENOUS
Status: DISCONTINUED | OUTPATIENT
Start: 2020-12-30 | End: 2021-01-04 | Stop reason: HOSPADM

## 2020-12-30 RX ADMIN — LIDOCAINE HYDROCHLORIDE 40 MG: 20 INJECTION, SOLUTION INFILTRATION; PERINEURAL at 10:01

## 2020-12-30 RX ADMIN — SUCCINYLCHOLINE CHLORIDE 60 MG: 20 INJECTION, SOLUTION INTRAMUSCULAR; INTRAVENOUS at 10:03

## 2020-12-30 RX ADMIN — Medication 5 MG: at 10:05

## 2020-12-30 RX ADMIN — Medication 10 MG: at 10:18

## 2020-12-30 RX ADMIN — PHENYLEPHRINE HYDROCHLORIDE 100 MCG: 10 INJECTION INTRAVENOUS at 10:16

## 2020-12-30 RX ADMIN — PROPOFOL 30 MG: 10 INJECTION, EMULSION INTRAVENOUS at 10:02

## 2020-12-30 RX ADMIN — Medication 10 MG: at 10:29

## 2020-12-30 RX ADMIN — Medication 5 MG: at 10:00

## 2020-12-30 RX ADMIN — CEFAZOLIN SODIUM 2 G: 10 INJECTION, POWDER, FOR SOLUTION INTRAVENOUS at 18:05

## 2020-12-30 RX ADMIN — SODIUM CHLORIDE: 9 INJECTION, SOLUTION INTRAVENOUS at 09:40

## 2020-12-30 RX ADMIN — CEFAZOLIN SODIUM 1 G: 1 INJECTION, POWDER, FOR SOLUTION INTRAMUSCULAR; INTRAVENOUS at 03:10

## 2020-12-30 RX ADMIN — GLYCOPYRROLATE 0.2 MG: 0.2 INJECTION INTRAMUSCULAR; INTRAVENOUS at 09:57

## 2020-12-30 RX ADMIN — Medication 5 MG: at 10:25

## 2020-12-30 RX ADMIN — SODIUM CHLORIDE: 9 INJECTION, SOLUTION INTRAVENOUS at 09:20

## 2020-12-30 RX ADMIN — SODIUM CHLORIDE, POTASSIUM CHLORIDE, SODIUM LACTATE AND CALCIUM CHLORIDE: 600; 310; 30; 20 INJECTION, SOLUTION INTRAVENOUS at 12:12

## 2020-12-30 RX ADMIN — Medication 10 ML: at 21:51

## 2020-12-30 RX ADMIN — Medication 5 MG: at 10:14

## 2020-12-30 RX ADMIN — DEXAMETHASONE SODIUM PHOSPHATE 4 MG: 4 INJECTION, SOLUTION INTRAMUSCULAR; INTRAVENOUS at 10:15

## 2020-12-30 RX ADMIN — PHENYLEPHRINE HYDROCHLORIDE 100 MCG: 10 INJECTION INTRAVENOUS at 10:12

## 2020-12-30 RX ADMIN — ENOXAPARIN SODIUM 40 MG: 40 INJECTION SUBCUTANEOUS at 21:50

## 2020-12-30 RX ADMIN — CEFAZOLIN 1 G: 1 INJECTION, POWDER, FOR SOLUTION INTRAVENOUS at 09:54

## 2020-12-30 RX ADMIN — Medication 5 MG: at 10:21

## 2020-12-30 RX ADMIN — ONDANSETRON 4 MG: 2 INJECTION INTRAMUSCULAR; INTRAVENOUS at 10:18

## 2020-12-30 RX ADMIN — FENTANYL CITRATE 25 MCG: 50 INJECTION, SOLUTION INTRAMUSCULAR; INTRAVENOUS at 10:31

## 2020-12-30 RX ADMIN — PHENYLEPHRINE HYDROCHLORIDE 100 MCG: 10 INJECTION INTRAVENOUS at 10:06

## 2020-12-30 RX ADMIN — PHENYLEPHRINE HYDROCHLORIDE 200 MCG: 10 INJECTION INTRAVENOUS at 10:22

## 2020-12-30 RX ADMIN — STANDARDIZED SENNA CONCENTRATE AND DOCUSATE SODIUM 1 TABLET: 8.6; 5 TABLET ORAL at 21:50

## 2020-12-30 RX ADMIN — FENTANYL CITRATE 25 MCG: 50 INJECTION, SOLUTION INTRAMUSCULAR; INTRAVENOUS at 10:01

## 2020-12-30 RX ADMIN — SODIUM CHLORIDE: 9 INJECTION, SOLUTION INTRAVENOUS at 04:46

## 2020-12-30 RX ADMIN — Medication 5 MG: at 10:09

## 2020-12-30 RX ADMIN — PHENYLEPHRINE HYDROCHLORIDE 200 MCG: 10 INJECTION INTRAVENOUS at 10:27

## 2020-12-30 RX ADMIN — SODIUM CHLORIDE: 9 INJECTION, SOLUTION INTRAVENOUS at 14:00

## 2020-12-30 ASSESSMENT — PULMONARY FUNCTION TESTS
PIF_VALUE: 21
PIF_VALUE: 2
PIF_VALUE: 4
PIF_VALUE: 18
PIF_VALUE: 0
PIF_VALUE: 20
PIF_VALUE: 17
PIF_VALUE: 4
PIF_VALUE: 4
PIF_VALUE: 25
PIF_VALUE: 12
PIF_VALUE: 23
PIF_VALUE: 4
PIF_VALUE: 20
PIF_VALUE: 20
PIF_VALUE: 10
PIF_VALUE: 21
PIF_VALUE: 5
PIF_VALUE: 9
PIF_VALUE: 18
PIF_VALUE: 2
PIF_VALUE: 2
PIF_VALUE: 21
PIF_VALUE: 1
PIF_VALUE: 20
PIF_VALUE: 2
PIF_VALUE: 9
PIF_VALUE: 4
PIF_VALUE: 21
PIF_VALUE: 20
PIF_VALUE: 21
PIF_VALUE: 19
PIF_VALUE: 3
PIF_VALUE: 18
PIF_VALUE: 20
PIF_VALUE: 20
PIF_VALUE: 1
PIF_VALUE: 19
PIF_VALUE: 1
PIF_VALUE: 2
PIF_VALUE: 21
PIF_VALUE: 12
PIF_VALUE: 20
PIF_VALUE: 3
PIF_VALUE: 21
PIF_VALUE: 21
PIF_VALUE: 3
PIF_VALUE: 12
PIF_VALUE: 3
PIF_VALUE: 21
PIF_VALUE: 2

## 2020-12-30 ASSESSMENT — PAIN SCALES - GENERAL
PAINLEVEL_OUTOF10: 3
PAINLEVEL_OUTOF10: 0

## 2020-12-30 ASSESSMENT — ENCOUNTER SYMPTOMS: SHORTNESS OF BREATH: 1

## 2020-12-30 ASSESSMENT — PAIN DESCRIPTION - LOCATION: LOCATION: HIP

## 2020-12-30 ASSESSMENT — PAIN DESCRIPTION - ORIENTATION: ORIENTATION: RIGHT

## 2020-12-30 NOTE — PROGRESS NOTES
Shift assessment completed. Medications given per MAR. Patient following commands and resting in bed without distress. Fall precaution in place. The care plan and education has been reviewed.

## 2020-12-30 NOTE — ANESTHESIA PRE PROCEDURE
Department of Anesthesiology  Preprocedure Note       Name:  Ida Hyatt   Age:  80 y.o.  :  1925                                          MRN:  [id number]         Date:  2020      Surgeon: Noam Aguero):  Lorraine Warren MD    Procedure: Procedure(s):  RIGHT HIP PINNING WITH C ARM    Medications prior to admission:   Prior to Admission medications    Medication Sig Start Date End Date Taking? Authorizing Provider   Lactobacillus (ACIDOPHILUS) CAPS capsule Take 500 mg by mouth 2 times daily    Historical Provider, MD   ferrous sulfate (FE TABS 325) 325 (65 Fe) MG EC tablet Take 325 mg by mouth 2 times daily    Historical Provider, MD   pantoprazole (PROTONIX) 40 MG tablet Take 40 mg by mouth daily    Historical Provider, MD   polyethylene glycol (GLYCOLAX) 17 GM/SCOOP powder Take 17 g by mouth daily    Historical Provider, MD   Starch-Maltodextrin (THICK-IT PO) Take 10 oz by mouth Use as directed    Historical Provider, MD   traMADol (ULTRAM) 50 MG tablet Take 50 mg by mouth nightly. Historical Provider, MD   acetaminophen (TYLENOL) 500 MG tablet Take 1,000 mg by mouth every 8 hours as needed for Pain    Historical Provider, MD   ipratropium-albuterol (DUONEB) 0.5-2.5 (3) MG/3ML SOLN nebulizer solution Inhale 1 vial into the lungs every 4 hours as needed for Shortness of Breath    Historical Provider, MD   LORazepam (ATIVAN) 0.5 MG tablet Take 0.5 mg by mouth every 8 hours as needed for Anxiety. Historical Provider, MD   guaiFENesin (ROBITUSSIN) 100 MG/5ML syrup Take 300 mg by mouth every 4 hours as needed for Cough    Historical Provider, MD   traMADol (ULTRAM) 50 MG tablet Take 50 mg by mouth every 4 hours as needed for Pain.     Historical Provider, MD   loratadine (CLARITIN) 10 MG tablet Take 10 mg by mouth daily    Historical Provider, MD   sennosides-docusate sodium (SENOKOT-S) 8.6-50 MG tablet Take 2 tablets by mouth every 72 hours 19   Usman Cerrato MD Cholecalciferol (VITAMIN D3) 2000 units CAPS Take 4,000 Units by mouth daily     Historical Provider, MD   citalopram (CELEXA) 10 MG tablet TAKE ONE TABLET BY MOUTH DAILY 2/4/19   Lance Friedman MD   levothyroxine (SYNTHROID) 75 MCG tablet TAKE ONE TABLET BY MOUTH DAILY 3/19/18   Lance Friedman MD       Current medications:    No current facility-administered medications for this visit. No current outpatient medications on file.      Facility-Administered Medications Ordered in Other Visits   Medication Dose Route Frequency Provider Last Rate Last Admin    0.9 % sodium chloride infusion   Intravenous Continuous oRmy Redmond MD        0.9 % sodium chloride infusion   Intravenous Continuous Wendy Solares  mL/hr at 12/30/20 0920 New Bag at 12/30/20 0920    HYDROmorphone HCl PF (DILAUDID) injection 0.5 mg  0.5 mg Intravenous Q4H PRN Wendy Solares MD   0.5 mg at 12/29/20 1214    ondansetron (ZOFRAN) injection 4 mg  4 mg Intravenous Q6H PRN Wendy Solares MD        enoxaparin (LOVENOX) injection 40 mg  40 mg Subcutaneous Nightly Wendy Solares MD   40 mg at 12/29/20 2046       Allergies:  No Known Allergies    Problem List:    Patient Active Problem List   Diagnosis Code    A-fib ([facility] I48.91    HTN (hypertension) I10    Hypoxemia R09.02    Congestive heart failure (HCC) I50.9    Hip fracture, right, closed, initial encounter ([facility] S72.001A    Subcapital fracture of neck of right femur ([facility] S72.011A    UTI (urinary tract infection) N39.0    Bradycardia R00.1    ASHD (arteriosclerotic heart disease) I25.10       Past Medical History:        Diagnosis Date    ASHD (arteriosclerotic heart disease) 12/29/2020    Atrial fibrillation ([facility]     Cancer ([facility]     breast cancer     CHF (congestive heart failure) ([facility]     Hyperlipidemia     Hypertension     Seasonal allergies     Thyroid disease        Past Surgical History:        Procedure Laterality Date    EYE SURGERY  HIP SURGERY Left 7-26-14    LEFT HIP HEMIARTHROPLASTY                 HYSTERECTOMY         Social History:    Social History     Tobacco Use    Smoking status: Never Smoker    Smokeless tobacco: Never Used   Substance Use Topics    Alcohol use: No                                Counseling given: Not Answered      Vital Signs (Current): There were no vitals filed for this visit.                                            BP Readings from Last 3 Encounters:   12/30/20 (!) 142/62   06/04/19 (!) 165/71   05/11/19 130/64       NPO Status:                                                                                 BMI:   Wt Readings from Last 3 Encounters:   12/28/20 104 lb (47.2 kg)   06/04/19 104 lb 9.6 oz (47.4 kg)   05/11/19 112 lb 4.8 oz (50.9 kg)     There is no height or weight on file to calculate BMI.    CBC:   Lab Results   Component Value Date    WBC 4.7 12/29/2020    RBC 4.42 12/29/2020    HGB 13.0 12/29/2020    HCT 40.1 12/29/2020    MCV 90.6 12/29/2020    RDW 16.1 12/29/2020     12/29/2020       CMP:   Lab Results   Component Value Date     12/29/2020    K 4.9 12/29/2020     12/29/2020    CO2 32 12/29/2020    BUN 14 12/29/2020    CREATININE 0.7 12/29/2020    GFRAA >60 12/29/2020    GFRAA >60 05/09/2013    AGRATIO 1.2 12/29/2020    LABGLOM >60 12/29/2020    GLUCOSE 121 12/29/2020    PROT 7.1 12/29/2020    PROT 6.8 10/22/2012    CALCIUM 9.2 12/29/2020    BILITOT 0.5 12/29/2020    ALKPHOS 73 12/29/2020    AST 23 12/29/2020    ALT 15 12/29/2020       POC Tests:   Recent Labs     12/28/20  2351   POCGLU 115*       Coags:   Lab Results   Component Value Date    PROTIME 10.6 12/29/2020    INR 0.91 12/29/2020    APTT 34.6 06/03/2019       HCG (If Applicable): No results found for: PREGTESTUR, PREGSERUM, HCG, HCGQUANT     ABGs: No results found for: PHART, PO2ART, FTU3XAP, XWF4EOP, BEART, K4SHOXCB     Type & Screen (If Applicable):  No results found for: LABABO, LABRH Drug/Infectious Status (If Applicable):  No results found for: HIV, HEPCAB    COVID-19 Screening (If Applicable):   Lab Results   Component Value Date    COVID19 Not Detected 12/29/2020         Anesthesia Evaluation  Patient summary reviewed and Nursing notes reviewed  Airway: Mallampati: I  TM distance: >3 FB   Neck ROM: full  Mouth opening: > = 3 FB Dental: normal exam         Pulmonary:   (+) shortness of breath (hypoxia on arrival to ED, placed on 2L O2 via NC.): new,                             Cardiovascular:    (+) hypertension:, CAD:, dysrhythmias (no anticoagulation): atrial fibrillation, CHF ():, hyperlipidemia      ECG reviewed                     ROS comment: Diagnosis Preliminary 12/28/2020 11:48 PM 14  Sinus bradycardia Left axis deviation Inferior infarct , age undetermined Anteroseptal infarct , age undetermined ST & T wave abnormality, consider lateral ischemiaAbnormal ECG    Conclusions      Summary   -Normal left ventricle size and systolic function with an estimated ejection   fraction of 55%. There is concentric left ventricular hypertrophy. Indeterminate diastolic function.   -Mitral annular calcification is present. Calcification of the mitral valve   noted. Mild to moderate mitral regurgitation.   -The left atrium is mildly dilated. -The aortic valve appears sclerotic but opens well. Mild aortic   regurgitation.   -Moderate tricuspid regurgitation with PASP of 45 mmHg. -The right atrial size is mildly dilated. -Trivial pulmonic regurgitation present.   -There is a trivial pericardial effusion noted. Neuro/Psych:                ROS comment: Dementia - poor historian   GI/Hepatic/Renal:             Endo/Other:    (+) hypothyroidism::., malignancy/cancer.                  Abdominal:           Vascular:                                          Anesthesia Plan      general     ASA 3 (Pre-op assessment completed through chart review. Pt has dementia. Per son, no issues with anesthesia and no recent cardiopulmonary changes)  Induction: intravenous. MIPS: Postoperative opioids intended and Prophylactic antiemetics administered. Anesthetic plan and risks discussed with child/children. Use of blood products discussed with child/children whom. Plan discussed with CRNA.                   Gosia Cronin MD   12/30/2020

## 2020-12-30 NOTE — PROGRESS NOTES
[facility] Orthopedic Surgery   Progress Note    CHIEF COMPLAINT/DIAGNOSIS:  12/30/20 RIGHT hip percutaneous pinning    SUBJECTIVE: Patient sleeping; son in room and states he spoke with her earlier and she knew who he was and told him she was glad he was here. He will be returning to [place] later tonight. OBJECTIVE  Physical    VITALS:  BP (!) 152/62   Pulse 82   Temp 98.2 °F (36.8 °C) (Oral)   Resp 18   Ht 5' 2\" (1.575 m)   Wt 104 lb (47.2 kg)   SpO2 97%   BMI 19.02 kg/m²     GENERAL: Sleeping, NAD  MUSCULOSKELETAL: RIGHT LE  INCISION:  Dressing c/d/i; ice pack in place  ROM: unable to assess  Sensory:  Unable to assess  Vascular:   Intact DP pulse;  calf soft    Data    ALL MEDICATIONS HAVE BEEN REVIEWED    CBC:   Recent Labs     12/29/20  0034   WBC 4.7   HGB 13.0   HCT 40.1        BMP:   Recent Labs     12/29/20  0034      K 4.9      CO2 32   BUN 14   CREATININE 0.7     INR:   Recent Labs     12/29/20  0034   INR 0.91       ASSESSMENT:  12/30/20 RIGHT hip percutaneous pinning, POD#0  Dementia  Atrial fibrillation (no A/C)  CHF  HLD  HTN  Thyroid disease    PLAN:   - WB status:  25% WB: reviewed post op precautions  - DVT prophylaxis: Lovenox  - PT/OT  - D/C Plan: await PT recs; likely return to [facility]  - Pain Control: current regimen. Due to orthopaedic surgical procedure/condition, patient may require pain medication for up to 6-8 weeks. - F STEFANY with Dr. Dwight Reddy in 2 weeks; call [phone] for appt.        SALVADOR NAVARRETE-CNP  12/30/2020  1:21 PM    .

## 2020-12-30 NOTE — ANESTHESIA POSTPROCEDURE EVALUATION
Department of Anesthesiology  Postprocedure Note    Patient: Michele Joseph  MRN: [id number]  YOB: 1925  Date of evaluation: 12/30/2020  Time:  11:36 AM     Procedure Summary     Date: 12/30/20 Room / Location: [address]    Anesthesia Start: 1627 Anesthesia Stop: 3114    Procedure: RIGHT HIP PINNING WITH C ARM (Right Hip) Diagnosis: (RIGHT HIP FRACTURE)    Surgeons: Erin Fields MD Responsible Provider: Desiree Flores MD    Anesthesia Type: general ASA Status: 3          Anesthesia Type: general    Binh Phase I: Binh Score: 8    Binh Phase II:      Last vitals: Reviewed and per EMR flowsheets.        Anesthesia Post Evaluation    Patient location during evaluation: PACU  Level of consciousness: awake  Airway patency: patent  Complications: no  Cardiovascular status: hemodynamically stable  Respiratory status: acceptable

## 2020-12-30 NOTE — H&P
Preoperative H&P Update    The patient's History and Physical in the medical record from 12/29/20 was reviewed by me today. I reviewed the HPI, medications, allergies, reason for surgery, diagnosis and treatment plan and there has been no interval change. The patient was examined by me today.  Physical exam findings for this update include:    BP (!) 142/62   Pulse 63   Temp 97.3 °F (36.3 °C) (Temporal)   Resp 20   Ht 5' 2\" (1.575 m)   Wt 104 lb (47.2 kg)   SpO2 100%   BMI 19.02 kg/m²    Airway is intact  Chest: breathing comfortably  Heart: regular rate  Findings on exam of the body region where surgery is to be performed include: see last office and/or consult note      Electronically signed by Mitchell Cisneros MD on 12/30/2020 at 9:33 AM

## 2020-12-30 NOTE — PROGRESS NOTES
Morning assessment completed, pt to transport down for surgery at 0900. All belongings removed from bed, pt in hospital gown only, hearing aids removed. Pt remains NPO. Care plan and education were reviewed with patient and mutually agreed upon. Reviewed potential for falls and safety measures. Patient verbalized understanding and denies any need at this time. Will continue to monitor. 1145  Pt returned from surgery, VSS, pt drowsy but awakens to voice. Neuro checks WNL, dressing to right hip CDI.

## 2020-12-30 NOTE — PROGRESS NOTES
Pt transferred back to room 469. Handoff given to [facility] RN. Pt stable. Vss. Hearing aids on bedside table.

## 2020-12-30 NOTE — BRIEF OP NOTE
Brief Postoperative Note      Patient: Eri Jernigan  YOB: 1925  MRN: [id number]    Date of Procedure: 12/30/2020    Pre-Op Diagnosis: RIGHT NONDISPLACED FEMORAL NECK FRACTURE    Post-Op Diagnosis: Same       Procedure(s):  RIGHT HIP PINNING     Surgeon(s):  Tiny Moreira MD    Assistant:  Surgical Assistant: Ines Schultz    Anesthesia: General    Estimated Blood Loss (mL): Minimal    Complications: None    Specimens:   * No specimens in log *    Implants:  Implant Name Type Inv.  Item Serial No.  Lot No. LRB No. Used Action   SCREW BNE L80MM DIA6.5MM CANC DEREJE TI SELF DRL [employer]  SCREW BNE L80MM DIA6.5MM CANC DEREJE TI SELF DRL [employer]  [employer] [facility]  Right 1 Implanted   SCREW BNE L85MM DIA6.5MM NONSTERILE CANC DEREJE TI SELF DRL  SCREW BNE L85MM DIA6.5MM NONSTERILE CANC DEREJE TI SELF DRL  [employer] [facility]  Right 2 Implanted         Drains:   [REMOVED] External Urinary Catheter (Removed)   Catheter changed  Yes 12/30/20 0002   Urine Color Yellow 12/30/20 0530   Urine Appearance Clear 12/30/20 0530   Output (mL) 100 mL 12/30/20 0530           Electronically signed by Tiny Moreira MD on 12/30/2020 at 10:39 AM

## 2020-12-30 NOTE — PROGRESS NOTES
Telephone consent for right hip percutaneous pinning with Dr. Rosa Chu obtained through patient's son Steven Garay. Consent form is in chart.

## 2020-12-31 LAB
ANION GAP SERPL CALCULATED.3IONS-SCNC: 6 MMOL/L (ref 3–16)
BUN BLDV-MCNC: 12 MG/DL (ref 7–20)
CALCIUM SERPL-MCNC: 8.6 MG/DL (ref 8.3–10.6)
CHLORIDE BLD-SCNC: 103 MMOL/L (ref 99–110)
CO2: 28 MMOL/L (ref 21–32)
CREAT SERPL-MCNC: 0.5 MG/DL (ref 0.6–1.2)
GFR AFRICAN AMERICAN: >60
GFR NON-AFRICAN AMERICAN: >60
GLUCOSE BLD-MCNC: 96 MG/DL (ref 70–99)
HCT VFR BLD CALC: 31.1 % (ref 36–48)
HEMOGLOBIN: 10 G/DL (ref 12–16)
MCH RBC QN AUTO: 29.5 PG (ref 26–34)
MCHC RBC AUTO-ENTMCNC: 32.3 G/DL (ref 31–36)
MCV RBC AUTO: 91.3 FL (ref 80–100)
PDW BLD-RTO: 15.6 % (ref 12.4–15.4)
PLATELET # BLD: 139 K/UL (ref 135–450)
PMV BLD AUTO: 8.5 FL (ref 5–10.5)
POTASSIUM SERPL-SCNC: 3.9 MMOL/L (ref 3.5–5.1)
RBC # BLD: 3.4 M/UL (ref 4–5.2)
SODIUM BLD-SCNC: 137 MMOL/L (ref 136–145)
WBC # BLD: 5.3 K/UL (ref 4–11)

## 2020-12-31 PROCEDURE — 97535 SELF CARE MNGMENT TRAINING: CPT

## 2020-12-31 PROCEDURE — 97166 OT EVAL MOD COMPLEX 45 MIN: CPT

## 2020-12-31 PROCEDURE — 99024 POSTOP FOLLOW-UP VISIT: CPT | Performed by: NURSE PRACTITIONER

## 2020-12-31 PROCEDURE — 97530 THERAPEUTIC ACTIVITIES: CPT

## 2020-12-31 PROCEDURE — 6360000002 HC RX W HCPCS: Performed by: ORTHOPAEDIC SURGERY

## 2020-12-31 PROCEDURE — 2580000003 HC RX 258: Performed by: ANESTHESIOLOGY

## 2020-12-31 PROCEDURE — 85027 COMPLETE CBC AUTOMATED: CPT

## 2020-12-31 PROCEDURE — 36415 COLL VENOUS BLD VENIPUNCTURE: CPT

## 2020-12-31 PROCEDURE — 80048 BASIC METABOLIC PNL TOTAL CA: CPT

## 2020-12-31 PROCEDURE — 2580000003 HC RX 258: Performed by: ORTHOPAEDIC SURGERY

## 2020-12-31 PROCEDURE — 97162 PT EVAL MOD COMPLEX 30 MIN: CPT

## 2020-12-31 PROCEDURE — 6370000000 HC RX 637 (ALT 250 FOR IP): Performed by: ORTHOPAEDIC SURGERY

## 2020-12-31 PROCEDURE — 1200000000 HC SEMI PRIVATE

## 2020-12-31 PROCEDURE — APPNB45 APP NON BILLABLE 31-45 MINUTES: Performed by: NURSE PRACTITIONER

## 2020-12-31 RX ADMIN — ENOXAPARIN SODIUM 40 MG: 40 INJECTION SUBCUTANEOUS at 22:05

## 2020-12-31 RX ADMIN — STANDARDIZED SENNA CONCENTRATE AND DOCUSATE SODIUM 1 TABLET: 8.6; 5 TABLET ORAL at 08:10

## 2020-12-31 RX ADMIN — Medication 10 ML: at 22:05

## 2020-12-31 RX ADMIN — STANDARDIZED SENNA CONCENTRATE AND DOCUSATE SODIUM 1 TABLET: 8.6; 5 TABLET ORAL at 22:06

## 2020-12-31 RX ADMIN — Medication 10 ML: at 22:09

## 2020-12-31 RX ADMIN — SODIUM CHLORIDE: 9 INJECTION, SOLUTION INTRAVENOUS at 06:12

## 2020-12-31 RX ADMIN — CEFAZOLIN SODIUM 2 G: 10 INJECTION, POWDER, FOR SOLUTION INTRAVENOUS at 01:59

## 2020-12-31 ASSESSMENT — PAIN SCALES - WONG BAKER: WONGBAKER_NUMERICALRESPONSE: 0

## 2020-12-31 ASSESSMENT — PAIN SCALES - GENERAL: PAINLEVEL_OUTOF10: 0

## 2020-12-31 NOTE — PROGRESS NOTES
Morning med pass and assessment completed. Pt found in room with O2 removed from nose, O2 sat 87%. Nasal cannula replaced and O2 sat went up to 99%. Pt denies pain at this time, pt has weak flexion/extension in BLE, pulses palpable and extremities warm. Care plan and education were reviewed with patient and mutually agreed upon. Reviewed potential for falls and safety measures. Patient verbalized understanding and denies any need at this time. Will continue to monitor.

## 2020-12-31 NOTE — PROGRESS NOTES
Incentive Spirometry education and demonstration completed by Respiratory Therapy Yes      Response to education: Poor     Teaching Time: 5 minutes    Minimum Predicted Vital Capacity - 501 mL. Patient's Actual Vital Capacity - 0 mL.  Turning over to Nursing for routine follow-up No.    Comments: Pt unable to follow commands unable to do IS    Electronically signed by Stefano Lloyd RCP on 12/30/2020 at 8:51 PM

## 2020-12-31 NOTE — PROGRESS NOTES
Occupational Therapy   Occupational Therapy Initial Assessment  Date: 2020   Patient Name: Jet Santoro  MRN: [id number]     : 1925    Date of Service: 2020    Discharge Recommendations:  Jet Santoro scored a 10/24 on the AM-PAC ADL Inpatient form. Current research shows that an AM-PAC score of 17 or less is typically not associated with a discharge to the patient's home setting. Based on the patient's AM-PAC score and their current ADL deficits, it is recommended that the patient have 3-5 sessions per week of Occupational Therapy at d/c to increase the patient's independence. Please see assessment section for further patient specific details. If patient discharges prior to next session this note will serve as a discharge summary. Please see below for the latest assessment towards goals. OT Equipment Recommendations  Equipment Needed: No(TBD at facility)    Assessment   Performance deficits / Impairments: Decreased functional mobility ; Decreased ADL status; Decreased cognition;Decreased endurance;Decreased balance;Decreased safe awareness  Assessment: Pt is below her baseline level of occupationa function, based on the above deficits associated with R hip fx, s/p pinning. Pt would benefit from continued skilled acute OT services to address these deficits. Treatment Diagnosis: Decreased ADL status, functional mobility, endurance, cognition, safety awareness and balance associated with R hip fx, s/p pinning  Prognosis: Fair  Decision Making: Medium Complexity  History: Pt 79 yo, lives in [place], assist for ADLs, nonambulationy.  PMH: CHF, dementia, A-fib, breast Ca  Exam: ROM, MMT, 6 clicks, 6 peformance deficits/impairments, evolving presentation  Assistance / Modification: D of 2 bed mobility, Max A of 1 & min to Mod A of 1 transfers, D LB dressing, D toileting  OT Education: OT Role;Plan of Care;Orientation;Transfer Training  Patient Education: Pt very limited in understanding d/t severe hearing loss and cognitive deficit. Needs constant reinforcement. Barriers to Learning: Hearing, cognition  REQUIRES OT FOLLOW UP: Yes  Activity Tolerance  Activity Tolerance: Patient Tolerated treatment well;Treatment limited secondary to decreased cognition  Safety Devices  Safety Devices in place: Yes  Type of devices: All fall risk precautions in place; Left in chair;Nurse notified;Call light within reach; Chair alarm in place;Gait belt;Patient at risk for falls           Patient Diagnosis(es): The primary encounter diagnosis was Closed subcapital fracture of right femur, initial encounter ([facility] [address] Diagnoses of Unwitnessed fall, Dementia without behavioral disturbance, unspecified dementia type ([facility] [address] and Hypoxia were also pertinent to this visit. has a past medical history of ASHD (arteriosclerotic heart disease), Atrial fibrillation ([facility] [address] Cancer ([facility] [address] CHF (congestive heart failure) ([facility] [address] Hyperlipidemia, Hypertension, Seasonal allergies, and Thyroid disease. has a past surgical history that includes eye surgery; Hysterectomy; hip surgery (Left, 7-26-14); and Hip fracture surgery (Right, 12/30/2020). Treatment Diagnosis: Decreased ADL status, functional mobility, endurance, cognition, safety awareness and balance associated with R hip fx, s/p pinning      Restrictions  Restrictions/Precautions  Restrictions/Precautions: Weight Bearing, Fall Risk  Required Braces or Orthoses?: No  Lower Extremity Weight Bearing Restrictions  Right Lower Extremity Weight Bearing: Partial Weight Bearing  Partial Weight Bearing Percentage Or Pounds: 25% PWB  Position Activity Restriction  Other position/activity restrictions: Rocío Guerra is a 80 y.o. female  who presents to the ED complaining of unwitnessed fall, being found on the ground between the door and bed. Unclear how long she may have been down, she is notably demented without ability to provide any meaningful history herself.   She does not even tell me where she hurts or what her name is or what is going on. She is DNR-CC. She is hypoxic without known baseline oxygen requirement on arrival ant comfortable on [place] oxygen. No known recent acute illnesses. She has a mild cough on my exam.  She seems tender in the R femur when palpated but doesn't respond to questions even though she makes eye contact when asking her questions. In previous documentation as well as me seeing her last summer, I recall that she has essentially severe dementia and unable to provide much helpful history herself. Subjective   General  Chart Reviewed: Yes  Family / Caregiver Present: No  Referring Practitioner: Brandyn Ravi MD, for d/c planning  Diagnosis: R hip fx, s/p pinning  Subjective  Subjective: Pt sitting in semi-caldwell's position in bed. Pt denies pain. Pt has difficulty understanding d/t severe hearing loss, even with hearing aids. General Comment  Comments: OT inserted pt's hearing aids, but pt not significantly able to hear. May need new batteries. Patient Currently in Pain: Denies  Pre Treatment Pain Screening  Intervention List: Patient able to continue with treatment  Vital Signs  Patient Currently in Pain: Denies  Social/Functional History  Social/Functional History  Type of Home: Assisted living(at [facility])  Home Layout: One level  Home Equipment: Wheelchair-manual  ADL Assistance: Needs assistance(based on pt's participation in ADLs during session)  Homemaking Responsibilities: No  Ambulation Assistance: Needs assistance(Pt reports doesn't ambulate; is in w/c)  Transfer Assistance: Needs assistance(based on pt report)  Additional Comments: Pt had a fall PTA. Pt poor historian d/t cognitive and hearing deficits.  Per chart, pt doesn't walk since bout of pneumonia ~1 year ago       Objective   Hearing: Exceptions to WFL(Extremely [place]; hearing aids)  Hearing Exceptions: Hard of hearing/hearing concerns;Bilateral hearing aid(Hearing aids don't help; pt seems to hear slightly better in R ear w/HA)    Orientation  Overall Orientation Status: Impaired  Orientation Level: Oriented to place;Oriented to person;Disoriented to time;Disoriented to situation  Observation/Palpation  Posture: Fair  Balance  Standing Balance: Dependent/Total(Max A of 1 & Min to Mod A of 1)  Standing Balance  Time: ~5 sec, ~5 sec, ~1 min, ~20 sec  Activity: transfer EOB to recliner; transfer recliner to [facility] w/removal of brief; jennie/anal hygiene; jennie/anal hygiene & transfer to recliner  Comment: PT assisting w/WB status  Toilet Transfers  Toilet - Technique: Stand pivot; To left  Equipment Used: Standard bedside commode  Toilet Transfer: 2 Person assistance;Dependent/Total(Max A of 1 & Min A of 1)  ADL  Grooming: Stand by assistance;Setup;Verbal cueing; Increased time to complete(wash face; increased time to initiate)  LE Bathing: Maximum assistance; Increased time to complete;Verbal cueing; Other (Comment)(tactile cueing)  UE Dressing: Maximum assistance(gown)  LE Dressing: Dependent/Total(socks, briefs X 2 trials; 1st briefs wet, 2nd time, after BM)  Toileting: Dependent/Total  Tone RUE  RUE Tone: Normotonic  Tone LUE  LUE Tone: Normotonic  Coordination  Movements Are Fluid And Coordinated: (MARYAM d/t cognition)     Bed mobility  Supine to Sit: 2 Person assistance;Dependent/Total  Scooting: Dependent/Total;2 Person assistance  Transfers  Stand Pivot Transfer: 2 Person assistance;Dependent/Total  Sit to stand: Dependent/Total(Max A of 1 & Mod A of 1 from EOB, from recliner, Max A of 1 & Min A of 1 from [facility])  Stand to sit: Dependent/Total;2 Person assistance(Max A of 1 & Mod to Min A of 1; Max A of 1)  Transfer Comments: Pt assisting w/25% WB. Vision - Basic Assessment  Visual History: Surgical intervention(unspecified)  Patient Visual Report: No visual complaint reported.   Cognition  Overall Cognitive Status: Exceptions  Arousal/Alertness: Delayed responses to stimuli;Inconsistent responses to stimuli(Pt extremely [place])  Following Commands: Inconsistently follows commands; Follows one step commands with increased time; Follows one step commands with repetition  Attention Span: Attends with cues to redirect  Memory: Decreased recall of biographical Information;Decreased recall of recent events;Decreased short term memory  Problem Solving: Decreased awareness of errors;Assistance required to identify errors made;Assistance required to correct errors made  Insights: Not aware of deficits  Initiation: Requires cues for all  Sequencing: Requires cues for all        Sensation  Overall Sensation Status: WFL        LUE PROM (degrees)  LUE PROM: Exceptions  LUE General PROM: shoulder flexion ~90 degrees, elbow to wrist WFL  Left Hand AROM (degrees)  Left Hand AROM: WFL  RUE PROM (degrees)  RUE PROM: Exceptions  RUE General PROM: shoulder flexion ~30 degrees, elbow to wrist WFL  Right Hand PROM (degrees)  Right Hand PROM: WFL  LUE Strength  LUE Strength Comment: MARYAM; pt unable to follow command for MMT  RUE Strength  RUE Strength Comment: MARYAM; pt unable to follow command for MMT                   Plan   Plan  Times per week: 7  Times per day: Daily  Current Treatment Recommendations: Safety Education & Training, Self-Care / ADL, Endurance Training, Functional Mobility Training, Positioning      AM-PAC Score        -[facility] Inpatient Daily Activity Raw Score: 10 (12/31/20 1133)  AM-PAC Inpatient ADL T-Scale Score : 27.31 (12/31/20 1133)  ADL Inpatient CMS 0-100% Score: 74.7 (12/31/20 1133)  ADL Inpatient CMS G-Code Modifier : CL (12/31/20 1133)    Goals  Short term goals  Time Frame for Short term goals: Discharge  Short term goal 1: Mod A for bed mobility  Short term goal 2: Mod A for functional transfers for ADL activity, maintaining 25% WB  Short term goal 3: Min A for UB bathing  Short term goal 4: Min A for grooming  Short term goal 5: Pt to tolerate standing 2 min for standing ADL activity w/Mod A for standing balance & maintaining 25% WB       Therapy Time   Individual Concurrent Group Co-treatment   Time In 0928         Time Out 1038         Minutes 70               Timed Code Treatment Minutes:  55 Minutes    Total Treatment Minutes:  70 min    [address], Jeanie Goodell., OTR/L, [id number]

## 2020-12-31 NOTE — PROGRESS NOTES
Physical Therapy    Facility/Department: [address] ORTHO/NEURO NURSING  Initial Assessment    NAME: Mary Boone  : 1925  MRN: [id number]    Date of Service: 2020    Discharge Recommendations:Eva Knott scored a 6/24 on the AM-PAC short mobility form. Current research shows that an AM-PAC score of 17 or less is typically not associated with a discharge to the patient's home setting. Based on the patient's AM-PAC score and their current functional mobility deficits, it is recommended that the patient have 3-5 sessions per week of Physical Therapy at d/c to increase the patient's independence. Please see assessment section for further patient specific details. If patient discharges prior to next session this note will serve as a discharge summary. Please see below for the latest assessment towards goals. PT Equipment Recommendations  Equipment Needed: No    Assessment   Body structures, Functions, Activity limitations: Decreased functional mobility ; Decreased ADL status; Decreased ROM; Decreased strength;Decreased cognition;Decreased endurance;Decreased balance; Increased pain  Assessment: Pt presents as below her baseline function and would benefit from skilled PT services to promote safe return to PLOF. Prognosis: Fair  Decision Making: Medium Complexity  PT Education: Goals;PT Role;Plan of Care  REQUIRES PT FOLLOW UP: Yes  Activity Tolerance  Activity Tolerance: Patient limited by pain; Patient limited by cognitive status       Patient Diagnosis(es): The primary encounter diagnosis was Closed subcapital fracture of right femur, initial encounter ([facility] [address] Diagnoses of Unwitnessed fall, Dementia without behavioral disturbance, unspecified dementia type ([address] and Hypoxia were also pertinent to this visit.      has a past medical history of ASHD (arteriosclerotic heart disease), Atrial fibrillation ([facility] [address] Cancer ([facility] [address] CHF (congestive heart failure) ([facility] [address] Hyperlipidemia, Hypertension, Seasonal allergies, and Thyroid disease. has a past surgical history that includes eye surgery; Hysterectomy; hip surgery (Left, 7-26-14); and Hip fracture surgery (Right, 12/30/2020). Restrictions  Restrictions/Precautions  Restrictions/Precautions: Weight Bearing, Fall Risk  Required Braces or Orthoses?: No  Lower Extremity Weight Bearing Restrictions  Right Lower Extremity Weight Bearing: Partial Weight Bearing  Partial Weight Bearing Percentage Or Pounds: 25% PWB  Position Activity Restriction  Other position/activity restrictions: Cecilia Elliott is a 80 y.o. female  who presents to the ED complaining of unwitnessed fall, being found on the ground between the door and bed. Unclear how long she may have been down, she is notably demented without ability to provide any meaningful history herself. She does not even tell me where she hurts or what her name is or what is going on. She is DNR-CC. She is hypoxic without known baseline oxygen requirement on arrival ant comfortable on [place] oxygen. No known recent acute illnesses. She has a mild cough on my exam.  She seems tender in the R femur when palpated but doesn't respond to questions even though she makes eye contact when asking her questions. In previous documentation as well as me seeing her last summer, I recall that she has essentially severe dementia and unable to provide much helpful history herself.   Vision/Hearing  Hearing: Exceptions to WFL(Extremely [place]; hearing aids)  Hearing Exceptions: Hard of hearing/hearing concerns;Bilateral hearing aid(Hearing aids don't help; pt seems to hear slightly better in R ear w/HA)     Subjective  General  Chart Reviewed: Yes  Response To Previous Treatment: Not applicable  Family / Caregiver Present: No  Diagnosis: Closed R hip fracture  Follows Commands: Impaired(very [place])  General Comment  Comments: Pt supine in bed upon arrival.  Subjective  Subjective: Pt agreeable to PT/OT eval.  Pain Screening  Patient Currently in Pain: Denies  Vital Signs  Patient Currently in Pain: Denies       Orientation  Orientation  Overall Orientation Status: Impaired  Orientation Level: Oriented to person;Oriented to place; Disoriented to time;Disoriented to situation  Social/Functional History  Social/Functional History  Type of Home: Assisted living(at [facility])  Home Layout: One level  Home Equipment: Wheelchair-manual  ADL Assistance: Needs assistance(based on pt's participation in ADLs during session)  Homemaking Responsibilities: No  Ambulation Assistance: Needs assistance(Pt reports doesn't ambulate; is in w/c)  Transfer Assistance: Needs assistance(based on pt report)  Additional Comments: Pt had a fall PTA. Pt poor historian d/t cognitive and hearing deficits. Per chart, pt doesn't walk since bout of pneumonia ~1 year ago  Cognition        Objective     Observation/Palpation  Posture: Fair    AROM RLE (degrees)  RLE General AROM: Limited active motion noted, difficult to assess due to cognition/hearing for command following. Limited by weakness and pain  AROM LLE (degrees)  LLE General AROM: Limited active motion noted, difficult to assess due to cognition/hearing for command following. Limited by weakness and pain  Strength RLE  Strength RLE: WFL  Strength LLE  Strength LLE: WFL  Tone RLE  RLE Tone: Normotonic  Tone LLE  LLE Tone: Normotonic  Motor Control  Gross Motor?: WFL  Sensation  Overall Sensation Status: WFL  Bed mobility  Supine to Sit: 2 Person assistance;Dependent/Total  Scooting: Dependent/Total;2 Person assistance  Comment: Max A x2  Transfers  Sit to Stand: Dependent/Total;2 Person Assistance  Stand to sit: Dependent/Total;2 Person Assistance  Stand Pivot Transfers: Dependent/Total;2 Person Assistance  Comment: Max + Mod A for transfers.  Completed x1 from bed to recliner, x1 from recliner to [facility], x2 stands fro [facility] and x1 from [facility] to recliner  Ambulation  Ambulation?: No  WB Status: 2%% PWB RLE  Stairs/Curb  Stairs?: No     Balance  Posture: Fair  Sitting - Static: Fair;+  Sitting - Dynamic: Fair  Standing - Static: Poor  Standing - Dynamic: Poor        Plan   Plan  Times per week: 7x  Times per day: Daily  Current Treatment Recommendations: Strengthening, ROM, Balance Training, Functional Mobility Training, Transfer Training, Endurance Training, Gait Training, Stair training, Safety Education & Training, Patient/Caregiver Education & Training  Safety Devices  Type of devices: All fall risk precautions in place, Call light within reach, Chair alarm in place, Gait belt, Patient at risk for falls, Left in chair, Nurse notified  Restraints  Initially in place: No    G-Code       OutComes Score                                                  AM-PAC Score  AM-PAC Inpatient Mobility Raw Score : 6 (12/31/20 1123)  AM-PAC Inpatient T-Scale Score : 23.55 (12/31/20 1123)  Mobility Inpatient CMS 0-100% Score: 100 (12/31/20 1123)  Mobility Inpatient CMS G-Code Modifier : CN (12/31/20 1123)          Goals  Short term goals  Time Frame for Short term goals:  To be met prior to discharge  Short term goal 1: Pt will complete bed mobility with mod A  Short term goal 2: Pt will complete sit to/from stand with max A  Short term goal 3: Pt will complete stand pivot transfer with max A       Therapy Time   Individual Concurrent Group Co-treatment   Time In 0928         Time Out 1038         Minutes 70         Timed Code Treatment Minutes: [address] [facility], PT   Ghislaine Dotson, [address] [facility], [place], [phone]

## 2020-12-31 NOTE — PROGRESS NOTES
Shift assessment completed. Medications given per MAR. Patient resting in bed without distress. Dressing over right hip surgical incision clean, dry, and intact. Bed alarm on and fall precaution in place. The care plan and education has been reviewed.

## 2020-12-31 NOTE — PLAN OF CARE
Problem: Falls - Risk of:  Goal: Will remain free from falls  Description: Will remain free from falls  Outcome: Ongoing  Goal: Absence of physical injury  Description: Absence of physical injury  Outcome: Ongoing     Problem: Skin Integrity:  Goal: Will show no infection signs and symptoms  Description: Will show no infection signs and symptoms  Outcome: Ongoing  Goal: Absence of new skin breakdown  Description: Absence of new skin breakdown  Outcome: Ongoing     Problem: Pain:  Goal: Pain level will decrease  Description: Pain level will decrease  Outcome: Ongoing  Goal: Control of acute pain  Description: Control of acute pain  Outcome: Ongoing  Goal: Control of chronic pain  Description: Control of chronic pain  Outcome: Ongoing     Problem:  Activity:  Goal: Ability to ambulate will improve  Description: Ability to ambulate will improve  Outcome: Ongoing  Goal: Ability to perform activities at highest level will improve  Description: Ability to perform activities at highest level will improve  Outcome: Ongoing     Problem: Physical Regulation:  Goal: Will remain free from infection  Description: Will remain free from infection  Outcome: Ongoing  Goal: Postoperative complications will be avoided or minimized  Description: Postoperative complications will be avoided or minimized  Outcome: Ongoing  Goal: Diagnostic test results will improve  Description: Diagnostic test results will improve  Outcome: Ongoing

## 2020-12-31 NOTE — PROGRESS NOTES
[facility] Orthopedic Surgery   Progress Note    CHIEF COMPLAINT/DIAGNOSIS:  12/30/20 RIGHT hip percutaneous pinning    SUBJECTIVE: Patient is sitting up in the chair after having worked with therapy   She is quite hard of hearing. She denies hip pain currently. OBJECTIVE  Physical    VITALS:  BP (!) 165/75   Pulse 65   Temp 97.7 °F (36.5 °C) (Axillary)   Resp 16   Ht 5' 2\" (1.575 m)   Wt 104 lb (47.2 kg)   SpO2 99%   BMI 19.02 kg/m²     GENERAL: Sleeping, NAD  MUSCULOSKELETAL: RIGHT LE  INCISION:  Dressing c/d/i.  ROM: Intact DF/PF  Sensory:  Unable to assess. Vascular:   Intact DP pulse;  calf soft. Data    ALL MEDICATIONS HAVE BEEN REVIEWED    CBC:   Recent Labs     12/29/20  0034 12/31/20  0733   WBC 4.7 5.3   HGB 13.0 10.0*   HCT 40.1 31.1*    139     BMP:   Recent Labs     12/29/20 0034 12/31/20  0733    137   K 4.9 3.9    103   CO2 32 28   BUN 14 12   CREATININE 0.7 0.5*     INR:   Recent Labs     12/29/20  0034   INR 0.91       ASSESSMENT:  12/30/20 RIGHT hip percutaneous pinning, POD#1  Dementia  Atrial fibrillation (no A/C)  CHF  HLD  HTN  Thyroid disease  Acute blood loss anemia as expected    PLAN:   - WB status:  25% WB on operative leg: reviewed post op precautions  - DVT prophylaxis: Lovenox x 14 days  - PT/OT  - D/C Plan: Therapy recommending SNF; ok to d/c from ortho perspective. - Pain Control:not requiring narcotic medications- tylenol prn.  - Acute blood loss anemia as expected: Hg/dL 10 today. COVID negative 12/29/20    F U with Dr. Ricardo Roland in 2 weeks; call [phone] for appt.        SALVADOR Antonio-CNP  12/31/2020  9:29 AM

## 2021-01-01 LAB
EKG ATRIAL RATE: 55 BPM
EKG DIAGNOSIS: NORMAL
EKG P-R INTERVAL: 196 MS
EKG Q-T INTERVAL: 490 MS
EKG QRS DURATION: 118 MS
EKG QTC CALCULATION (BAZETT): 468 MS
EKG R AXIS: -54 DEGREES
EKG T AXIS: 116 DEGREES
EKG VENTRICULAR RATE: 55 BPM
HCT VFR BLD CALC: 35.3 % (ref 36–48)
HEMOGLOBIN: 11.5 G/DL (ref 12–16)
MCH RBC QN AUTO: 29.6 PG (ref 26–34)
MCHC RBC AUTO-ENTMCNC: 32.6 G/DL (ref 31–36)
MCV RBC AUTO: 91 FL (ref 80–100)
PDW BLD-RTO: 16 % (ref 12.4–15.4)
PLATELET # BLD: 172 K/UL (ref 135–450)
PMV BLD AUTO: 7.7 FL (ref 5–10.5)
RBC # BLD: 3.88 M/UL (ref 4–5.2)
WBC # BLD: 5.5 K/UL (ref 4–11)

## 2021-01-01 PROCEDURE — 2580000003 HC RX 258: Performed by: ANESTHESIOLOGY

## 2021-01-01 PROCEDURE — 1200000000 HC SEMI PRIVATE

## 2021-01-01 PROCEDURE — 6370000000 HC RX 637 (ALT 250 FOR IP): Performed by: ORTHOPAEDIC SURGERY

## 2021-01-01 PROCEDURE — 6360000002 HC RX W HCPCS: Performed by: ORTHOPAEDIC SURGERY

## 2021-01-01 PROCEDURE — 85027 COMPLETE CBC AUTOMATED: CPT

## 2021-01-01 PROCEDURE — 97535 SELF CARE MNGMENT TRAINING: CPT

## 2021-01-01 PROCEDURE — 97530 THERAPEUTIC ACTIVITIES: CPT

## 2021-01-01 RX ORDER — LORAZEPAM 0.5 MG/1
0.5 TABLET ORAL EVERY 8 HOURS PRN
Qty: 20 TABLET | Refills: 0 | Status: SHIPPED | OUTPATIENT
Start: 2021-01-01 | End: 2021-01-11

## 2021-01-01 RX ORDER — TRAMADOL HYDROCHLORIDE 50 MG/1
50 TABLET ORAL EVERY 8 HOURS PRN
Qty: 30 TABLET | Refills: 0 | Status: SHIPPED | OUTPATIENT
Start: 2021-01-01 | End: 2021-01-11

## 2021-01-01 RX ORDER — PROMETHAZINE HYDROCHLORIDE 12.5 MG/1
12.5 TABLET ORAL EVERY 6 HOURS PRN
Qty: 20 TABLET | Refills: 0 | Status: SHIPPED | OUTPATIENT
Start: 2021-01-01 | End: 2021-01-08

## 2021-01-01 RX ADMIN — ACETAMINOPHEN 650 MG: 325 TABLET ORAL at 11:38

## 2021-01-01 RX ADMIN — STANDARDIZED SENNA CONCENTRATE AND DOCUSATE SODIUM 1 TABLET: 8.6; 5 TABLET ORAL at 21:52

## 2021-01-01 RX ADMIN — Medication 10 ML: at 21:54

## 2021-01-01 RX ADMIN — ENOXAPARIN SODIUM 40 MG: 40 INJECTION SUBCUTANEOUS at 21:52

## 2021-01-01 RX ADMIN — STANDARDIZED SENNA CONCENTRATE AND DOCUSATE SODIUM 1 TABLET: 8.6; 5 TABLET ORAL at 11:38

## 2021-01-01 RX ADMIN — Medication 10 ML: at 11:38

## 2021-01-01 ASSESSMENT — PAIN SCALES - GENERAL
PAINLEVEL_OUTOF10: 0

## 2021-01-01 ASSESSMENT — PAIN SCALES - PAIN ASSESSMENT IN ADVANCED DEMENTIA (PAINAD)
CONSOLABILITY: 0
FACIALEXPRESSION: 1
BODYLANGUAGE: 0
BREATHING: 0

## 2021-01-01 NOTE — PROGRESS NOTES
Pt is alert and oriented to self only. Able to follow commands throughout assessment. Pt HR and RR within normal limits. Respirations were even and easy with no adventitious sounds. Bowel sounds active x4. Pt has +2 pedal pulses, full movement to toes, and full sensation to BLE. Pt demonstrated how to reach nurse with call light. Call light in reach. Will continue to monitor.

## 2021-01-01 NOTE — PROGRESS NOTES
Occupational Therapy  Facility/Department: [address] ORTHO/NEURO NURSING  Daily Treatment Note  NAME: Monique Rivas  : 1925  MRN: [id number]    Date of Service: 2021    Discharge Recommendations:  Monique Rivas scored a  on the AM-PAC ADL Inpatient form. Current research shows that an AM-PAC score of 17 or less is typically not associated with a discharge to the patient's home setting. Based on the patient's AM-PAC score and their current ADL deficits, it is recommended that the patient have 3-5 sessions per week of Occupational Therapy at d/c to increase the patient's independence. Please see assessment section for further patient specific details. If patient discharges prior to next session this note will serve as a discharge summary. Please see below for the latest assessment towards goals. OT Equipment Recommendations  Equipment Needed: No(TBD at next level of care)    Assessment   Performance deficits / Impairments: Decreased functional mobility ; Decreased ADL status; Decreased cognition;Decreased endurance;Decreased balance;Decreased safe awareness  Assessment: Pt is below her baseline level of occupational function, based on the above deficits associated with R hip fx, s/p pinning. Pt would benefit from continued skilled acute OT services to address these deficits. Treatment Diagnosis: Decreased ADL status, functional mobility, endurance, cognition, safety awareness and balance associated with R hip fx, s/p pinning  Prognosis: Fair  History: Pt 79 yo, lives in [place], assist for ADLs, nonambulationy.  PMH: CHF, dementia, A-fib, breast Ca  Exam: ROM, MMT, 6 clicks, 6 peformance deficits/impairments, evolving presentation  Assistance / Modification: D of 2 bed mobility, Max A of 1 & min to Mod A of 1 transfers, D LB dressing, D toileting  OT Education: OT Role;Plan of Care;Orientation;Transfer Training  Patient Education: Pt very limited in understanding d/t severe hearing loss and cognitive deficit. Needs constant reinforcement. Barriers to Learning: Hearing, cognition, vision  REQUIRES OT FOLLOW UP: Yes  Activity Tolerance  Activity Tolerance: Patient Tolerated treatment well;Treatment limited secondary to decreased cognition  Safety Devices  Safety Devices in place: Yes  Type of devices: All fall risk precautions in place; Left in chair;Nurse notified;Call light within reach; Chair alarm in place;Gait belt;Patient at risk for falls         Patient Diagnosis(es): The primary encounter diagnosis was Closed subcapital fracture of right femur, initial encounter ([facility] Diagnoses of Unwitnessed fall, Dementia without behavioral disturbance, unspecified dementia type ([facility] and Hypoxia were also pertinent to this visit. has a past medical history of ASHD (arteriosclerotic heart disease), Atrial fibrillation ([facility] Cancer ([facility] CHF (congestive heart failure) ([facility] Hyperlipidemia, Hypertension, Seasonal allergies, and Thyroid disease. has a past surgical history that includes eye surgery; Hysterectomy; hip surgery (Left, 7-26-14); and Hip fracture surgery (Right, 12/30/2020). Restrictions  Restrictions/Precautions  Restrictions/Precautions: Weight Bearing, Fall Risk  Required Braces or Orthoses?: No  Lower Extremity Weight Bearing Restrictions  Right Lower Extremity Weight Bearing: Partial Weight Bearing  Partial Weight Bearing Percentage Or Pounds: 25% PWB  Position Activity Restriction  Other position/activity restrictions: Arianne Ann is a 80 y.o. female  who presents to the ED complaining of unwitnessed fall, being found on the ground between the door and bed. Unclear how long she may have been down, she is notably demented without ability to provide any meaningful history herself. She does not even tell me where she hurts or what her name is or what is going on. She is DNR-CC. She is hypoxic without known baseline oxygen requirement on arrival ant comfortable on NC oxygen.   No known recent acute illnesses. She has a mild cough on my exam.  She seems tender in the R femur when palpated but doesn't respond to questions even though she makes eye contact when asking her questions. In previous documentation as well as me seeing her last summer, I recall that she has essentially severe dementia and unable to provide much helpful history herself. Sustained R hip fracture, s/p RIGHT hip percutaneous pinning  Subjective   General  Chart Reviewed: Yes  Family / Caregiver Present: No  Referring Practitioner: Brandyn Ravi MD, for d/c planning  Diagnosis: R hip fx, s/p pinning  Subjective  Subjective: Pt supine in bed, sleeping. Pt awakened easily, agreeable to OT/Pt cotx. Pt denies pain. Pt confused, talking about a party going on, children. Pt reported poor vision. \"I can hardly see. \"  General Comment  Comments: Pt with severe hearing loss, even with hearing aids. Pre Treatment Pain Screening  Intervention List: Patient able to continue with treatment  Vital Signs  Patient Currently in Pain: Denies   Orientation  Orientation  Overall Orientation Status: Impaired  Orientation Level: Oriented to person;Disoriented to time;Disoriented to situation;Disoriented to place  Objective    ADL  Feeding: Setup  Grooming: Setup; Moderate assistance(wash face w/SBA, D comb hair)  LE Dressing: Dependent/Total(briefs)  Toileting: Dependent/Total        Balance  Standing Balance: Dependent/Total(Max A of 2)  Standing Balance  Time: ~5 sec X 4 trials  Activity: transfer EOB to recliner, partial stand X 3 for jennie hygiene, placement of brief  Comment: Placed pt's R LE forward to avoid WB  Bed mobility  Supine to Sit: 2 Person assistance;Dependent/Total  Scooting: Dependent/Total;2 Person assistance  Transfers  Stand Pivot Transfers: 2 Person assistance;Dependent/Total(Max A of 2 to L)  Sit to stand: Dependent/Total(Max A of 2 from EOB, Max A of 2 X 3 trials partial stand from recliner)  Stand to sit: Dependent/Total;2 Person assistance(Max A of 1 & Mod to Min A of 1; Max A of 1)  Transfer Comments: Pt assisting w/25% WB. Cognition  Overall Cognitive Status: Exceptions  Arousal/Alertness: Delayed responses to stimuli;Inconsistent responses to stimuli(Pt extremely [place])  Following Commands: Inconsistently follows commands; Follows one step commands with increased time; Follows one step commands with repetition  Attention Span: Attends with cues to redirect  Memory: Decreased recall of biographical Information;Decreased recall of recent events;Decreased short term memory  Problem Solving: Decreased awareness of errors;Assistance required to identify errors made;Assistance required to correct errors made  Insights: Not aware of deficits  Initiation: Requires cues for all  Sequencing: Requires cues for some                                         Plan   Plan  Times per week: 7  Times per day: Daily  Current Treatment Recommendations: Safety Education & Training, Self-Care / ADL, Endurance Training, Functional Mobility Training, Positioning           AM-PAC Score        AM-[facility] Inpatient Daily Activity Raw Score: 11 (01/01/21 0957)  AM-PAC Inpatient ADL T-Scale Score : 29.04 (01/01/21 0957)  ADL Inpatient CMS 0-100% Score: 70.42 (01/01/21 0957)  ADL Inpatient CMS G-Code Modifier : CL (01/01/21 0957)    Goals  Short term goals  Time Frame for Short term goals: Discharge  Short term goal 1: Mod A for bed mobility--D of 2 supine to sit 1/1  Short term goal 2: Mod A for functional transfers for ADL activity, maintaining 25% WB--Max A of 2 SPT 1/1  Short term goal 3: Min A for UB bathing--Not addressed 1/1  Short term goal 4: Min A for grooming--Mod A 1/1  Short term goal 5: Pt to tolerate standing 2 min for standing ADL activity w/Mod A for standing balance & maintaining 25% WB--~5 sec X 4 1/2       Therapy Time   Individual Concurrent Group Co-treatment   Time In       4877   Time Out       0700 Minutes       40         Timed Code Treatment Minutes:  40 Minutes    Total Treatment Minutes:  40 min    C/ [address], [address], Sole Mejia., OTR/L, [id number]

## 2021-01-01 NOTE — PROGRESS NOTES
Department of Internal Medicine  General Internal Medicine   Progress Note      SUBJECTIVE: right hip post op pain      History obtained from chart review, the patient and nursing staff  General ROS: positive for  - fatigue, malaise, sleep disturbance and weight loss  negative for - chills, fever or night sweats  Psychological ROS: positive for - anxiety, disorientation, irritability and memory difficulties  negative for - behavioral disorder, hallucinations or hostility  Ophthalmic ROS: negative  Respiratory ROS: no cough, shortness of breath, or wheezing  Cardiovascular ROS: positive for - dyspnea on exertion  negative for - chest pain  Gastrointestinal ROS: no abdominal pain, change in bowel habits, or black or bloody stools  Genito-Urinary ROS: incontinent   Musculoskeletal ROS: right hip pain unable to ambulate   Neurological ROS: no TIA or stroke symptoms  Dermatological ROS: negative    OBJECTIVE      Medications      Current Facility-Administered Medications: sodium chloride flush 0.9 % injection 10 mL, 10 mL, Intravenous, 2 times per day  sodium chloride flush 0.9 % injection 10 mL, 10 mL, Intravenous, PRN  sennosides-docusate sodium (SENOKOT-S) 8.6-50 MG tablet 1 tablet, 1 tablet, Oral, BID  magnesium hydroxide (MILK OF MAGNESIA) 400 MG/5ML suspension 30 mL, 30 mL, Oral, Daily PRN  0.9 % sodium chloride infusion, , Intravenous, Continuous  promethazine (PHENERGAN) tablet 12.5 mg, 12.5 mg, Oral, Q6H PRN **OR** ondansetron (ZOFRAN) injection 4 mg, 4 mg, Intravenous, Q6H PRN  enoxaparin (LOVENOX) injection 40 mg, 40 mg, Subcutaneous, Nightly  traMADol (ULTRAM) tablet 50 mg, 50 mg, Oral, Q6H PRN **OR** traMADol (ULTRAM) tablet 100 mg, 100 mg, Oral, Q6H PRN  morphine (PF) injection 2 mg, 2 mg, Intravenous, Q2H PRN **OR** morphine injection 4 mg, 4 mg, Intravenous, Q2H PRN  acetaminophen (TYLENOL) tablet 650 mg, 650 mg, Oral, Q4H PRN  lactated ringers infusion, , Intravenous, Continuous  sodium chloride flush 0.9 % injection 10 mL, 10 mL, Intravenous, 2 times per day  sodium chloride flush 0.9 % injection 10 mL, 10 mL, Intravenous, PRN    Physical      Vitals: BP (!) 152/74   Pulse 61   Temp 97.8 °F (36.6 °C) (Oral)   Resp 16   Ht 5' 2\" (1.575 m)   Wt 104 lb (47.2 kg)   SpO2 97%   BMI 19.02 kg/m²   Temp: Temp: 97.8 °F (36.6 °C)  Max: Temp  Av.9 °F (36.6 °C)  Min: 97.6 °F (36.4 °C)  Max: 98.3 °F (36.8 °C)  Respiration range:  Resp  Av  Min: 16  Max: 16  Pulse Range:  Pulse  Av.8  Min: 61  Max: 70  Blood pressure range:  Systolic (02LFC), XWH:507 , Min:132 , WPA:395   , Diastolic (98WJU), NXN:61, Min:63, Max:78    SpO2  Av %  Min: 92 %  Max: 99 %    Intake/Output Summary (Last 24 hours) at 2021 0240  Last data filed at 2020 5498  Gross per 24 hour   Intake 604 ml   Output --   Net 604 ml       Vent settings:  Pulse  Av.6  Min: 59  Max: 95  Resp  Av.7  Min: 1  Max: 29  SpO2  Av.9 %  Min: 83 %  Max: 100 %    CONSTITUTIONAL:  fatigued, somnolent, uncooperative, distracted, mild distress, appears stated age and thin  EYES:  Unremarkable   NECK:  No JVD  and supple, symmetrical, trachea midline  BACK:  Kyphotic  and symmetric  LUNGS:  Poor inspiration , vesicular  No wheezes   CARDIOVASCULAR:  RRR S1 S2, normal apical pulses, regular rate and rhythm and normal S1 and S2  ABDOMEN:  Soft scaphoid BS + non tender   MUSCULOSKELETAL:  Tenderness right hip restricted ROM  NEUROLOGIC:  General weakness poor co-ordination   SKIN:  Warm dry and pale  and no bruising or bleeding    Data      Recent Results (from the past 96 hour(s))   EKG 12 Lead    Collection Time: 20 11:48 PM   Result Value Ref Range    Ventricular Rate 55 BPM    Atrial Rate 55 BPM    P-R Interval 196 ms    QRS Duration 118 ms    Q-T Interval 490 ms    QTc Calculation (Bazett) 468 ms    R Axis -54 degrees    T Axis 116 degrees    Diagnosis       Sinus bradycardiaLeft axis deviationInferior infarct , age undeterminedAnteroseptal infarct , age undeterminedST & T wave abnormality, consider lateral ischemiaAbnormal ECG   POCT Glucose    Collection Time: 12/28/20 11:51 PM   Result Value Ref Range    POC Glucose 115 (H) 70 - 99 mg/dl    Performed on ACCU-CHEK    POCT glucose    Collection Time: 12/28/20 11:54 PM   Result Value Ref Range    Glucose 115 mg/dL    QC OK?  yes    CBC auto differential    Collection Time: 12/29/20 12:34 AM   Result Value Ref Range    WBC 4.7 4.0 - 11.0 K/uL    RBC 4.42 4.00 - 5.20 M/uL    Hemoglobin 13.0 12.0 - 16.0 g/dL    Hematocrit 40.1 36.0 - 48.0 %    MCV 90.6 80.0 - 100.0 fL    MCH 29.3 26.0 - 34.0 pg    MCHC 32.3 31.0 - 36.0 g/dL    RDW 16.1 (H) 12.4 - 15.4 %    Platelets 391 888 - 757 K/uL    MPV 7.8 5.0 - 10.5 fL    Neutrophils % 73.7 %    Lymphocytes % 17.2 %    Monocytes % 3.7 %    Eosinophils % 3.1 %    Basophils % 2.3 %    Neutrophils Absolute 3.4 1.7 - 7.7 K/uL    Lymphocytes Absolute 0.8 (L) 1.0 - 5.1 K/uL    Monocytes Absolute 0.2 0.0 - 1.3 K/uL    Eosinophils Absolute 0.1 0.0 - 0.6 K/uL    Basophils Absolute 0.1 0.0 - 0.2 K/uL   Comprehensive Metabolic Panel w/ Reflex to MG    Collection Time: 12/29/20 12:34 AM   Result Value Ref Range    Sodium 140 136 - 145 mmol/L    Potassium reflex Magnesium 4.9 3.5 - 5.1 mmol/L    Chloride 100 99 - 110 mmol/L    CO2 32 21 - 32 mmol/L    Anion Gap 8 3 - 16    Glucose 121 (H) 70 - 99 mg/dL    BUN 14 7 - 20 mg/dL    CREATININE 0.7 0.6 - 1.2 mg/dL    GFR Non-African American >60 >60    GFR African American >60 >60    Calcium 9.2 8.3 - 10.6 mg/dL    Total Protein 7.1 6.4 - 8.2 g/dL    Alb 3.8 3.4 - 5.0 g/dL    Albumin/Globulin Ratio 1.2 1.1 - 2.2    Total Bilirubin 0.5 0.0 - 1.0 mg/dL    Alkaline Phosphatase 73 40 - 129 U/L    ALT 15 10 - 40 U/L    AST 23 15 - 37 U/L    Globulin 3.3 g/dL   CK    Collection Time: 12/29/20 12:34 AM   Result Value Ref Range    Total CK 71 26 - 192 U/L   Troponin    Collection Time: 12/29/20 12:34 AM   Result Value Ref Range    Troponin <0.01 <0.01 ng/mL   Brain Natriuretic Peptide    Collection Time: 12/29/20 12:34 AM   Result Value Ref Range    Pro- (H) 0 - 449 pg/mL   Protime-INR    Collection Time: 12/29/20 12:34 AM   Result Value Ref Range    Protime 10.6 10.0 - 13.2 sec    INR 0.91 0.86 - 1.14   COVID-19    Collection Time: 12/29/20 12:34 AM   Result Value Ref Range    SARS-CoV-2 Not Detected Not detected   Urinalysis Reflex to Culture    Collection Time: 12/29/20  7:07 AM    Specimen: Urine, clean catch   Result Value Ref Range    Color, UA YELLOW Straw/Yellow    Clarity, UA TURBID (A) Clear    Glucose, Ur Negative Negative mg/dL    Bilirubin Urine Negative Negative    Ketones, Urine Negative Negative mg/dL    Specific Gravity, UA 1.017 1.005 - 1.030    Blood, Urine Negative Negative    pH, UA 7.5 5.0 - 8.0    Protein, UA Negative Negative mg/dL    Urobilinogen, Urine 1.0 <2.0 E.U./dL    Nitrite, Urine POSITIVE (A) Negative    Leukocyte Esterase, Urine Negative Negative    Microscopic Examination YES     Urine Type NotGiven     Urine Reflex to Culture Not Indicated    Microscopic Urinalysis    Collection Time: 12/29/20  7:07 AM   Result Value Ref Range    Bacteria, UA 4+ (A) None Seen /HPF    Hyaline Casts, UA 0 0 - 8 /LPF    WBC, UA 1 0 - 5 /HPF    RBC, UA 1 0 - 4 /HPF    Epithelial Cells, UA 0 0 - 5 /HPF   Basic Metabolic Panel    Collection Time: 12/31/20  7:33 AM   Result Value Ref Range    Sodium 137 136 - 145 mmol/L    Potassium 3.9 3.5 - 5.1 mmol/L    Chloride 103 99 - 110 mmol/L    CO2 28 21 - 32 mmol/L    Anion Gap 6 3 - 16    Glucose 96 70 - 99 mg/dL    BUN 12 7 - 20 mg/dL    CREATININE 0.5 (L) 0.6 - 1.2 mg/dL    GFR Non-African American >60 >60    GFR African American >60 >60    Calcium 8.6 8.3 - 10.6 mg/dL   CBC    Collection Time: 12/31/20  7:33 AM   Result Value Ref Range    WBC 5.3 4.0 - 11.0 K/uL    RBC 3.40 (L) 4.00 - 5.20 M/uL    Hemoglobin 10.0 (L) 12.0 - 16.0 g/dL    Hematocrit 31.1 (L) 36.0 - 48.0 %    MCV 91.3 80.0 - 100.0 fL    MCH 29.5 26.0 - 34.0 pg    MCHC 32.3 31.0 - 36.0 g/dL    RDW 15.6 (H) 12.4 - 15.4 %    Platelets 536 161 - 053 K/uL    MPV 8.5 5.0 - 10.5 fL       ASSESSMENT AND PLAN     Active Problems:    A-fib (HCC)    HTN (hypertension)    Hypoxemia    Congestive heart failure (HCC)    Hip fracture, right, closed, initial encounter ([facility]    Subcapital fracture of neck of right femur (HCC)    UTI (urinary tract infection)    Bradycardia    ASHD (arteriosclerotic heart disease)  Resolved Problems:    * No resolved hospital problems.  *  l   s/P hip pinning    PT OT eval and treat    DVT prophylaxis

## 2021-01-01 NOTE — PROGRESS NOTES
Pt back to bed with assistance from [facility]. No complications. Maximove utilized. Skin reassessed with assistance from [facility]. No complications, all skin is blanchable. All pressure areas evaluated. No suspected deep tissue injuries. Pt demonstrated how to reach nurse with call light. Call light in reach. Will continue to monitor.

## 2021-01-01 NOTE — PROGRESS NOTES
Hourly rounding performed on pt:  Pain: controlled, pt in chair and awake  Position: appears in no distress  Restroom: Pt clean and no need to void at this time  Proximity: call light, phone, bedside table in reach  Will continue to monitor

## 2021-01-01 NOTE — PROGRESS NOTES
Physical Therapy  Facility/Department: [address] ORTHO/NEURO NURSING  Daily Treatment Note  NAME: Mindy Rincon  : 1925  MRN: [id number]    Date of Service: 2021    Discharge Recommendations:  Mindy Rincon scored a 6/24 on the AM-PAC short mobility form. Current research shows that an AM-PAC score of 17 or less is typically not associated with a discharge to the patient's home setting. Based on the patient's AM-PAC score and their current functional mobility deficits, it is recommended that the patient have 3-5 sessions per week of Physical Therapy at d/c to increase the patient's independence. Please see assessment section for further patient specific details. If patient discharges prior to next session this note will serve as a discharge summary. Please see below for the latest assessment towards goals. 3-5 sessions per week   PT Equipment Recommendations  Equipment Needed: No    Assessment   Body structures, Functions, Activity limitations: Decreased functional mobility ; Decreased ADL status; Decreased ROM; Decreased strength;Decreased endurance;Decreased balance;Decreased safe awareness  Assessment: Pt presents as below her baseline function and would benefit from skilled PT services to promote safe return to PLOF. Continues to need 2-person assist for all mobility  Treatment Diagnosis: decreased functional mobility  Prognosis: Fair  PT Education: Goals;PT Role;Plan of Care;Weight-bearing Education  Patient Education: d/c recommendations - unable to verbalize understanding due [facility] and cognition  Barriers to Learning: cognitive  REQUIRES PT FOLLOW UP: Yes  Activity Tolerance  Activity Tolerance: Patient limited by cognitive status     Patient Diagnosis(es): The primary encounter diagnosis was Closed subcapital fracture of right femur, initial encounter ([facility]  Diagnoses of Unwitnessed fall, Dementia without behavioral disturbance, unspecified dementia type ([facility] and Hypoxia were also pertinent to this visit. has a past medical history of ASHD (arteriosclerotic heart disease), Atrial fibrillation ([facility] Cancer ([place] [facility] CHF (congestive heart failure) ([facility] Hyperlipidemia, Hypertension, Seasonal allergies, and Thyroid disease. has a past surgical history that includes eye surgery; Hysterectomy; hip surgery (Left, 7-26-14); and Hip fracture surgery (Right, 12/30/2020). Restrictions  Restrictions/Precautions  Restrictions/Precautions: Weight Bearing, Fall Risk  Required Braces or Orthoses?: No  Lower Extremity Weight Bearing Restrictions  Right Lower Extremity Weight Bearing: Partial Weight Bearing  Partial Weight Bearing Percentage Or Pounds: 25% PWB  Position Activity Restriction  Other position/activity restrictions: Darion Ceballos is a 80 y.o. female  who presents to the ED complaining of unwitnessed fall, being found on the ground between the door and bed. Unclear how long she may have been down, she is notably demented without ability to provide any meaningful history herself. She does not even tell me where she hurts or what her name is or what is going on. She is DNR-CC. She is hypoxic without known baseline oxygen requirement on arrival ant comfortable on NC oxygen. No known recent acute illnesses. She has a mild cough on my exam.  She seems tender in the R femur when palpated but doesn't respond to questions even though she makes eye contact when asking her questions. In previous documentation as well as me seeing her last summer, I recall that she has essentially severe dementia and unable to provide much helpful history herself. Sustained R hip fracture, s/p RIGHT hip percutaneous pinning  Subjective   General  Chart Reviewed: Yes  Family / Caregiver Present: No  Subjective  Subjective: Difficult to communicate as patient very [address]. Confusion noted - stating several times she didn't want to go to the party. Agreeable to therapy.  Denied pain - no outward signs of pain noted with mobility. Orientation  Orientation  Orientation Level: Oriented to person;Disoriented to time;Disoriented to situation;Disoriented to place(difficult to assess due to [facility])  Cognition      Objective   Bed mobility  Supine to Sit: 2 Person assistance;Dependent/Total  Scooting: Dependent/Total;2 Person assistance  Transfers  Sit to Stand: Dependent/Total;2 Person Assistance(max A of 2 for 3 partial stands from chair for jennie-care)  Stand to sit: Dependent/Total;2 Person Assistance  Stand Pivot Transfers: Dependent/Total;2 Person Assistance(max A of 2 to L)        Balance  Sitting - Static: Good(SBA seated EOB 5-6 mintues)  Comments: Noted to be incontinent of urine following transfer despite purewick. Jennie-care and brief change provided           AM-PAC Score  AM-PAC Inpatient Mobility Raw Score : 6 (01/01/21 0931)  AM-PAC Inpatient T-Scale Score : 23.55 (01/01/21 0931)  Mobility Inpatient CMS 0-100% Score: 100 (01/01/21 0931)  Mobility Inpatient CMS G-Code Modifier : CN (01/01/21 0931)          Goals  Short term goals  Time Frame for Short term goals: To be met prior to discharge  Short term goal 1: Pt will complete bed mobility with mod A  Short term goal 2: Pt will complete sit to/from stand with max A  Short term goal 3: Pt will complete stand pivot transfer with max A  No goals met this treatment. Plan    Plan  Times per week: 7x  Times per day: Daily  Current Treatment Recommendations: Strengthening, ROM, Balance Training, Functional Mobility Training, Transfer Training, Endurance Training, Gait Training, Safety Education & Training, Patient/Caregiver Education & Training  Safety Devices  Type of devices:  All fall risk precautions in place, Chair alarm in place, Call light within reach, Left in chair, Nurse notified(recommend maxi-move back to bed - discussed with nursing, Frances Land)  Restraints  Initially in place: No     Therapy Time   Individual Concurrent Group Co-treatment   Time In 4075 Time Out 0828         Minutes 40         Timed Code Treatment Minutes: [address] [facility] [address]       Veliz Drop, PT   Thanks, Jose Alfredo Junior, PT, DPT [id number]

## 2021-01-01 NOTE — PROGRESS NOTES
Department of Internal Medicine  General Internal Medicine   Progress Note      SUBJECTIVE: appears comfortable denies SOB or chest pain       History obtained from chart review, the patient and nursing staff  General ROS: positive for  - fatigue, malaise, sleep disturbance and weight loss  negative for - chills, fever or night sweats  Psychological ROS: positive for - anxiety, disorientation, irritability and memory difficulties  negative for - behavioral disorder, hallucinations or hostility  Ophthalmic ROS: negative  Respiratory ROS: no cough, shortness of breath, or wheezing  Cardiovascular ROS: positive for - dyspnea on exertion  negative for - chest pain  Gastrointestinal ROS: no abdominal pain, change in bowel habits, or black or bloody stools  Genito-Urinary ROS: incontinent   Musculoskeletal ROS: right hip pain unable to ambulate   Neurological ROS: no TIA or stroke symptoms  Dermatological ROS: negative    OBJECTIVE      Medications      Current Facility-Administered Medications: sodium chloride flush 0.9 % injection 10 mL, 10 mL, Intravenous, 2 times per day  sodium chloride flush 0.9 % injection 10 mL, 10 mL, Intravenous, PRN  sennosides-docusate sodium (SENOKOT-S) 8.6-50 MG tablet 1 tablet, 1 tablet, Oral, BID  magnesium hydroxide (MILK OF MAGNESIA) 400 MG/5ML suspension 30 mL, 30 mL, Oral, Daily PRN  0.9 % sodium chloride infusion, , Intravenous, Continuous  promethazine (PHENERGAN) tablet 12.5 mg, 12.5 mg, Oral, Q6H PRN **OR** ondansetron (ZOFRAN) injection 4 mg, 4 mg, Intravenous, Q6H PRN  enoxaparin (LOVENOX) injection 40 mg, 40 mg, Subcutaneous, Nightly  traMADol (ULTRAM) tablet 50 mg, 50 mg, Oral, Q6H PRN **OR** traMADol (ULTRAM) tablet 100 mg, 100 mg, Oral, Q6H PRN  morphine (PF) injection 2 mg, 2 mg, Intravenous, Q2H PRN **OR** morphine injection 4 mg, 4 mg, Intravenous, Q2H PRN  acetaminophen (TYLENOL) tablet 650 mg, 650 mg, Oral, Q4H PRN  lactated ringers infusion, , Intravenous, Continuous  sodium chloride flush 0.9 % injection 10 mL, 10 mL, Intravenous, 2 times per day  sodium chloride flush 0.9 % injection 10 mL, 10 mL, Intravenous, PRN    Physical      Vitals: BP (!) 152/74   Pulse 61   Temp 97.8 °F (36.6 °C) (Oral)   Resp 16   Ht 5' 2\" (1.575 m)   Wt 104 lb (47.2 kg)   SpO2 97%   BMI 19.02 kg/m²   Temp: Temp: 97.8 °F (36.6 °C)  Max: Temp  Av.9 °F (36.6 °C)  Min: 97.6 °F (36.4 °C)  Max: 98.3 °F (36.8 °C)  Respiration range:  Resp  Av  Min: 16  Max: 16  Pulse Range:  Pulse  Av.8  Min: 61  Max: 70  Blood pressure range:  Systolic (05EIU), HEZ:928 , Min:132 , ZDU:375   , Diastolic (53PGY), TOV:96, Min:63, Max:78    SpO2  Av %  Min: 92 %  Max: 99 %    Intake/Output Summary (Last 24 hours) at 2021 0242  Last data filed at 2020 0902  Gross per 24 hour   Intake 604 ml   Output --   Net 604 ml       Vent settings:  Pulse  Av.6  Min: 59  Max: 95  Resp  Av.7  Min: 1  Max: 29  SpO2  Av.9 %  Min: 83 %  Max: 100 %    CONSTITUTIONAL:  fatigued, somnolent, uncooperative, distracted, mild distress, appears stated age and thin  EYES:  Unremarkable   NECK:  No JVD  and supple, symmetrical, trachea midline  BACK:  Kyphotic  and symmetric  LUNGS:  Poor inspiration , vesicular  No wheezes   CARDIOVASCULAR:  RRR S1 S2, normal apical pulses, regular rate and rhythm and normal S1 and S2  ABDOMEN:  Soft scaphoid BS + non tender   MUSCULOSKELETAL:  Tenderness right hip restricted ROM right hip post op site looks free from any complications   NEUROLOGIC:  General weakness poor co-ordination   SKIN:  Warm dry and pale  and no bruising or bleeding    Data      Recent Results (from the past 96 hour(s))   EKG 12 Lead    Collection Time: 20 11:48 PM   Result Value Ref Range    Ventricular Rate 55 BPM    Atrial Rate 55 BPM    P-R Interval 196 ms    QRS Duration 118 ms    Q-T Interval 490 ms    QTc Calculation (Bazett) 468 ms    R Axis -54 degrees    T Axis 116 degrees    Diagnosis       Sinus bradycardiaLeft axis deviationInferior infarct , age undeterminedAnteroseptal infarct , age undeterminedST & T wave abnormality, consider lateral ischemiaAbnormal ECG   POCT Glucose    Collection Time: 12/28/20 11:51 PM   Result Value Ref Range    POC Glucose 115 (H) 70 - 99 mg/dl    Performed on ACCU-CHEK    POCT glucose    Collection Time: 12/28/20 11:54 PM   Result Value Ref Range    Glucose 115 mg/dL    QC OK?  yes    CBC auto differential    Collection Time: 12/29/20 12:34 AM   Result Value Ref Range    WBC 4.7 4.0 - 11.0 K/uL    RBC 4.42 4.00 - 5.20 M/uL    Hemoglobin 13.0 12.0 - 16.0 g/dL    Hematocrit 40.1 36.0 - 48.0 %    MCV 90.6 80.0 - 100.0 fL    MCH 29.3 26.0 - 34.0 pg    MCHC 32.3 31.0 - 36.0 g/dL    RDW 16.1 (H) 12.4 - 15.4 %    Platelets 018 682 - 915 K/uL    MPV 7.8 5.0 - 10.5 fL    Neutrophils % 73.7 %    Lymphocytes % 17.2 %    Monocytes % 3.7 %    Eosinophils % 3.1 %    Basophils % 2.3 %    Neutrophils Absolute 3.4 1.7 - 7.7 K/uL    Lymphocytes Absolute 0.8 (L) 1.0 - 5.1 K/uL    Monocytes Absolute 0.2 0.0 - 1.3 K/uL    Eosinophils Absolute 0.1 0.0 - 0.6 K/uL    Basophils Absolute 0.1 0.0 - 0.2 K/uL   Comprehensive Metabolic Panel w/ Reflex to MG    Collection Time: 12/29/20 12:34 AM   Result Value Ref Range    Sodium 140 136 - 145 mmol/L    Potassium reflex Magnesium 4.9 3.5 - 5.1 mmol/L    Chloride 100 99 - 110 mmol/L    CO2 32 21 - 32 mmol/L    Anion Gap 8 3 - 16    Glucose 121 (H) 70 - 99 mg/dL    BUN 14 7 - 20 mg/dL    CREATININE 0.7 0.6 - 1.2 mg/dL    GFR Non-African American >60 >60    GFR African American >60 >60    Calcium 9.2 8.3 - 10.6 mg/dL    Total Protein 7.1 6.4 - 8.2 g/dL    Alb 3.8 3.4 - 5.0 g/dL    Albumin/Globulin Ratio 1.2 1.1 - 2.2    Total Bilirubin 0.5 0.0 - 1.0 mg/dL    Alkaline Phosphatase 73 40 - 129 U/L    ALT 15 10 - 40 U/L    AST 23 15 - 37 U/L    Globulin 3.3 g/dL   CK    Collection Time: 12/29/20 12:34 AM   Result Value Ref Range Total CK 71 26 - 192 U/L   Troponin    Collection Time: 12/29/20 12:34 AM   Result Value Ref Range    Troponin <0.01 <0.01 ng/mL   Brain Natriuretic Peptide    Collection Time: 12/29/20 12:34 AM   Result Value Ref Range    Pro- (H) 0 - 449 pg/mL   Protime-INR    Collection Time: 12/29/20 12:34 AM   Result Value Ref Range    Protime 10.6 10.0 - 13.2 sec    INR 0.91 0.86 - 1.14   COVID-19    Collection Time: 12/29/20 12:34 AM   Result Value Ref Range    SARS-CoV-2 Not Detected Not detected   Urinalysis Reflex to Culture    Collection Time: 12/29/20  7:07 AM    Specimen: Urine, clean catch   Result Value Ref Range    Color, UA YELLOW Straw/Yellow    Clarity, UA TURBID (A) Clear    Glucose, Ur Negative Negative mg/dL    Bilirubin Urine Negative Negative    Ketones, Urine Negative Negative mg/dL    Specific Gravity, UA 1.017 1.005 - 1.030    Blood, Urine Negative Negative    pH, UA 7.5 5.0 - 8.0    Protein, UA Negative Negative mg/dL    Urobilinogen, Urine 1.0 <2.0 E.U./dL    Nitrite, Urine POSITIVE (A) Negative    Leukocyte Esterase, Urine Negative Negative    Microscopic Examination YES     Urine Type NotGiven     Urine Reflex to Culture Not Indicated    Microscopic Urinalysis    Collection Time: 12/29/20  7:07 AM   Result Value Ref Range    Bacteria, UA 4+ (A) None Seen /HPF    Hyaline Casts, UA 0 0 - 8 /LPF    WBC, UA 1 0 - 5 /HPF    RBC, UA 1 0 - 4 /HPF    Epithelial Cells, UA 0 0 - 5 /HPF   Basic Metabolic Panel    Collection Time: 12/31/20  7:33 AM   Result Value Ref Range    Sodium 137 136 - 145 mmol/L    Potassium 3.9 3.5 - 5.1 mmol/L    Chloride 103 99 - 110 mmol/L    CO2 28 21 - 32 mmol/L    Anion Gap 6 3 - 16    Glucose 96 70 - 99 mg/dL    BUN 12 7 - 20 mg/dL    CREATININE 0.5 (L) 0.6 - 1.2 mg/dL    GFR Non-African American >60 >60    GFR African American >60 >60    Calcium 8.6 8.3 - 10.6 mg/dL   CBC    Collection Time: 12/31/20  7:33 AM   Result Value Ref Range    WBC 5.3 4.0 - 11.0 K/uL    RBC 3.40

## 2021-01-02 LAB
HCT VFR BLD CALC: 37.3 % (ref 36–48)
HEMOGLOBIN: 12 G/DL (ref 12–16)
MCH RBC QN AUTO: 29.1 PG (ref 26–34)
MCHC RBC AUTO-ENTMCNC: 32.1 G/DL (ref 31–36)
MCV RBC AUTO: 90.7 FL (ref 80–100)
PDW BLD-RTO: 15.8 % (ref 12.4–15.4)
PLATELET # BLD: 194 K/UL (ref 135–450)
PMV BLD AUTO: 8.4 FL (ref 5–10.5)
RBC # BLD: 4.11 M/UL (ref 4–5.2)
WBC # BLD: 4.3 K/UL (ref 4–11)

## 2021-01-02 PROCEDURE — 97530 THERAPEUTIC ACTIVITIES: CPT

## 2021-01-02 PROCEDURE — 85027 COMPLETE CBC AUTOMATED: CPT

## 2021-01-02 PROCEDURE — 1200000000 HC SEMI PRIVATE

## 2021-01-02 PROCEDURE — 6370000000 HC RX 637 (ALT 250 FOR IP): Performed by: ORTHOPAEDIC SURGERY

## 2021-01-02 PROCEDURE — 2580000003 HC RX 258: Performed by: ANESTHESIOLOGY

## 2021-01-02 PROCEDURE — 36415 COLL VENOUS BLD VENIPUNCTURE: CPT

## 2021-01-02 PROCEDURE — 6370000000 HC RX 637 (ALT 250 FOR IP): Performed by: INTERNAL MEDICINE

## 2021-01-02 PROCEDURE — 2700000000 HC OXYGEN THERAPY PER DAY

## 2021-01-02 PROCEDURE — 97535 SELF CARE MNGMENT TRAINING: CPT

## 2021-01-02 PROCEDURE — 6360000002 HC RX W HCPCS: Performed by: ORTHOPAEDIC SURGERY

## 2021-01-02 PROCEDURE — 94760 N-INVAS EAR/PLS OXIMETRY 1: CPT

## 2021-01-02 RX ORDER — AMLODIPINE BESYLATE 5 MG/1
5 TABLET ORAL DAILY
Status: DISCONTINUED | OUTPATIENT
Start: 2021-01-02 | End: 2021-01-04 | Stop reason: HOSPADM

## 2021-01-02 RX ADMIN — STANDARDIZED SENNA CONCENTRATE AND DOCUSATE SODIUM 1 TABLET: 8.6; 5 TABLET ORAL at 20:42

## 2021-01-02 RX ADMIN — AMLODIPINE BESYLATE 5 MG: 5 TABLET ORAL at 18:55

## 2021-01-02 RX ADMIN — Medication 10 ML: at 10:14

## 2021-01-02 RX ADMIN — Medication 10 ML: at 20:43

## 2021-01-02 RX ADMIN — STANDARDIZED SENNA CONCENTRATE AND DOCUSATE SODIUM 1 TABLET: 8.6; 5 TABLET ORAL at 10:14

## 2021-01-02 RX ADMIN — ENOXAPARIN SODIUM 40 MG: 40 INJECTION SUBCUTANEOUS at 20:42

## 2021-01-02 ASSESSMENT — PAIN SCALES - PAIN ASSESSMENT IN ADVANCED DEMENTIA (PAINAD)
CONSOLABILITY: 0
FACIALEXPRESSION: 0
CONSOLABILITY: 0
CONSOLABILITY: 0
NEGVOCALIZATION: 0
CONSOLABILITY: 0
TOTALSCORE: 0
BREATHING: 0
BODYLANGUAGE: 0
NEGVOCALIZATION: 0
NEGVOCALIZATION: 0
BODYLANGUAGE: 0
TOTALSCORE: 0
FACIALEXPRESSION: 0
TOTALSCORE: 0
TOTALSCORE: 0
BREATHING: 0
FACIALEXPRESSION: 0
FACIALEXPRESSION: 0
BODYLANGUAGE: 0
NEGVOCALIZATION: 0
BREATHING: 0
CONSOLABILITY: 0
BREATHING: 0
BODYLANGUAGE: 0
TOTALSCORE: 0
NEGVOCALIZATION: 0

## 2021-01-02 ASSESSMENT — PAIN SCALES - WONG BAKER
WONGBAKER_NUMERICALRESPONSE: 0

## 2021-01-02 NOTE — PROGRESS NOTES
2200 Patient assessment complete. Took medications without difficulties. Denies any needs at this time. Care plan and education reviewed and agreed upon with patient. Bed in low position, call light within reach and bed alarm on. Will continue to monitor.

## 2021-01-02 NOTE — PROGRESS NOTES
Occupational Therapy  Facility/Department: [address] ORTHO/NEURO NURSING  Daily Treatment Note  NAME: Fidelina Braden  : 1925  MRN: [id number]    Date of Service: 2021    Discharge Recommendations:  Fidelina Braden scored a 10/24 on the AM-PAC ADL Inpatient form. Current research shows that an AM-PAC score of 17 or less is typically not associated with a discharge to the patient's home setting. Based on the patient's AM-PAC score and their current ADL deficits, it is recommended that the patient have 3-5 sessions per week of Occupational Therapy at d/c to increase the patient's independence. Please see assessment section for further patient specific details. If patient discharges prior to next session this note will serve as a discharge summary. Please see below for the latest assessment towards goals. OT Equipment Recommendations  Equipment Needed: No    Assessment   Performance deficits / Impairments: Decreased functional mobility ; Decreased ADL status; Decreased cognition;Decreased endurance;Decreased balance;Decreased safe awareness  Assessment: pt continues to be total assist of 2 for bed mobility, sitting balance varies from CGA to maxA. Treatment Diagnosis: Decreased ADL status, functional mobility, endurance, cognition, safety awareness and balance associated with R hip fx, s/p pinning  Prognosis: Fair  History: Pt 81 yo, lives in [place], assist for ADLs, nonambulationy. PMH: CHF, dementia, A-fib, breast Ca  Assistance / Modification: assist of 2, maxA ADLs  OT Education: Orientation  Patient Education: Needs constant reinforcement. Barriers to Learning: Hearing, cognition, vision  REQUIRES OT FOLLOW UP: Yes  Activity Tolerance  Activity Tolerance: Patient Tolerated treatment well;Treatment limited secondary to decreased cognition  Safety Devices  Safety Devices in place: Yes  Type of devices:  All fall risk precautions in place;Nurse notified;Call light within reach;Gait belt;Patient at risk for falls; Bed alarm in place; Left in bed         Patient Diagnosis(es): The primary encounter diagnosis was Closed subcapital fracture of right femur, initial encounter ([facility] Diagnoses of Unwitnessed fall, Dementia without behavioral disturbance, unspecified dementia type ([facility] Hypoxia, and Hip fracture, right, closed, initial encounter [facility]) were also pertinent to this visit. has a past medical history of ASHD (arteriosclerotic heart disease), Atrial fibrillation ([facility] CHF (congestive heart failure) ([facility] Hyperlipidemia, Hypertension, Seasonal allergies, and Thyroid disease. has a past surgical history that includes eye surgery; Hysterectomy; hip surgery (Left, 7-26-14); and Hip fracture surgery (Right, 12/30/2020). Restrictions  Restrictions/Precautions  Restrictions/Precautions: Weight Bearing, Fall Risk  Required Braces or Orthoses?: No  Lower Extremity Weight Bearing Restrictions  Right Lower Extremity Weight Bearing: Partial Weight Bearing  Partial Weight Bearing Percentage Or Pounds: 25% PWB  Position Activity Restriction  Other position/activity restrictions: Daphnie Granger is a 80 y.o. female  who presents to the ED complaining of unwitnessed fall, being found on the ground between the door and bed. Unclear how long she may have been down, she is notably demented without ability to provide any meaningful history herself. She does not even tell me where she hurts or what her name is or what is going on. She is DNR-CC. She is hypoxic without known baseline oxygen requirement on arrival ant comfortable on [place] oxygen. No known recent acute illnesses. She has a mild cough on my exam.  She seems tender in the R femur when palpated but doesn't respond to questions even though she makes eye contact when asking her questions.   In previous documentation as well as me seeing her last summer, I recall that she has essentially severe dementia and unable to provide much helpful history herself. Sustained R hip fracture, s/p RIGHT hip percutaneous pinning  Subjective   General  Chart Reviewed: Yes  Additional Pertinent Hx: pt extremely hard of hearing  Response to previous treatment: Patient unable to report, no changes reported from family or staff  Family / Caregiver Present: No  Referring Practitioner: Gume Roque MD, for d/c planning  Diagnosis: R hip fx, s/p pinning  Subjective  Subjective: pt in bed upon arrival, extremely hard of hearing, agreeable to OT cotx. oriented to self only  General Comment  Comments: Pt with severe hearing loss, even with hearing aids. Pain Assessment  Pain Assessment: Advanced Dementia   Orientation  Orientation  Overall Orientation Status: Impaired  Orientation Level: Oriented to person;Disoriented to time;Disoriented to situation;Disoriented to place  Objective    ADL  UE Bathing: Maximum assistance  UE Dressing: Maximum assistance  Toileting: (purewick present)        Balance  Sitting Balance: (varies from CGA to max)  Bed mobility  Supine to Sit: 2 Person assistance;Dependent/Total  Scooting: Dependent/Total;2 Person assistance  Comment: total assist of 2, pt seated EOB ~5-6 minutes, completed bathing and gown changed EOB. sitting varied from CGA to maxA  Transfers  Transfer Comments: did not transfer this date, pt requesting to return to bed                       Cognition  Overall Cognitive Status: Exceptions  Arousal/Alertness: Appropriate responses to stimuli  Following Commands: Inconsistently follows commands; Follows one step commands with increased time; Follows one step commands with repetition  Memory: Decreased recall of recent events;Decreased short term memory  Insights: Not aware of deficits  Initiation: Requires cues for all  Sequencing: Requires cues for all     Perception  Overall Perceptual Status: Impaired  Initiation: Cues to initiate tasks                                   Plan   Plan  Times per week: downgrade POC: 3-5  Times per day: Daily  Plan weeks: cotx  Current Treatment Recommendations: Safety Education & Training, Self-Care / ADL, Endurance Training, Functional Mobility Training, Positioning                AM-PAC Score        AM-PAC Inpatient Daily Activity Raw Score: 10 (01/02/21 1221)  AM-PAC Inpatient ADL T-Scale Score : 27.31 (01/02/21 1221)  ADL Inpatient CMS 0-100% Score: 74.7 (01/02/21 1221)  ADL Inpatient CMS G-Code Modifier : CL (01/02/21 1221)    Goals  Short term goals  Time Frame for Short term goals: Discharge  Short term goal 1: Mod A for bed mobility--D of 2 supine to sit 1/2  Short term goal 2: Mod A for functional transfers for ADL activity, maintaining 25% WB--did not assess this date  Short term goal 3: Min A for UB bathing--maxA 1/2  Short term goal 4: Min A for grooming--maxA 1/2  Short term goal 5: Pt to tolerate standing 2 min for standing ADL activity w/Mod A for standing balance & maintaining 25% WB--- discharge goal 1/2  Long term goals  Long term goal 1: NEW GOAL: sitting balance EOB CGA for ADL task       Therapy Time   Individual Concurrent Group Co-treatment   Time In       1130   Time Out       1153   Minutes       23     Timed Code Treatment Minutes:  23 Minutes    Total Treatment Minutes:  23 minutes      Nate Burch OTR/L [id number]      Nate Burch OT

## 2021-01-02 NOTE — PROGRESS NOTES
10:28 AM  AM assessment complete. VSS. MARYAM orientation, [place] and hearing aid batteries not working. Breath sounds diminished bilaterally. Bowel sounds active x4. R hip dressing clean dry intact. Pedal pulses palpable, MARYAM reflexes d/t [place]. Per advanced dementia scale, pain 0/10. Pills given crushed in applesauce, tolerated well. Call light within reach. 2:20 PM  Nurse alerted by PCA for elevated BP. Manual recheck 158/78. Pt showing no s/s of distress or pain. 6:18 PM  Message sent to Dr. Dexter Patricia about elevated BP. Awaiting response.

## 2021-01-02 NOTE — PROGRESS NOTES
Department of Internal Medicine  General Internal Medicine   Progress Note      SUBJECTIVE:  Denies specific complaints , pain is under control       History obtained from chart review, the patient and nursing staff  General ROS: positive for  - fatigue, malaise, sleep disturbance and weight loss  negative for - chills, fever or night sweats  Psychological ROS: positive for - anxiety, disorientation, irritability and memory difficulties  negative for - behavioral disorder, hallucinations or hostility  Ophthalmic ROS: negative  Respiratory ROS: no cough, shortness of breath, or wheezing  Cardiovascular ROS: positive for - dyspnea on exertion  negative for - chest pain  Gastrointestinal ROS: no abdominal pain, change in bowel habits, or black or bloody stools  Genito-Urinary ROS: incontinent   Musculoskeletal ROS: right hip pain unable to ambulate   Neurological ROS: no TIA or stroke symptoms  Dermatological ROS: negative    OBJECTIVE      Medications      Current Facility-Administered Medications: sodium chloride flush 0.9 % injection 10 mL, 10 mL, Intravenous, 2 times per day  sodium chloride flush 0.9 % injection 10 mL, 10 mL, Intravenous, PRN  sennosides-docusate sodium (SENOKOT-S) 8.6-50 MG tablet 1 tablet, 1 tablet, Oral, BID  magnesium hydroxide (MILK OF MAGNESIA) 400 MG/5ML suspension 30 mL, 30 mL, Oral, Daily PRN  promethazine (PHENERGAN) tablet 12.5 mg, 12.5 mg, Oral, Q6H PRN **OR** ondansetron (ZOFRAN) injection 4 mg, 4 mg, Intravenous, Q6H PRN  enoxaparin (LOVENOX) injection 40 mg, 40 mg, Subcutaneous, Nightly  traMADol (ULTRAM) tablet 50 mg, 50 mg, Oral, Q6H PRN **OR** traMADol (ULTRAM) tablet 100 mg, 100 mg, Oral, Q6H PRN  morphine (PF) injection 2 mg, 2 mg, Intravenous, Q2H PRN **OR** morphine injection 4 mg, 4 mg, Intravenous, Q2H PRN  acetaminophen (TYLENOL) tablet 650 mg, 650 mg, Oral, Q4H PRN  sodium chloride flush 0.9 % injection 10 mL, 10 mL, Intravenous, 2 times per day  sodium chloride flush 0.9 % injection 10 mL, 10 mL, Intravenous, PRN    Physical      Vitals: /79   Pulse 61   Temp 98.3 °F (36.8 °C) (Oral)   Resp 14   Ht 5' 2\" (1.575 m)   Wt 104 lb (47.2 kg)   SpO2 96%   BMI 19.02 kg/m²   Temp: Temp: 98.3 °F (36.8 °C)  Max: Temp  Av.1 °F (36.7 °C)  Min: 97.8 °F (36.6 °C)  Max: 98.3 °F (36.8 °C)  Respiration range:  Resp  Av  Min: 14  Max: 14  Pulse Range:  Pulse  Av  Min: 55  Max: 82  Blood pressure range:  Systolic (23IUC), WAF:602 , Min:137 , TUP:606   , Diastolic (53ZWO), LZF:80, Min:68, Max:79    SpO2  Av %  Min: 95 %  Max: 97 %    Intake/Output Summary (Last 24 hours) at 2021 0902  Last data filed at 2021 0545  Gross per 24 hour   Intake 900 ml   Output 1000 ml   Net -100 ml       Vent settings:  Pulse  Av.8  Min: 55  Max: 95  Resp  Av.6  Min: 1  Max: 29  SpO2  Av.9 %  Min: 83 %  Max: 100 %    CONSTITUTIONAL:  fatigued, somnolent, uncooperative, distracted, mild distress, appears stated age and thin  EYES:  Unremarkable   NECK:  No JVD  and supple, symmetrical, trachea midline  BACK:  Kyphotic  and symmetric  LUNGS:  Poor inspiration , vesicular  No wheezes   CARDIOVASCULAR:  RRR S1 S2, normal apical pulses, regular rate and rhythm and normal S1 and S2  ABDOMEN:  Soft scaphoid BS + non tender   MUSCULOSKELETAL:  Tenderness right hip restricted ROM right hip post op site looks free from any complications   NEUROLOGIC:  General weakness poor co-ordination   SKIN:  Warm dry and pale  and no bruising or bleeding    Data      Recent Results (from the past 96 hour(s))   Basic Metabolic Panel    Collection Time: 20  7:33 AM   Result Value Ref Range    Sodium 137 136 - 145 mmol/L    Potassium 3.9 3.5 - 5.1 mmol/L    Chloride 103 99 - 110 mmol/L    CO2 28 21 - 32 mmol/L    Anion Gap 6 3 - 16    Glucose 96 70 - 99 mg/dL    BUN 12 7 - 20 mg/dL    CREATININE 0.5 (L) 0.6 - 1.2 mg/dL    GFR Non-African American >60 >60    GFR  >60 >60 Calcium 8.6 8.3 - 10.6 mg/dL   CBC    Collection Time: 12/31/20  7:33 AM   Result Value Ref Range    WBC 5.3 4.0 - 11.0 K/uL    RBC 3.40 (L) 4.00 - 5.20 M/uL    Hemoglobin 10.0 (L) 12.0 - 16.0 g/dL    Hematocrit 31.1 (L) 36.0 - 48.0 %    MCV 91.3 80.0 - 100.0 fL    MCH 29.5 26.0 - 34.0 pg    MCHC 32.3 31.0 - 36.0 g/dL    RDW 15.6 (H) 12.4 - 15.4 %    Platelets 121 213 - 563 K/uL    MPV 8.5 5.0 - 10.5 fL   CBC    Collection Time: 01/01/21  7:30 AM   Result Value Ref Range    WBC 5.5 4.0 - 11.0 K/uL    RBC 3.88 (L) 4.00 - 5.20 M/uL    Hemoglobin 11.5 (L) 12.0 - 16.0 g/dL    Hematocrit 35.3 (L) 36.0 - 48.0 %    MCV 91.0 80.0 - 100.0 fL    MCH 29.6 26.0 - 34.0 pg    MCHC 32.6 31.0 - 36.0 g/dL    RDW 16.0 (H) 12.4 - 15.4 %    Platelets 125 452 - 148 K/uL    MPV 7.7 5.0 - 10.5 fL   CBC    Collection Time: 01/02/21  6:59 AM   Result Value Ref Range    WBC 4.3 4.0 - 11.0 K/uL    RBC 4.11 4.00 - 5.20 M/uL    Hemoglobin 12.0 12.0 - 16.0 g/dL    Hematocrit 37.3 36.0 - 48.0 %    MCV 90.7 80.0 - 100.0 fL    MCH 29.1 26.0 - 34.0 pg    MCHC 32.1 31.0 - 36.0 g/dL    RDW 15.8 (H) 12.4 - 15.4 %    Platelets 711 051 - 731 K/uL    MPV 8.4 5.0 - 10.5 fL       ASSESSMENT AND PLAN     Active Problems:    A-fib (HCC)    HTN (hypertension)    Hypoxemia    Congestive heart failure (HCC)    Hip fracture, right, closed, initial encounter ([facility])    Subcapital fracture of neck of right femur ([facility])    UTI (urinary tract infection)    Bradycardia    ASHD (arteriosclerotic heart disease)  Resolved Problems:    * No resolved hospital problems.  *    Patient is dismissal ready    H/H stable    PT OT

## 2021-01-02 NOTE — PLAN OF CARE
Problem: Falls - Risk of:  Goal: Will remain free from falls  Description: Will remain free from falls  1/2/2021 0709 by Whitney Barker RN  Outcome: Ongoing  1/1/2021 1951 by Javan Jim  Outcome: Ongoing  Goal: Absence of physical injury  Description: Absence of physical injury  1/2/2021 0709 by Whitney Barker RN  Outcome: Ongoing  1/1/2021 1951 by Javan Jim  Outcome: Ongoing     Problem: Skin Integrity:  Goal: Will show no infection signs and symptoms  Description: Will show no infection signs and symptoms  1/2/2021 0709 by Whitney Barker RN  Outcome: Ongoing  1/1/2021 1951 by Javan Jim  Outcome: Ongoing  Goal: Absence of new skin breakdown  Description: Absence of new skin breakdown  1/2/2021 0709 by Whitney Barker RN  Outcome: Ongoing  1/1/2021 1951 by Javan Jim  Outcome: Ongoing     Problem: Pain:  Goal: Pain level will decrease  Description: Pain level will decrease  1/2/2021 0709 by Whitney Barker RN  Outcome: Ongoing  1/1/2021 1951 by Javan Jim  Outcome: Ongoing  Goal: Control of acute pain  Description: Control of acute pain  1/2/2021 0709 by Whitney Barker RN  Outcome: Ongoing  1/1/2021 1951 by Javan Jim  Outcome: Ongoing  Goal: Control of chronic pain  Description: Control of chronic pain  1/2/2021 0709 by Whitney Barker RN  Outcome: Ongoing  1/1/2021 1951 by Javan Jim  Outcome: Ongoing     Problem:  Activity:  Goal: Ability to ambulate will improve  Description: Ability to ambulate will improve  1/2/2021 0709 by Whitney Barker RN  Outcome: Ongoing  1/1/2021 1951 by Javan Jim  Outcome: Ongoing  Goal: Ability to perform activities at highest level will improve  Description: Ability to perform activities at highest level will improve  1/2/2021 0709 by Whitney Barker RN  Outcome: Ongoing  1/1/2021 1951 by Javan Jim  Outcome: Ongoing     Problem: Physical Regulation:  Goal: Will remain free from infection  Description: Will remain free from infection  1/2/2021 0709 by Coral Michaels RN  Outcome: Ongoing  1/1/2021 1951 by Tiffany Gray  Outcome: Ongoing  Goal: Postoperative complications will be avoided or minimized  Description: Postoperative complications will be avoided or minimized  1/2/2021 0709 by Coral Michaels RN  Outcome: Ongoing  1/1/2021 1951 by Tiffany Gray  Outcome: Ongoing  Goal: Diagnostic test results will improve  Description: Diagnostic test results will improve  1/2/2021 0709 by Coral Michaels RN  Outcome: Ongoing  1/1/2021 1951 by Tiffany Gray  Outcome: Ongoing

## 2021-01-02 NOTE — CARE COORDINATION
Aware of pt's dc order for today. Left message for son to discuss dc planning. Electronically signed by JAIMIE Hazel on 1/2/2021 at 8:30 AM    ADDENDUM:  Spoke to son Rachell Esparza. He stated plan is for pt to return to [facility]. Stated that pt has not ambulated in a year or more-[place] [facility] [place] go well (pt did not participate w/therapy). Son stated he spoke to Elian Schwartz () at [facility] and they will be able to have 2 people to assist pt. Call placed to facility to confirm-Pastor will need to confirm that proper supports in place before pt returns (may need hospital bed on return). Message left for Elian Schwartz to call main office and discuss dc planning for pt. Likely dc back to [facility] w/home care, may need hospital bed, will need stretcher transport to return.   Electronically signed by JAIMIE Hazel on 1/2/2021 at 8:52 AM

## 2021-01-02 NOTE — PLAN OF CARE
Problem: Falls - Risk of:  Goal: Will remain free from falls  Description: Will remain free from falls  Outcome: Ongoing  Goal: Absence of physical injury  Description: Absence of physical injury  Outcome: Ongoing     Problem: Skin Integrity:  Goal: Will show no infection signs and symptoms  Description: Will show no infection signs and symptoms  Outcome: Ongoing  Goal: Absence of new skin breakdown  Description: Absence of new skin breakdown  Outcome: Ongoing     Problem: Pain:  Description: Pain management should include both nonpharmacologic and pharmacologic interventions. Goal: Pain level will decrease  Description: Pain level will decrease  Outcome: Ongoing  Goal: Control of acute pain  Description: Control of acute pain  Outcome: Ongoing  Goal: Control of chronic pain  Description: Control of chronic pain  Outcome: Ongoing     Problem:  Activity:  Goal: Ability to ambulate will improve  Description: Ability to ambulate will improve  Outcome: Ongoing  Goal: Ability to perform activities at highest level will improve  Description: Ability to perform activities at highest level will improve  Outcome: Ongoing     Problem: Physical Regulation:  Goal: Will remain free from infection  Description: Will remain free from infection  Outcome: Ongoing  Goal: Postoperative complications will be avoided or minimized  Description: Postoperative complications will be avoided or minimized  Outcome: Ongoing  Goal: Diagnostic test results will improve  Description: Diagnostic test results will improve  Outcome: Ongoing

## 2021-01-02 NOTE — PROGRESS NOTES
Physical Therapy  Facility/Department: [address] ORTHO/NEURO NURSING  Daily Treatment Note  NAME: Xiomara Pearson  : 1925  MRN: [id number]    Date of Service: 2021    Discharge Recommendations:  Xiomara Pearson scored a 6/24 on the AM-PAC short mobility form. Current research shows that an AM-PAC score of 17 or less is typically not associated with a discharge to the patient's home setting. Based on the patient's AM-PAC score and their current functional mobility deficits, it is recommended that the patient have 3-5 sessions per week of Physical Therapy at d/c to increase the patient's independence. Please see assessment section for further patient specific details. If patient discharges prior to next session this note will serve as a discharge summary. Please see below for the latest assessment towards goals. PT Equipment Recommendations  Equipment Needed: No    Assessment   Body structures, Functions, Activity limitations: Decreased functional mobility ; Decreased ADL status; Decreased ROM; Decreased strength;Decreased endurance;Decreased balance;Decreased safe awareness  Assessment: pt continues to require assist of 2 for safe mobility and demonstrate little participation in therapy, will plan to decrease POC secondary to limited pt participation and no progress towards functional goals  Treatment Diagnosis: decreased functional mobility  Prognosis: Fair  PT Education: Goals;PT Role;Plan of Care;Weight-bearing Education  Patient Education: d/c recommendations - unable to verbalize understanding due to [address] and cognition  Barriers to Learning: cognitive  REQUIRES PT FOLLOW UP: Yes  Activity Tolerance  Activity Tolerance: Patient limited by cognitive status     Patient Diagnosis(es): The primary encounter diagnosis was Closed subcapital fracture of right femur, initial encounter ([facility] [address]  Diagnoses of Unwitnessed fall, Dementia without behavioral disturbance, unspecified dementia type ([facility] [address] Hypoxia, and Hip fracture, right, closed, initial encounter [facility]) were also pertinent to this visit. has a past medical history of ASHD (arteriosclerotic heart disease), Atrial fibrillation ([facility] [address] Cancer ([facility] [address] CHF (congestive heart failure) ([facility] [address] Hyperlipidemia, Hypertension, Seasonal allergies, and Thyroid disease. has a past surgical history that includes eye surgery; Hysterectomy; hip surgery (Left, 7-26-14); and Hip fracture surgery (Right, 12/30/2020). Restrictions  Restrictions/Precautions  Restrictions/Precautions: Weight Bearing, Fall Risk  Required Braces or Orthoses?: No  Lower Extremity Weight Bearing Restrictions  Right Lower Extremity Weight Bearing: Partial Weight Bearing  Partial Weight Bearing Percentage Or Pounds: 25% PWB  Position Activity Restriction  Other position/activity restrictions: Cecilia Elliott is a 80 y.o. female  who presents to the ED complaining of unwitnessed fall, being found on the ground between the door and bed. Unclear how long she may have been down, she is notably demented without ability to provide any meaningful history herself. She does not even tell me where she hurts or what her name is or what is going on. She is DNR-CC. She is hypoxic without known baseline oxygen requirement on arrival ant comfortable on [place] oxygen. No known recent acute illnesses. She has a mild cough on my exam.  She seems tender in the R femur when palpated but doesn't respond to questions even though she makes eye contact when asking her questions. In previous documentation as well as me seeing her last summer, I recall that she has essentially severe dementia and unable to provide much helpful history herself. Sustained R hip fracture, s/p RIGHT hip percutaneous pinning  Subjective   General  Chart Reviewed: Yes  Response To Previous Treatment: Patient with no complaints from previous session.   Family / Caregiver Present: No  Subjective  Subjective: pt supine at start of session, alert but not interactive with therapy  Pain Screening  Patient Currently in Pain: Denies  Vital Signs  Patient Currently in Pain: Denies       Orientation  Orientation  Overall Orientation Status: Impaired  Orientation Level: Oriented to person;Disoriented to time;Disoriented to situation;Disoriented to place  Cognition      Objective   Bed mobility  Supine to Sit: 2 Person assistance;Dependent/Total  Sit to Supine: Dependent/Total;2 Person assistance  Scooting: Dependent/Total;2 Person assistance  Comment: dependent of 2 for bed mobility--no initiation noted from patient  Transfers  Comment: not addressed this session        Balance  Posture: Fair  Sitting - Static: Fair;-  Sitting - Dynamic: Poor  Comments: EOB balance x12 min for ADL tasks ranging from max assist to CGA for midline positioning            AM-PAC Score  AM-PAC Inpatient Mobility Raw Score : 6 (01/02/21 1250)  AM-PAC Inpatient T-Scale Score : 23.55 (01/02/21 1250)  Mobility Inpatient CMS 0-100% Score: 100 (01/02/21 1250)  Mobility Inpatient CMS G-Code Modifier : CN (01/02/21 1250)          Goals  Short term goals  Time Frame for Short term goals: To be met prior to discharge  Short term goal 1: Pt will complete bed mobility with mod A  Short term goal 2: Pt will complete sit to/from stand with max A  Short term goal 3: Pt will complete stand pivot transfer with max A  No goals met  Plan    Plan  Times per week: 3-5  Times per day: Daily  Current Treatment Recommendations: Strengthening, ROM, Balance Training, Functional Mobility Training, Transfer Training, Endurance Training, Gait Training, Safety Education & Training, Patient/Caregiver Education & Training  Safety Devices  Type of devices:  All fall risk precautions in place, Bed alarm in place, Call light within reach, Nurse notified, Left in bed  Restraints  Initially in place: No     Therapy Time   Individual Concurrent Group Co-treatment   Time In 1130         Time Out 1153         Minutes 23 Timed Code Treatment Minutes: [address] [place] [address] [id number]

## 2021-01-03 PROCEDURE — 6360000002 HC RX W HCPCS: Performed by: ORTHOPAEDIC SURGERY

## 2021-01-03 PROCEDURE — 6370000000 HC RX 637 (ALT 250 FOR IP): Performed by: ORTHOPAEDIC SURGERY

## 2021-01-03 PROCEDURE — 1200000000 HC SEMI PRIVATE

## 2021-01-03 PROCEDURE — 6370000000 HC RX 637 (ALT 250 FOR IP): Performed by: INTERNAL MEDICINE

## 2021-01-03 PROCEDURE — 2580000003 HC RX 258: Performed by: ANESTHESIOLOGY

## 2021-01-03 RX ADMIN — Medication 10 ML: at 20:39

## 2021-01-03 RX ADMIN — STANDARDIZED SENNA CONCENTRATE AND DOCUSATE SODIUM 1 TABLET: 8.6; 5 TABLET ORAL at 09:04

## 2021-01-03 RX ADMIN — Medication 10 ML: at 09:05

## 2021-01-03 RX ADMIN — STANDARDIZED SENNA CONCENTRATE AND DOCUSATE SODIUM 1 TABLET: 8.6; 5 TABLET ORAL at 20:39

## 2021-01-03 RX ADMIN — ENOXAPARIN SODIUM 40 MG: 40 INJECTION SUBCUTANEOUS at 20:39

## 2021-01-03 RX ADMIN — AMLODIPINE BESYLATE 5 MG: 5 TABLET ORAL at 09:03

## 2021-01-03 ASSESSMENT — PAIN SCALES - PAIN ASSESSMENT IN ADVANCED DEMENTIA (PAINAD)
BODYLANGUAGE: 0
BODYLANGUAGE: 0
NEGVOCALIZATION: 0
NEGVOCALIZATION: 0
TOTALSCORE: 0
FACIALEXPRESSION: 0
CONSOLABILITY: 0
BREATHING: 0
FACIALEXPRESSION: 0
CONSOLABILITY: 0
NEGVOCALIZATION: 0
BODYLANGUAGE: 0
NEGVOCALIZATION: 0
BODYLANGUAGE: 0
CONSOLABILITY: 0
FACIALEXPRESSION: 0
FACIALEXPRESSION: 0
BREATHING: 0
BODYLANGUAGE: 0
TOTALSCORE: 0
BREATHING: 0
NEGVOCALIZATION: 0
CONSOLABILITY: 0
BODYLANGUAGE: 0
BODYLANGUAGE: 0
BREATHING: 0
FACIALEXPRESSION: 0
TOTALSCORE: 0
CONSOLABILITY: 0

## 2021-01-03 ASSESSMENT — PAIN SCALES - WONG BAKER
WONGBAKER_NUMERICALRESPONSE: 0

## 2021-01-03 ASSESSMENT — PAIN SCALES - GENERAL
PAINLEVEL_OUTOF10: 0
PAINLEVEL_OUTOF10: 0

## 2021-01-03 NOTE — PLAN OF CARE
Problem: Falls - Risk of:  Goal: Will remain free from falls  Description: Will remain free from falls  1/3/2021 0711 by Yuri Harris RN  Outcome: Ongoing  1/2/2021 2310 by Marti Cary  Outcome: Ongoing  Goal: Absence of physical injury  Description: Absence of physical injury  1/3/2021 0711 by Yuri Harris RN  Outcome: Ongoing  1/2/2021 2310 by Marti Cary  Outcome: Ongoing     Problem: Skin Integrity:  Goal: Will show no infection signs and symptoms  Description: Will show no infection signs and symptoms  1/3/2021 0711 by Yuri Harris RN  Outcome: Ongoing  1/2/2021 2310 by Marti Cary  Outcome: Ongoing  Goal: Absence of new skin breakdown  Description: Absence of new skin breakdown  1/3/2021 0711 by Yuri Harris RN  Outcome: Ongoing  1/2/2021 2310 by Marti Cary  Outcome: Ongoing     Problem: Pain:  Goal: Pain level will decrease  Description: Pain level will decrease  1/3/2021 0711 by Yuri Harris RN  Outcome: Ongoing  1/2/2021 2310 by Marti Cary  Outcome: Ongoing  Goal: Control of acute pain  Description: Control of acute pain  1/3/2021 0711 by Yuri Harris RN  Outcome: Ongoing  1/2/2021 2310 by Marti Cary  Outcome: Ongoing  Goal: Control of chronic pain  Description: Control of chronic pain  1/3/2021 0711 by Yuri aHrris RN  Outcome: Ongoing  1/2/2021 2310 by Marti Cary  Outcome: Ongoing     Problem:  Activity:  Goal: Ability to ambulate will improve  Description: Ability to ambulate will improve  1/3/2021 0711 by Yuri Harris RN  Outcome: Ongoing  1/2/2021 2310 by Marti Cary  Outcome: Ongoing  Goal: Ability to perform activities at highest level will improve  Description: Ability to perform activities at highest level will improve  1/3/2021 0711 by Yuri Harris RN  Outcome: Ongoing  1/2/2021 2310 by Marti Cary  Outcome: Ongoing     Problem: Physical Regulation:  Goal: Will remain free from infection  Description: Will remain free from infection  1/3/2021 0711 by Alma Rosa Mejia RN  Outcome: Ongoing  1/2/2021 2310 by Anel Lovett  Outcome: Ongoing  Goal: Postoperative complications will be avoided or minimized  Description: Postoperative complications will be avoided or minimized  1/3/2021 0711 by Alma Rosa Mejia RN  Outcome: Ongoing  1/2/2021 2310 by Anel Lovett  Outcome: Ongoing  Goal: Diagnostic test results will improve  Description: Diagnostic test results will improve  1/3/2021 0711 by Alma Rosa Mejia RN  Outcome: Ongoing  1/2/2021 2310 by Anel Lovett  Outcome: Ongoing

## 2021-01-03 NOTE — PROGRESS NOTES
9:15 AM  AM assessment complete. VSS. A&O to person only. Pt only able to answer some questions. Breath sounds diminished bilaterally. MARYAM assess reflexes, pt unable to follow commands. Pedal pulses palpable. R hip dressing clean, dry, intact. When asked if in pain, pt responds \"I don't know\". Per advanced dementia scale, pain 0/10. Call light within reach.

## 2021-01-03 NOTE — PROGRESS NOTES
2045 Patient assessment complete. Took medications without difficulties. No needs noted at this time. Care plan and education reviewed and agreed upon with patient. Bed in low position, call light within reach and bed alarm on. Will continue to monitor.

## 2021-01-03 NOTE — PROGRESS NOTES
Department of Internal Medicine  General Internal Medicine   Progress Note      SUBJECTIVE  No fever unusual pain  Appetite fair , mental status pleasantly confused      History obtained from chart review, the patient and nursing staff  General ROS: positive for  - fatigue, malaise, sleep disturbance and weight loss  negative for - chills, fever or night sweats  Psychological ROS: positive for - anxiety, disorientation, irritability and memory difficulties  negative for - behavioral disorder, hallucinations or hostility  Ophthalmic ROS: negative  Respiratory ROS: no cough, shortness of breath, or wheezing  Cardiovascular ROS: positive for - dyspnea on exertion  negative for - chest pain  Gastrointestinal ROS: no abdominal pain, change in bowel habits, or black or bloody stools  Genito-Urinary ROS: incontinent   Musculoskeletal ROS: right hip pain unable to ambulate   Neurological ROS: no TIA or stroke symptoms  Dermatological ROS: negative    OBJECTIVE      Medications      Current Facility-Administered Medications: amLODIPine (NORVASC) tablet 5 mg, 5 mg, Oral, Daily  sodium chloride flush 0.9 % injection 10 mL, 10 mL, Intravenous, 2 times per day  sodium chloride flush 0.9 % injection 10 mL, 10 mL, Intravenous, PRN  sennosides-docusate sodium (SENOKOT-S) 8.6-50 MG tablet 1 tablet, 1 tablet, Oral, BID  magnesium hydroxide (MILK OF MAGNESIA) 400 MG/5ML suspension 30 mL, 30 mL, Oral, Daily PRN  promethazine (PHENERGAN) tablet 12.5 mg, 12.5 mg, Oral, Q6H PRN **OR** ondansetron (ZOFRAN) injection 4 mg, 4 mg, Intravenous, Q6H PRN  enoxaparin (LOVENOX) injection 40 mg, 40 mg, Subcutaneous, Nightly  traMADol (ULTRAM) tablet 50 mg, 50 mg, Oral, Q6H PRN **OR** traMADol (ULTRAM) tablet 100 mg, 100 mg, Oral, Q6H PRN  morphine (PF) injection 2 mg, 2 mg, Intravenous, Q2H PRN **OR** morphine injection 4 mg, 4 mg, Intravenous, Q2H PRN  acetaminophen (TYLENOL) tablet 650 mg, 650 mg, Oral, Q4H PRN  sodium chloride flush 0.9 % 0.6 - 1.2 mg/dL    GFR Non-African American >60 >60    GFR African American >60 >60    Calcium 8.6 8.3 - 10.6 mg/dL   CBC    Collection Time: 12/31/20  7:33 AM   Result Value Ref Range    WBC 5.3 4.0 - 11.0 K/uL    RBC 3.40 (L) 4.00 - 5.20 M/uL    Hemoglobin 10.0 (L) 12.0 - 16.0 g/dL    Hematocrit 31.1 (L) 36.0 - 48.0 %    MCV 91.3 80.0 - 100.0 fL    MCH 29.5 26.0 - 34.0 pg    MCHC 32.3 31.0 - 36.0 g/dL    RDW 15.6 (H) 12.4 - 15.4 %    Platelets 456 131 - 586 K/uL    MPV 8.5 5.0 - 10.5 fL   CBC    Collection Time: 01/01/21  7:30 AM   Result Value Ref Range    WBC 5.5 4.0 - 11.0 K/uL    RBC 3.88 (L) 4.00 - 5.20 M/uL    Hemoglobin 11.5 (L) 12.0 - 16.0 g/dL    Hematocrit 35.3 (L) 36.0 - 48.0 %    MCV 91.0 80.0 - 100.0 fL    MCH 29.6 26.0 - 34.0 pg    MCHC 32.6 31.0 - 36.0 g/dL    RDW 16.0 (H) 12.4 - 15.4 %    Platelets 366 977 - 865 K/uL    MPV 7.7 5.0 - 10.5 fL   CBC    Collection Time: 01/02/21  6:59 AM   Result Value Ref Range    WBC 4.3 4.0 - 11.0 K/uL    RBC 4.11 4.00 - 5.20 M/uL    Hemoglobin 12.0 12.0 - 16.0 g/dL    Hematocrit 37.3 36.0 - 48.0 %    MCV 90.7 80.0 - 100.0 fL    MCH 29.1 26.0 - 34.0 pg    MCHC 32.1 31.0 - 36.0 g/dL    RDW 15.8 (H) 12.4 - 15.4 %    Platelets 724 634 - 947 K/uL    MPV 8.4 5.0 - 10.5 fL       ASSESSMENT AND PLAN     Active Problems:    A-fib (HCC)    HTN (hypertension)    Hypoxemia    Congestive heart failure (HCC)    Hip fracture, right, closed, initial encounter ([facility])    Subcapital fracture of neck of right femur ([facility])    UTI (urinary tract infection)    Bradycardia    ASHD (arteriosclerotic heart disease)  Resolved Problems:    * No resolved hospital problems.  *    No new orders just awaiting discharge probably will not happen till Monday AM     This patient is discharge appropriate  and I have put dismissal orders , if this patient still remains in the hospital that would be purely for administrative reasons which are beyond my control and I sincerely hope it should not affect my

## 2021-01-04 VITALS
DIASTOLIC BLOOD PRESSURE: 77 MMHG | HEIGHT: 62 IN | BODY MASS INDEX: 19.14 KG/M2 | OXYGEN SATURATION: 97 % | HEART RATE: 73 BPM | WEIGHT: 104 LBS | SYSTOLIC BLOOD PRESSURE: 170 MMHG | TEMPERATURE: 97.5 F | RESPIRATION RATE: 16 BRPM

## 2021-01-04 PROCEDURE — 6370000000 HC RX 637 (ALT 250 FOR IP): Performed by: ORTHOPAEDIC SURGERY

## 2021-01-04 PROCEDURE — 2580000003 HC RX 258: Performed by: ANESTHESIOLOGY

## 2021-01-04 PROCEDURE — 2700000000 HC OXYGEN THERAPY PER DAY

## 2021-01-04 PROCEDURE — 94760 N-INVAS EAR/PLS OXIMETRY 1: CPT

## 2021-01-04 PROCEDURE — 99024 POSTOP FOLLOW-UP VISIT: CPT | Performed by: NURSE PRACTITIONER

## 2021-01-04 PROCEDURE — APPNB30 APP NON BILLABLE TIME 0-30 MINS: Performed by: NURSE PRACTITIONER

## 2021-01-04 PROCEDURE — 6370000000 HC RX 637 (ALT 250 FOR IP): Performed by: INTERNAL MEDICINE

## 2021-01-04 RX ADMIN — STANDARDIZED SENNA CONCENTRATE AND DOCUSATE SODIUM 1 TABLET: 8.6; 5 TABLET ORAL at 10:26

## 2021-01-04 RX ADMIN — Medication 10 ML: at 10:27

## 2021-01-04 RX ADMIN — AMLODIPINE BESYLATE 5 MG: 5 TABLET ORAL at 10:26

## 2021-01-04 NOTE — CARE COORDINATION
CM called [email or web address], he stated he is ready for patient to return. Discharge Plan:     Patient discharged to: [facility]  SW/DC [address] [facility] faxed, [address] and JATIN to: [phone]  Narcotic Prescriptions faxed were:  RN: (Lupillo Hermosillo) will call report to:     [phone] with: [address] [facility] [place] [facility] [place]  [phone]   time: [facility]  Family advised of discharge?:yes  HENS Submitted?:  Not required, returning to [facility]  All discharge needs met per case management.       Cassie Hussein, KYRAN, CCM, RN  [facility]  [phone]

## 2021-01-04 NOTE — PROGRESS NOTES
Department of Internal Medicine  General Internal Medicine   Progress Note      SUBJECTIVE  No fever or unusual pain reported     History obtained from chart review, the patient and nursing staff  General ROS: positive for  - fatigue, malaise, sleep disturbance and weight loss  negative for - chills, fever or night sweats  Psychological ROS: positive for - anxiety, disorientation, irritability and memory difficulties  negative for - behavioral disorder, hallucinations or hostility  Ophthalmic ROS: negative  Respiratory ROS: no cough, shortness of breath, or wheezing  Cardiovascular ROS: positive for - dyspnea on exertion  negative for - chest pain  Gastrointestinal ROS: no abdominal pain, change in bowel habits, or black or bloody stools  Genito-Urinary ROS: incontinent   Musculoskeletal ROS: right hip pain unable to ambulate   Neurological ROS: no TIA or stroke symptoms  Dermatological ROS: negative    OBJECTIVE      Medications      Current Facility-Administered Medications: amLODIPine (NORVASC) tablet 5 mg, 5 mg, Oral, Daily  sodium chloride flush 0.9 % injection 10 mL, 10 mL, Intravenous, 2 times per day  sodium chloride flush 0.9 % injection 10 mL, 10 mL, Intravenous, PRN  sennosides-docusate sodium (SENOKOT-S) 8.6-50 MG tablet 1 tablet, 1 tablet, Oral, BID  magnesium hydroxide (MILK OF MAGNESIA) 400 MG/5ML suspension 30 mL, 30 mL, Oral, Daily PRN  promethazine (PHENERGAN) tablet 12.5 mg, 12.5 mg, Oral, Q6H PRN **OR** ondansetron (ZOFRAN) injection 4 mg, 4 mg, Intravenous, Q6H PRN  enoxaparin (LOVENOX) injection 40 mg, 40 mg, Subcutaneous, Nightly  traMADol (ULTRAM) tablet 50 mg, 50 mg, Oral, Q6H PRN **OR** traMADol (ULTRAM) tablet 100 mg, 100 mg, Oral, Q6H PRN  morphine (PF) injection 2 mg, 2 mg, Intravenous, Q2H PRN **OR** morphine injection 4 mg, 4 mg, Intravenous, Q2H PRN  acetaminophen (TYLENOL) tablet 650 mg, 650 mg, Oral, Q4H PRN  sodium chloride flush 0.9 % injection 10 mL, 10 mL, Intravenous, 2 times per day  sodium chloride flush 0.9 % injection 10 mL, 10 mL, Intravenous, PRN    Physical      Vitals: BP (!) 161/74   Pulse 71   Temp 98.8 °F (37.1 °C) (Oral)   Resp 16   Ht 5' 2\" (1.575 m)   Wt 104 lb (47.2 kg)   SpO2 97%   BMI 19.02 kg/m²   Temp: Temp: 98.8 °F (37.1 °C)  Max: Temp  Av.1 °F (36.7 °C)  Min: 97.4 °F (36.3 °C)  Max: 98.8 °F (37.1 °C)  Respiration range:  Resp  Av  Min: 16  Max: 16  Pulse Range:  Pulse  Av.8  Min: 58  Max: 79  Blood pressure range:  Systolic (45YWE), SBS:068 , Min:151 , CBX:577   , Diastolic (66PES), GZH:64, Min:67, Max:88    SpO2  Av.7 %  Min: 97 %  Max: 99 %    Intake/Output Summary (Last 24 hours) at 2021 1034  Last data filed at 1/3/2021 2250  Gross per 24 hour   Intake --   Output 450 ml   Net -450 ml       Vent settings:  Pulse  Av.8  Min: 54  Max: 95  Resp  Av.5  Min: 1  Max: 29  SpO2  Av.9 %  Min: 83 %  Max: 100 %    CONSTITUTIONAL:  fatigued, somnolent, uncooperative, distracted, mild distress, appears stated age and thin  EYES:  Unremarkable   NECK:  No JVD  and supple, symmetrical, trachea midline  BACK:  Kyphotic  and symmetric  LUNGS:  Poor inspiration , vesicular  No wheezes   CARDIOVASCULAR:  RRR S1 S2, normal apical pulses, regular rate and rhythm and normal S1 and S2  ABDOMEN:  Soft scaphoid BS + non tender   MUSCULOSKELETAL:  Tenderness right hip restricted ROM right hip post op site looks free from any complications   NEUROLOGIC:  General weakness poor co-ordination   SKIN:  Warm dry and pale  and no bruising or bleeding    Data      Recent Results (from the past 96 hour(s))   CBC    Collection Time: 21  7:30 AM   Result Value Ref Range    WBC 5.5 4.0 - 11.0 K/uL    RBC 3.88 (L) 4.00 - 5.20 M/uL    Hemoglobin 11.5 (L) 12.0 - 16.0 g/dL    Hematocrit 35.3 (L) 36.0 - 48.0 %    MCV 91.0 80.0 - 100.0 fL    MCH 29.6 26.0 - 34.0 pg    MCHC 32.6 31.0 - 36.0 g/dL    RDW 16.0 (H) 12.4 - 15.4 %    Platelets 010 112 - 589 K/uL MPV 7.7 5.0 - 10.5 fL   CBC    Collection Time: 01/02/21  6:59 AM   Result Value Ref Range    WBC 4.3 4.0 - 11.0 K/uL    RBC 4.11 4.00 - 5.20 M/uL    Hemoglobin 12.0 12.0 - 16.0 g/dL    Hematocrit 37.3 36.0 - 48.0 %    MCV 90.7 80.0 - 100.0 fL    MCH 29.1 26.0 - 34.0 pg    MCHC 32.1 31.0 - 36.0 g/dL    RDW 15.8 (H) 12.4 - 15.4 %    Platelets 862 981 - 015 K/uL    MPV 8.4 5.0 - 10.5 fL       ASSESSMENT AND PLAN     Active Problems:    A-fib (HCC)    HTN (hypertension)    Hypoxemia    Congestive heart failure (HCC)    Hip fracture, right, closed, initial encounter ([facility] [address]    Subcapital fracture of neck of right femur ([facility])    UTI (urinary tract infection)    Bradycardia    ASHD (arteriosclerotic heart disease)  Resolved Problems:    * No resolved hospital problems.  *     discharge on Monday when arrangements can be made , no new orders

## 2021-01-04 NOTE — PROGRESS NOTES
Shift assessment completed. Medications given per MAR. Patient resting in bed without distress. Fall precaution in place. The care plan and education has been reviewed.

## 2021-01-04 NOTE — PROGRESS NOTES
[facility] Orthopedic Surgery   Progress Note    CHIEF COMPLAINT/DIAGNOSIS:  12/30/20 RIGHT hip percutaneous pinning    SUBJECTIVE: Patient lying in bed. Awake but nonverbal with examiner. Hx dementia    OBJECTIVE  Physical    VITALS:  BP (!) 161/74   Pulse 71   Temp 98.8 °F (37.1 °C) (Oral)   Resp 16   Ht 5' 2\" (1.575 m)   Wt 104 lb (47.2 kg)   SpO2 97%   BMI 19.02 kg/m²     GENERAL: Alert, NAD  MUSCULOSKELETAL: RIGHT LE  INCISION:  Dressing c/d/i  ROM: unable to assess  Sensory:  Unable to assess  Vascular:   Intact DP pulse;  calf soft    Data    ALL MEDICATIONS HAVE BEEN REVIEWED    CBC:   Recent Labs     01/02/21  0659   WBC 4.3   HGB 12.0   HCT 37.3        BMP:   No results for input(s): NA, K, CL, CO2, PHOS, BUN, CREATININE in the last 72 hours. Invalid input(s): CA  INR:   No results for input(s): INR in the last 72 hours. ASSESSMENT:  12/30/20 RIGHT hip percutaneous pinning, POD#5  Dementia  Atrial fibrillation (no A/C)  CHF  HLD  HTN  Thyroid disease    PLAN:   - WB status:  25% WB  - DVT prophylaxis: Lovenox  - PT/OT  - D/C Plan: Return to [facility] when plans in place  - Pain Control: current regimen. Due to orthopaedic surgical procedure/condition, patient may require pain medication for up to 6-8 weeks. - F U with Dr. Toño Uriarte in 2 weeks; call [phone] for appt.        SALVADOR NAVARRETE-CNP  1/4/2021  10:22 AM    .

## 2021-01-04 NOTE — PROGRESS NOTES
Attempted to call report to [facility], was transferred, but had to leave a voicemail. Left contact information and waiting for a return call.

## 2021-01-04 NOTE — PROGRESS NOTES
Shift assessment completed. VSS. Patient oriented to self only. Dressing to R hip CDI. The care plan and education have been reviewed and mutually agreed upon with the patient. Call light in reach. Will continue to monitor.

## 2021-01-04 NOTE — PLAN OF CARE
Problem: Falls - Risk of:  Goal: Will remain free from falls  Description: Will remain free from falls  1/3/2021 1910 by Selvin Jones RN  Outcome: Ongoing  1/3/2021 0711 by Nirmal Duncan RN  Outcome: Ongoing  Goal: Absence of physical injury  Description: Absence of physical injury  1/3/2021 1910 by Selvin Jones RN  Outcome: Ongoing  1/3/2021 0711 by Nirmal Duncan RN  Outcome: Ongoing     Problem: Skin Integrity:  Goal: Will show no infection signs and symptoms  Description: Will show no infection signs and symptoms  1/3/2021 1910 by Selvin Jones RN  Outcome: Ongoing  1/3/2021 0711 by Nirmal Duncan RN  Outcome: Ongoing  Goal: Absence of new skin breakdown  Description: Absence of new skin breakdown  1/3/2021 1910 by Selvin Jones RN  Outcome: Ongoing  1/3/2021 0711 by Nirmal Duncan RN  Outcome: Ongoing     Problem: Pain:  Goal: Pain level will decrease  Description: Pain level will decrease  1/3/2021 1910 by Selvin Jones RN  Outcome: Ongoing  1/3/2021 0711 by Nirmal Duncan RN  Outcome: Ongoing  Goal: Control of acute pain  Description: Control of acute pain  1/3/2021 1910 by Selvin Jones RN  Outcome: Ongoing  1/3/2021 0711 by Nirmal Duncan RN  Outcome: Ongoing  Goal: Control of chronic pain  Description: Control of chronic pain  1/3/2021 1910 by Selvin Jones RN  Outcome: Ongoing  1/3/2021 0711 by Nirmal Duncan RN  Outcome: Ongoing     Problem:  Activity:  Goal: Ability to ambulate will improve  Description: Ability to ambulate will improve  1/3/2021 1910 by Selvin Jones RN  Outcome: Ongoing  1/3/2021 0711 by Nirmal Duncan RN  Outcome: Ongoing  Goal: Ability to perform activities at highest level will improve  Description: Ability to perform activities at highest level will improve  1/3/2021 1910 by Selvin Jones RN  Outcome: Ongoing  1/3/2021 0711 by Nirmal Duncan RN  Outcome: Ongoing     Problem: Physical Regulation:  Goal: Will remain free from infection  Description: Will remain free from infection  1/3/2021 1910 by Juan Martínez RN  Outcome: Ongoing  1/3/2021 0711 by Jd Correia RN  Outcome: Ongoing  Goal: Postoperative complications will be avoided or minimized  Description: Postoperative complications will be avoided or minimized  1/3/2021 1910 by Juan Martínez RN  Outcome: Ongoing  1/3/2021 0711 by Jd Correia RN  Outcome: Ongoing  Goal: Diagnostic test results will improve  Description: Diagnostic test results will improve  1/3/2021 1910 by Juan Martínez RN  Outcome: Ongoing  1/3/2021 0711 by Jd Correia RN  Outcome: Ongoing

## 2021-01-04 NOTE — PROGRESS NOTES
Physician Progress Note      Madyson Torres  CSN #:                  [id number]  :                       1925  ADMIT DATE:       2020 11:22 PM  [address] [place] DATE:  RESPONDING  PROVIDER #:        Lucy Mcnally MD          QUERY TEXT:    Pt admitted with right femur fracture and is s/p ORIF. Matilda Milligan Pt noted to have   confusion. If possible, please document in the progress notes and discharge   summary if you are evaluating and / or treating any of the following: The medical record reflects the following:  Risk Factors: right hip fracture, s/p ORIF, UTI, dementia  Clinical Indicators: patient is alert but is not oriented all the time. Nursing assessment reveals waxing and waning orientation. Per nursing notes,   \"Pt is alert and oriented to self only. Able to follow commands throughout   assessment. .... A&O to person only. Pt only able to answer some questions. .   Per primary care physician documentation: \"mental status pleasantly confused   ? .Awake but nonverbal with examiner\". Treatment: Placed close to nurses station, Q 1 Hour nursing rounds. Options provided:  -- Metabolic encephalopathy due to UTI  -- Toxic encephalopathy from anesthesia  -- Encephalopathy due to medications or drugs, Please specify  -- Dementia without behavioral disturbances  -- Other - I will add my own diagnosis  -- Disagree - Not applicable / Not valid  -- Disagree - Clinically unable to determine / Unknown  -- Refer to Clinical Documentation Reviewer    PROVIDER RESPONSE TEXT:    Patient has dementia without behavioral disturbances, patient is at baseline.     Query created by: Jesus Dorado on 2021 1:06 PM      Electronically signed by:  Lucy Mcnally MD 2021 1:58 PM

## 2021-01-19 ENCOUNTER — OFFICE VISIT (OUTPATIENT)
Dept: ORTHOPEDIC SURGERY | Age: 86
End: 2021-01-19

## 2021-01-19 VITALS — WEIGHT: 104 LBS | TEMPERATURE: 97.2 F | HEIGHT: 62 IN | BODY MASS INDEX: 19.14 KG/M2

## 2021-01-19 DIAGNOSIS — S72.011D CLOSED SUBCAPITAL FRACTURE OF RIGHT FEMUR WITH ROUTINE HEALING, SUBSEQUENT ENCOUNTER: Primary | ICD-10-CM

## 2021-01-19 PROCEDURE — 99024 POSTOP FOLLOW-UP VISIT: CPT | Performed by: ORTHOPAEDIC SURGERY

## 2021-01-19 NOTE — LETTER
[facility]  [facility] [address] [address]  Phone: [phone]  Fax: [phone]    Josep Turk MD        January 19, 2021    [address] [place] [address]    To Whom it May Concern:    Rey Graham was seen in office today. Her treatment plan is as follows:    I have told the patient to work on ROM and strengthening exercises with PT.     25% weight bearing. She is nonabmulatory at baseline, so not an issue.     She can follow up in 5 weeks. If they are able to perform AP and lateral views of the right hip, and AP pelvis xrays at [facility] and get the images to me, then we can just do a virtual visit with her son or I can just call her son. If you have any questions or concerns, please don't hesitate to call.     Sincerely,          Josep Turk MD

## 2021-01-19 NOTE — PROGRESS NOTES
DIAGNOSIS:  Right nondisplaced femoral neck fracture, status post hip pinning. DATE OF SURGERY:  12/30/20 . HISTORY OF PRESENT ILLNESS: Ms. Damián Jackson is a 80 y.o. female  who came in today for postoperative visit. The patient rates pain 0/10. She has been restricted to 25% weight bearing. She was nonambulatory at baseline for the past year. No numbness or tingling sensation. No fever or Chills. The patient is at [facility]. PHYSICAL EXAMINATION:    Temp 97.2 °F (36.2 °C)   Ht 5' 2\" (1.575 m)   Wt 104 lb (47.2 kg)   BMI 19.02 kg/m²   Patient is awake, alert, and in no acute distress. The incision is healed well, all staples were removed . No signs of any erythema or drainage. She has no pain with the active or passive range of motion of the right hip. She has intact sensation to light touch distally, and is neurovascularly intact bilaterally. IMAGING:  Two views right  Hip, and AP pelvis obtained. Suboptimal positioning of the leg, compared to the intraop fluoro images. Hard to determine alignment of the femoral neck fracture. Screws are intact. .        IMPRESSION:   3 weeks status post right hip pining of nondisplaced femoral neck fracture. Dementia  DNR-CC  Afib  CHF      PLAN:   I have told the patient to work on ROM and strengthening exercises with PT.    25% weight bearing. She is nonabmulatory at baseline, so not an issue. She can follow up in 5 weeks. If they are able to perform AP and lateral views of the right hip, and AP pelvis xrays at [facility] and get the images to me, then we can just do a virtual visit with her son or I can just call her son.

## 2021-01-28 PROBLEM — N39.0 UTI (URINARY TRACT INFECTION): Status: RESOLVED | Noted: 2020-12-29 | Resolved: 2021-01-28

## 2021-02-11 NOTE — DISCHARGE SUMMARY
[address]                     [address] [facility], [address] [place] [address] Drive                               DISCHARGE SUMMARY    PATIENT NAME: Zenaida Valdes                     :        1925  MED REC NO:   [id number]                          ROOM:       4469  ACCOUNT NO:   [de-identified]                           ADMIT DATE: 2020  PROVIDER:     Anisha Fontanez MD                  DISCHARGE DATE:  2021    FINAL DIAGNOSES:  1.  Subcapital fracture of the right femur. 2.  Senile dementia. 3.  Urinary tract infection. 4.  Atherosclerotic heart disease. 5.  Hypertension. 6.  Hypothyroidism. 7.  Chronic combined heart failure. 8.  Chronic atrial fibrillation. 9.  Sinus bradycardia. 10.  Hypoxemia. DISCHARGE MEDICATIONS:  1. Tylenol 500 four times daily p.r.n.  2.  DuoNeb one every four hours p.r.n.  3.  Tramadol 50 every eight hours p.r.n.  4.  Ativan 0.5 mg one tablet every eight hours p.r.n.  5.  Lovenox 40 mg subcu q.24 for 14 days. 6.  Phenergan 12.5 every six hours p.r.n.  7.  Acidophilus capsule 500 mg b.i.d.  8.  Iron tablets, ferrous sulfate 335 p.o. b.i.d.  9.  Protonix 40 mg once a day. 10.  MiraLAX powder 17 gm once a day. 11.  Salt maltodextrin 10 ounce by mouth as directed. 12.  Robitussin  mg by mouth four times daily p.r.n. for cough. 13.  Senokot-S two tablets daily. 14.  Vitamin D3 2000 units daily. 15.  Celexa 10 mg once a day. 16.  Tramadol 50 mg every four hours as needed. 17.  Claritin 10 mg once a day. 18.  Synthroid 75 mcg once a day. HOSPITAL COURSE:  This elderly woman came to the emergency room  following an accidental fall. The patient had a subcapital fracture  with pain in the hip and inability to ambulate. It happened in the  right hip. The patient had stable vital signs. It is not very clear  how she fell but it could have been a stumbling event.   Her temperature was 98.3, blood pressure 124/60, respirations 16, and heart rate is 88. Oral mucosa dry. Skin is warm and dry. Neck is supple. Lungs were  clear to auscultation. The patient had subcapital fracture of the right  hip. The patient had right lower extremity pain with her extremity in  sort of external rotation and apparent limb length shortening. The  patient was cleared for surgery by me. Dr. Kishor Miller did the  prosthetic replacement. Postoperatively, the patient was given PT, OT,  and DVT prophylaxis. Hemoglobin/hematocrit monitoring. General  condition remained reasonably stable. The patient also had a urinary  tract infection but cultures were negative. COVID-19 test was negative. Urinalysis did consist of some wbc and bacteriuria. After four days of  treatment, general condition improved enough that the patient really did  not need hospital care anymore. The patient was transferred to skilled  nursing facility on 01/04. We made necessary arrangement for the  patient to go to [facility] under skilled nursing facility  placement. The patient will be following up with Dr. Kishor Miller as an  outpatient. Dallas Alanis MD    D: 02/10/2021 16:43:45       T: 02/10/2021 16:50:21     [id number]  Job#: [id number]     Doc#: [id number]    CC:   [facility]

## 2021-02-26 ENCOUNTER — TELEPHONE (OUTPATIENT)
Dept: ORTHOPEDIC SURGERY | Age: 86
End: 2021-02-26

## 2021-02-26 NOTE — TELEPHONE ENCOUNTER
S/W TORI who states they are unable to push the XR images for patient for [facility] review. Provided number for [facility] to discuss with them. S/W YOLANDA at [facility] regarding obtaining the XR images of patient completed on 2/22/2021 at [facility]. She states that a copy of the images will be mailed, confirmation # [id number] via [employer].

## 2021-03-02 ENCOUNTER — TELEPHONE (OUTPATIENT)
Dept: ORTHOPEDIC SURGERY | Age: 86
End: 2021-03-02

## 2021-03-11 ENCOUNTER — OFFICE VISIT (OUTPATIENT)
Dept: ORTHOPEDIC SURGERY | Age: 86
End: 2021-03-11

## 2021-03-11 VITALS — TEMPERATURE: 97.3 F | RESPIRATION RATE: 14 BRPM | WEIGHT: 104 LBS | BODY MASS INDEX: 19.14 KG/M2 | HEIGHT: 62 IN

## 2021-03-11 DIAGNOSIS — S72.011D CLOSED SUBCAPITAL FRACTURE OF RIGHT FEMUR WITH ROUTINE HEALING, SUBSEQUENT ENCOUNTER: Primary | ICD-10-CM

## 2021-03-11 PROCEDURE — 99024 POSTOP FOLLOW-UP VISIT: CPT | Performed by: ORTHOPAEDIC SURGERY

## 2021-03-11 NOTE — LETTER
[facility]  [address]  Phone: [phone]  Fax: [phone]    Alycia Lea MD        March 11, 2021     Patient: Shayy Rahman   YOB: 1925   Date of Visit: 3/11/2021     To Whom It May Concern: It is my medical opinion that [facility]:  IMPRESSION:   2 1/2 months status post right hip pining of nondisplaced femoral neck fracture. Dementia  DNR-CC  Afib  CHF    PLAN:   Try to work on ROM and strengthening exercises with PT. Weight bearing as tolerated. She is nonabmulatory at baseline. Follow up prn. If you have any questions or concerns, please don't hesitate to call.     Sincerely,    Aylcia Lea MD

## 2021-03-11 NOTE — PROGRESS NOTES
DIAGNOSIS:  Right nondisplaced femoral neck fracture, status post hip pinning. DATE OF SURGERY:  12/30/20 . HISTORY OF PRESENT ILLNESS: Ms. Johnny Carlton is a 80 y.o. female  who came in today for postoperative visit. The patient rates pain 0/10. She is at [facility]. PHYSICAL EXAMINATION:    Temp 97.3 °F (36.3 °C) (Infrared)   Resp 14   Ht 5' 2\" (1.575 m)   Wt 104 lb (47.2 kg)   BMI 19.02 kg/m²    Patient is awake, alert, and in no acute distress. She has no pain with the active or passive range of motion of the right hip. She has intact sensation to light touch distally, and is neurovascularly intact bilaterally. IMAGING:  Two views right  Hip, and AP pelvis obtained. Suboptimal positioning of the leg, compared to the intraop fluoro images. Hard to determine alignment of the femoral neck fracture. Screws are intact. .        IMPRESSION:   2 1/2 months status post right hip pining of nondisplaced femoral neck fracture. Dementia  DNR-CC  Afib  CHF      PLAN:   Try to work on ROM and strengthening exercises with PT. Weight bearing as tolerated. She is nonabmulatory at baseline. Follow up prn.

## 2025-03-03 NOTE — DISCHARGE INSTR - COC
Continuity of Care Form    Patient Name: Janet Kidd   :  1925  MRN:  [id number]    Admit date:  2020  Discharge date:  2021    Code Status Order: Prior   Advance Directives:   [facility] Documentation       Date/Time Healthcare Directive Type of Healthcare Directive Copy in [address] Agent's Name Healthcare Agent's Phone Number    [phone]  Yes, patient has an advance directive for healthcare treatment  Health care treatment directive  Yes, copy in chart  Adult Children  Tacey Chimera  [phone]            Admitting Physician:  Dex Mills MD  PCP: Derrick Kong MD    Discharging Nurse: [facility] Unit/Room#: 5IR-4399/7964-18  Discharging Unit Phone Number: [phone]    Emergency Contact:   Extended Emergency Contact Information  Primary Emergency Contact: Esteban [address] Phone: [phone]  Mobile Phone: [phone]  Relation: Child    Past Surgical History:  Past Surgical History:   Procedure Laterality Date    EYE SURGERY      HIP SURGERY Left 14    LEFT HIP HEMIARTHROPLASTY                 HYSTERECTOMY         Immunization History:   Immunization History   Administered Date(s) Administered    Influenza 10/24/2011, 10/22/2012, 10/21/2013    Influenza, High Dose (Fluzone 65 yrs and older) 2014, 10/12/2015, 10/11/2016, 10/03/2017, 10/18/2018    Pneumococcal Conjugate 13-valent (Alfonzo Silence) 10/12/2015    Pneumococcal Polysaccharide ([id number]) 2017       Active Problems:  Patient Active Problem List   Diagnosis Code    A-fib ([facility] I48.91    HTN (hypertension) I10    Hypoxemia R09.02    Congestive heart failure ([facility] I50.9    Hip fracture, right, closed, initial encounter ([facility] S72.001A    Subcapital fracture of neck of right femur ([facility] S72.011A    UTI (urinary tract infection) N39.0    Bradycardia R00.1    ASHD (arteriosclerotic heart Pt's mother called to schedule. Offered first available appointment for 3/12/2025. Pt's mother declined.   disease) I25.10       Isolation/Infection:   Isolation            No Isolation          Patient Infection Status       Infection Onset Added Last Indicated Last Indicated By Review Planned Expiration Resolved Resolved By    None active    Resolved    COVID-19 Rule Out 12/28/20 12/28/20 12/29/20 COVID-19 (Ordered)   12/29/20 Rule-Out Test Resulted            Nurse Assessment:  Last Vital Signs: BP (!) 152/62   Pulse 82   Temp 98.2 °F (36.8 °C) (Oral)   Resp 18   Ht 5' 2\" (1.575 m)   Wt 104 lb (47.2 kg)   SpO2 97%   BMI 19.02 kg/m²     Last documented pain score (0-10 scale): Pain Level: 0  Last Weight:   Wt Readings from Last 1 Encounters:   12/28/20 104 lb (47.2 kg)     Mental Status:  disoriented and alert    IV Access:  - None    Nursing Mobility/ADLs:  Walking   Dependent  Transfer  Dependent  Bathing  Dependent  Dressing  Dependent  Toileting  Dependent  Feeding  Assisted  Med Admin  Dependent  Med Delivery   crushed and in applesauce    Wound Care Documentation and Therapy:  Incision 07/26/14 Hip Left (Active)   Number of days: 2349       Wound 06/04/19 Coccyx Posterior stage 1 pressue ulcer  (Active)   Number of days: 575        Elimination:  Continence:   · Bowel: No  · Bladder: No  Urinary Catheter: None   Colostomy/Ileostomy/Ileal Conduit: No       Date of Last BM:     Intake/Output Summary (Last 24 hours) at 12/30/2020 1323  Last data filed at 12/30/2020 1134  Gross per 24 hour   Intake 2100 ml   Output 240 ml   Net 1860 ml     I/O last 3 completed shifts:   In: 1450 [I.V.:1450]  Out: 200 [Urine:200]    Safety Concerns:     History of Falls (last 30 days)    Impairments/Disabilities:      Hearing    Nutrition Therapy:  Current Nutrition Therapy:   - Oral Diet:  General    Routes of Feeding: Oral  Liquids: No Restrictions  Daily Fluid Restriction: no  Last Modified Barium Swallow with Video (Video Swallowing Test): not done    Treatments at the Time of Hospital Discharge:   Respiratory Treatments: ***  Oxygen Therapy:  is on oxygen at 2 L/min per nasal cannula. Ventilator:    {MH CC Vent List:[id number]:::0}    Rehab Therapies: {THERAPEUTIC INTERVENTION:6247238595}  Weight Bearing Status/Restrictions: 25% weight bearing on operative leg  Other Medical Equipment (for information only, NOT a DME order):  {EQUIPMENT:528171235}  Other Treatments: Wound Care:  Change dressing daily as needed and cleanse wound with rubbing alcohol and apply dry dressing and tegaderm  If patient has not seen by Dr. Reny Shipley (or have appointment) by 2 weeks postop, please call office to speak with Dr. William Tuttle assistant about staple removal.    DVT prophylaxis:  Lovenox daily for 14 days postop    Follow Up: Follow up with Dr. Rosa Chu 2 weeks post op. Call [phone] for appointment. You have demonstrated risk factors for osteoporosis, such as age greater than [de-identified] years and evidence of a fracture. Please see your primary care physician for evaluation for osteoporosis, including consideration for DEXA scanning, if this is felt to be clinically indicated.          Patient's personal belongings (please select all that are sent with patient):  Hearing Aides bilateral    RN SIGNATURE:  Electronically signed by Ba Olivares RN on 1/4/21 at 11:40 AM EST    CASE MANAGEMENT/SOCIAL WORK SECTION    Inpatient Status Date: 12/29/2020    Readmission Risk Assessment Score:  Readmission Risk              Risk of Unplanned Readmission:        13           Discharging to Facility/ Agency   · Name: [facility]  · Address:  · Phone:[phone]  · Fax: [phone]    Dialysis Facility (if applicable)   · Name:  · Address:  · Dialysis Schedule:  · Phone:  · Fax:    / signature:LYLE Lopez, CCM, RN  [facility]  [phone]  PHYSICIAN SECTION    Prognosis: Guarded    Condition at Discharge: Stable    Rehab Potential (if transferring to Rehab): Guarded    Recommended Labs or Other Treatments After Discharge: PT OT     Physician Certification: I certify the above information and transfer of Darion Ceballos  is necessary for the continuing treatment of the diagnosis listed and that she requires [facility] for less 30 days.      Update Admission H&P: No change in H&P    PHYSICIAN SIGNATURE:  Electronically signed by Sariah Lazar MD on 1/1/21 at 1:21 PM EST

## (undated) DEVICE — STERILE LATEX POWDER-FREE SURGICAL GLOVESWITH NITRILE COATING: Brand: PROTEXIS

## (undated) DEVICE — CHLORAPREP 26ML ORANGE

## (undated) DEVICE — SUTURE VCRL SZ 2-0 L36IN ABSRB UD L36MM CT-1 1/2 CIR J945H

## (undated) DEVICE — STANDARD HYPODERMIC NEEDLE,POLYPROPYLENE HUB: Brand: MONOJECT

## (undated) DEVICE — SYRINGE, LUER LOCK, 10ML: Brand: MEDLINE

## (undated) DEVICE — PAD,ABDOMINAL,8"X10",ST,LF: Brand: MEDLINE

## (undated) DEVICE — DRESSING W4XL8IN ISLANDTHERABOND 3D

## (undated) DEVICE — LIMB HOLDER, WRIST/ANKLE: Brand: DEROYAL

## (undated) DEVICE — SUTURE VCRL SZ 0 L36IN ABSRB UD L36MM CT-1 1/2 CIR J946H

## (undated) DEVICE — ELECTRODE PT RET AD L9FT HI MOIST COND ADH HYDRGEL CORDED

## (undated) DEVICE — THREADED GUIDE WIRE

## (undated) DEVICE — 6619 2 PTNT ISO SYS INCISE AREA&LT;(&GT;&&LT;)&GT;P: Brand: STERI-DRAPE™ IOBAN™ 2

## (undated) DEVICE — SOLUTION IV IRRIG POUR BRL 0.9% SODIUM CHL 2F7124

## (undated) DEVICE — MAT FLR ABS DISP

## (undated) DEVICE — CANNULATED DRILL

## (undated) DEVICE — 3M™ COBAN™ STERILE SELF-ADHERENT WRAP, 1584S, 4 IN X 5 YD (10 CM X 4,5 M), 18 ROLLS/CASE: Brand: 3M™ COBAN™

## (undated) DEVICE — STAPLER SKIN H3.9MM WIRE DIA0.58MM CRWN 6.9MM 35 STPL ROT

## (undated) DEVICE — SHEET,DRAPE,53X77,STERILE: Brand: MEDLINE

## (undated) DEVICE — MAJOR SET UP PK